# Patient Record
Sex: FEMALE | Race: WHITE | NOT HISPANIC OR LATINO | Employment: FULL TIME | URBAN - METROPOLITAN AREA
[De-identification: names, ages, dates, MRNs, and addresses within clinical notes are randomized per-mention and may not be internally consistent; named-entity substitution may affect disease eponyms.]

---

## 2021-08-12 ENCOUNTER — OFFICE VISIT (OUTPATIENT)
Dept: OBSTETRICS AND GYNECOLOGY | Age: 31
End: 2021-08-12

## 2021-08-12 VITALS
HEIGHT: 62 IN | BODY MASS INDEX: 19.69 KG/M2 | SYSTOLIC BLOOD PRESSURE: 100 MMHG | WEIGHT: 107 LBS | DIASTOLIC BLOOD PRESSURE: 58 MMHG

## 2021-08-12 DIAGNOSIS — Z30.011 VISIT FOR ORAL CONTRACEPTIVE PRESCRIPTION: ICD-10-CM

## 2021-08-12 DIAGNOSIS — F17.210 CIGARETTE SMOKER: ICD-10-CM

## 2021-08-12 DIAGNOSIS — Z30.46 NEXPLANON REMOVAL: Primary | ICD-10-CM

## 2021-08-12 LAB
B-HCG UR QL: NEGATIVE
INTERNAL NEGATIVE CONTROL: NORMAL
INTERNAL POSITIVE CONTROL: NORMAL
Lab: NORMAL

## 2021-08-12 PROCEDURE — 81025 URINE PREGNANCY TEST: CPT | Performed by: NURSE PRACTITIONER

## 2021-08-12 PROCEDURE — 99202 OFFICE O/P NEW SF 15 MIN: CPT | Performed by: NURSE PRACTITIONER

## 2021-08-12 PROCEDURE — 11982 REMOVE DRUG IMPLANT DEVICE: CPT | Performed by: NURSE PRACTITIONER

## 2021-08-12 RX ORDER — NORETHINDRONE ACETATE AND ETHINYL ESTRADIOL 1; .02 MG/1; MG/1
1 TABLET ORAL DAILY
Qty: 21 TABLET | Refills: 12 | Status: SHIPPED | OUTPATIENT
Start: 2021-08-12 | End: 2021-11-15

## 2021-08-12 NOTE — PROGRESS NOTES
"Subjective     Chief Complaint   Patient presents with   • Procedure     nexplanon removal       Patsy Motley is a 31 y.o. No obstetric history on file. whose LMP is No LMP recorded.     New GYN  Here for nexplanon removal and discuss starting OCP's  She is passed due on having this removed  She had placed in 2014  During the 3 years she had this she had irregular bleeding at first and senior care through started having regular periods  She is a smoker   Periods - regular, no problems  Pap - 7 years ago, normal and no hx of abnormal  She has 3 children, oldest is a boy and two daughters  She does not want to do her annual today - she will return for this    No Additional Complaints Reported    The following portions of the patient's history were reviewed and updated as appropriate:vital signs, allergies, current medications, past medical history, past social history, past surgical history and problem list      Review of Systems   A comprehensive review of systems was negative.     Objective      /58   Ht 157.5 cm (62\")   Wt 48.5 kg (107 lb)   BMI 19.57 kg/m²     Physical Exam    General:   alert and no distress   Heart: Not performed today   Lungs: Not performed today.   Breast: Not performed today   Neck: na   Abdomen: {Not performed today   CVA: Not performed today   Pelvis: Not performed today   Extremities: Not performed today   Neurologic: negative   Psychiatric: Normal affect, judgement, and mood       Lab Review   Labs: UPT - negative      Imaging   No data reviewed    Assessment/Plan     ASSESSMENT  1. Nexplanon removal    2. Visit for oral contraceptive prescription    3. Cigarette smoker        PLAN  1. No orders of the defined types were placed in this encounter.      2. Medications prescribed this encounter:        New Medications Ordered This Visit   Medications   • norethindrone-ethinyl estradiol (Loestrin 1/20, 21,) 1-20 MG-MCG per tablet     Sig: Take 1 tablet by mouth Daily.     Dispense:  " 21 tablet     Refill:  12       3. Nexplanon removed. Discussed R/B/A, use and side effects of OCP's. She is a smoker and discussed increased risks of blood clots, stroke, MI and that at 33 y/o would need to d/c combo OCP's. She v/u and would like to proceed with starting combo OCP's.     Follow up: 3 month(s) for annual exam and f/u on OCP's.    Marj Dinesh, APRN  2021            Nexplanon Removal Procedure Note    Pre-operative Diagnosis: Nexplanon complication - non    Post-operative Diagnosis: same    Indications:      Procedure Details   The risks (including infection, bruising, irregular bleeding, pain at removal site, and injury to muscles, nerves and blood vessels) and benefits of the procedure were explained to the patient and/or guardian and written informed consent was obtained.      Nexplanon device was easily located by palpation of inner arm.  The device insertion site was painted with betadine and allowed to dry.  Device site anesthetized with 3 ml 2% lidocaine with epi.  End of device was located and 11 blade was used to make small incision.  Device was located in subcutaneou tissue, grasped with hemostat and removed intact. Incision site was closed with steri-strips and band aid.   Pressure dressing applied.  There were no complications.      Pt tolerated procedure well      Condition:  Stable    Complications:  None    Plan:    The patient was advised to call for any rash, arm pain, fever, warmth or for prolonged bruising or bleeding. She was advised to use OTC acetaminophen as needed for mild to moderate pain.   Can remove pressure dressing in 24 hours. Band aid and steri strips in 2-3 days

## 2021-11-15 ENCOUNTER — OFFICE VISIT (OUTPATIENT)
Dept: OBSTETRICS AND GYNECOLOGY | Age: 31
End: 2021-11-15

## 2021-11-15 VITALS
DIASTOLIC BLOOD PRESSURE: 72 MMHG | HEIGHT: 62 IN | BODY MASS INDEX: 19.51 KG/M2 | SYSTOLIC BLOOD PRESSURE: 108 MMHG | WEIGHT: 106 LBS

## 2021-11-15 DIAGNOSIS — Z3A.01 LESS THAN 8 WEEKS GESTATION OF PREGNANCY: Primary | ICD-10-CM

## 2021-11-15 PROBLEM — F17.210 CIGARETTE SMOKER: Status: RESOLVED | Noted: 2021-08-12 | Resolved: 2021-11-15

## 2021-11-15 PROCEDURE — 99213 OFFICE O/P EST LOW 20 MIN: CPT | Performed by: NURSE PRACTITIONER

## 2021-11-15 RX ORDER — PNV NO.95/FERROUS FUM/FOLIC AC 28MG-0.8MG
1 TABLET ORAL DAILY
COMMUNITY

## 2021-11-16 NOTE — PROGRESS NOTES
I have reviewed the notes, assessments, and/or procedures performed by TYRONE THOMAS, I concur with her/his documentation of Patsy Motley.

## 2021-11-24 PROBLEM — F12.90 MARIJUANA USE: Status: ACTIVE | Noted: 2021-11-24

## 2021-11-24 LAB
ABO GROUP BLD: ABNORMAL
AMPHETAMINES UR QL SCN: NEGATIVE NG/ML
BACTERIA UR CULT: NO GROWTH
BACTERIA UR CULT: NORMAL
BARBITURATES UR QL SCN: NEGATIVE NG/ML
BASOPHILS # BLD AUTO: 0 X10E3/UL (ref 0–0.2)
BASOPHILS NFR BLD AUTO: 1 %
BENZODIAZ UR QL: NEGATIVE NG/ML
BLD GP AB SCN SERPL QL: NEGATIVE
BZE UR QL: NEGATIVE NG/ML
C TRACH RRNA SPEC QL NAA+PROBE: NEGATIVE
CANNABINOIDS UR CFM-MCNC: POSITIVE NG/ML
EOSINOPHIL # BLD AUTO: 0.1 X10E3/UL (ref 0–0.4)
EOSINOPHIL NFR BLD AUTO: 1 %
ERYTHROCYTE [DISTWIDTH] IN BLOOD BY AUTOMATED COUNT: 12.1 % (ref 11.7–15.4)
HBV SURFACE AG SERPL QL IA: NEGATIVE
HCT VFR BLD AUTO: 32.9 % (ref 34–46.6)
HCV AB S/CO SERPL IA: <0.1 S/CO RATIO (ref 0–0.9)
HGB A MFR BLD ELPH: 97 % (ref 96.4–98.8)
HGB A2 MFR BLD ELPH: 3 % (ref 1.8–3.2)
HGB BLD-MCNC: 11.1 G/DL (ref 11.1–15.9)
HGB F MFR BLD ELPH: 0 % (ref 0–2)
HGB FRACT BLD-IMP: NORMAL
HGB S MFR BLD ELPH: 0 %
HIV 1+2 AB+HIV1 P24 AG SERPL QL IA: NON REACTIVE
IMM GRANULOCYTES # BLD AUTO: 0 X10E3/UL (ref 0–0.1)
IMM GRANULOCYTES NFR BLD AUTO: 0 %
LYMPHOCYTES # BLD AUTO: 0.9 X10E3/UL (ref 0.7–3.1)
LYMPHOCYTES NFR BLD AUTO: 19 %
MCH RBC QN AUTO: 30.1 PG (ref 26.6–33)
MCHC RBC AUTO-ENTMCNC: 33.7 G/DL (ref 31.5–35.7)
MCV RBC AUTO: 89 FL (ref 79–97)
METHADONE UR QL SCN: NEGATIVE NG/ML
MONOCYTES # BLD AUTO: 0.4 X10E3/UL (ref 0.1–0.9)
MONOCYTES NFR BLD AUTO: 8 %
N GONORRHOEA RRNA SPEC QL NAA+PROBE: NEGATIVE
NEUTROPHILS # BLD AUTO: 3.5 X10E3/UL (ref 1.4–7)
NEUTROPHILS NFR BLD AUTO: 71 %
OPIATES UR QL: NEGATIVE NG/ML
PCP UR QL: NEGATIVE NG/ML
PLATELET # BLD AUTO: 262 X10E3/UL (ref 150–450)
PROPOXYPH UR QL SCN: NEGATIVE NG/ML
RBC # BLD AUTO: 3.69 X10E6/UL (ref 3.77–5.28)
RH BLD: POSITIVE
RPR SER QL: NON REACTIVE
RUBV IGG SERPL IA-ACNC: 8.65 INDEX
T VAGINALIS DNA SPEC QL NAA+PROBE: NEGATIVE
VZV IGG SER IA-ACNC: 971 INDEX
WBC # BLD AUTO: 4.9 X10E3/UL (ref 3.4–10.8)

## 2021-12-17 ENCOUNTER — INITIAL PRENATAL (OUTPATIENT)
Dept: OBSTETRICS AND GYNECOLOGY | Age: 31
End: 2021-12-17

## 2021-12-17 VITALS — SYSTOLIC BLOOD PRESSURE: 108 MMHG | DIASTOLIC BLOOD PRESSURE: 68 MMHG | WEIGHT: 115 LBS | BODY MASS INDEX: 21.03 KG/M2

## 2021-12-17 DIAGNOSIS — Z13.89 SCREENING FOR BLOOD OR PROTEIN IN URINE: ICD-10-CM

## 2021-12-17 DIAGNOSIS — O99.019 MATERNAL ANEMIA IN PREGNANCY, ANTEPARTUM: ICD-10-CM

## 2021-12-17 DIAGNOSIS — Z3A.12 12 WEEKS GESTATION OF PREGNANCY: Primary | ICD-10-CM

## 2021-12-17 LAB
EXTERNAL CYSTIC FIBROSIS: NEGATIVE
EXTERNAL NIPT: NORMAL
GLUCOSE UR STRIP-MCNC: NEGATIVE MG/DL
PROT UR STRIP-MCNC: NEGATIVE MG/DL

## 2021-12-17 PROCEDURE — 99213 OFFICE O/P EST LOW 20 MIN: CPT | Performed by: OBSTETRICS & GYNECOLOGY

## 2021-12-17 PROCEDURE — 90471 IMMUNIZATION ADMIN: CPT | Performed by: OBSTETRICS & GYNECOLOGY

## 2021-12-17 PROCEDURE — 90686 IIV4 VACC NO PRSV 0.5 ML IM: CPT | Performed by: OBSTETRICS & GYNECOLOGY

## 2021-12-17 NOTE — PROGRESS NOTES
CC: OB visit  HPI: pt presents for OB intake visit. She denies any issues today.   ROS:  Const: neg for fever  GI: neg for n/v  : neg for bleeding    O: see physical    A/p: 12 weeks: pt desires aneuploidy and carrier screening. Advised she call if she does not receive results within 2 weeks. Reviewed pnguidelines and call coverage. F/u 4 weeks. Recommend flu shot and pt desires today. Pt declines covid vaccine    Positive marijuana on tox screen: pt notes she stopped smoking since her last visit. Recommend she continue to avoid during pregnancy    Anemia: ordered anemia labs.    Pap done today, advised pt to call if she does not receive results    rtc 4 weeks, u/s in 8 weeks

## 2021-12-18 LAB
BASOPHILS # BLD AUTO: 0 X10E3/UL (ref 0–0.2)
BASOPHILS NFR BLD AUTO: 1 %
EOSINOPHIL # BLD AUTO: 0.1 X10E3/UL (ref 0–0.4)
EOSINOPHIL NFR BLD AUTO: 1 %
ERYTHROCYTE [DISTWIDTH] IN BLOOD BY AUTOMATED COUNT: 12.3 % (ref 11.7–15.4)
FERRITIN SERPL-MCNC: 42 NG/ML (ref 15–150)
FOLATE SERPL-MCNC: 15 NG/ML
HCT VFR BLD AUTO: 32.5 % (ref 34–46.6)
HGB BLD-MCNC: 11 G/DL (ref 11.1–15.9)
IMM GRANULOCYTES # BLD AUTO: 0 X10E3/UL (ref 0–0.1)
IMM GRANULOCYTES NFR BLD AUTO: 0 %
LYMPHOCYTES # BLD AUTO: 1 X10E3/UL (ref 0.7–3.1)
LYMPHOCYTES NFR BLD AUTO: 16 %
MCH RBC QN AUTO: 29.9 PG (ref 26.6–33)
MCHC RBC AUTO-ENTMCNC: 33.8 G/DL (ref 31.5–35.7)
MCV RBC AUTO: 88 FL (ref 79–97)
MONOCYTES # BLD AUTO: 0.4 X10E3/UL (ref 0.1–0.9)
MONOCYTES NFR BLD AUTO: 7 %
NEUTROPHILS # BLD AUTO: 4.5 X10E3/UL (ref 1.4–7)
NEUTROPHILS NFR BLD AUTO: 75 %
PLATELET # BLD AUTO: 247 X10E3/UL (ref 150–450)
RBC # BLD AUTO: 3.68 X10E6/UL (ref 3.77–5.28)
VIT B12 SERPL-MCNC: 449 PG/ML (ref 232–1245)
WBC # BLD AUTO: 6 X10E3/UL (ref 3.4–10.8)

## 2021-12-20 RX ORDER — FERROUS SULFATE 325(65) MG
325 TABLET ORAL
Qty: 30 TABLET | Refills: 11 | Status: SHIPPED | OUTPATIENT
Start: 2021-12-20 | End: 2022-04-05 | Stop reason: SDUPTHER

## 2021-12-22 LAB
CYTOLOGIST CVX/VAG CYTO: NORMAL
CYTOLOGY CVX/VAG DOC CYTO: NORMAL
CYTOLOGY CVX/VAG DOC THIN PREP: NORMAL
DX ICD CODE: NORMAL
HIV 1 & 2 AB SER-IMP: NORMAL
HPV I/H RISK 4 DNA CVX QL PROBE+SIG AMP: NEGATIVE
OTHER STN SPEC: NORMAL
STAT OF ADQ CVX/VAG CYTO-IMP: NORMAL

## 2022-01-25 ENCOUNTER — ROUTINE PRENATAL (OUTPATIENT)
Dept: OBSTETRICS AND GYNECOLOGY | Age: 32
End: 2022-01-25

## 2022-01-25 VITALS — WEIGHT: 121 LBS | BODY MASS INDEX: 22.13 KG/M2 | SYSTOLIC BLOOD PRESSURE: 118 MMHG | DIASTOLIC BLOOD PRESSURE: 58 MMHG

## 2022-01-25 DIAGNOSIS — Z3A.18 18 WEEKS GESTATION OF PREGNANCY: Primary | ICD-10-CM

## 2022-01-25 DIAGNOSIS — Z13.89 SCREENING FOR HEMATURIA OR PROTEINURIA: ICD-10-CM

## 2022-01-25 DIAGNOSIS — D50.9 IRON DEFICIENCY ANEMIA DURING PREGNANCY: ICD-10-CM

## 2022-01-25 DIAGNOSIS — Z36.1 ANTENATAL SCREENING FOR RAISED ALPHAFETOPROTEIN LEVEL: ICD-10-CM

## 2022-01-25 DIAGNOSIS — O99.019 IRON DEFICIENCY ANEMIA DURING PREGNANCY: ICD-10-CM

## 2022-01-25 DIAGNOSIS — Z86.16 HISTORY OF COVID-19: ICD-10-CM

## 2022-01-25 LAB
GLUCOSE UR STRIP-MCNC: NEGATIVE MG/DL
PROT UR STRIP-MCNC: NEGATIVE MG/DL

## 2022-01-25 PROCEDURE — 99213 OFFICE O/P EST LOW 20 MIN: CPT | Performed by: OBSTETRICS & GYNECOLOGY

## 2022-01-25 NOTE — PROGRESS NOTES
CC:OB visit  HPI: pt presents for routine visit. She notes she had covid infection and has recovered without problems. She denies pain or bleeding.   ROS:   : neg for bleeding/pain    O: fh= 18 cm, FHT's 139, ext: no edema or cords    A/p: 18 weeks: afp ordered today, advised to call if she does not receive results within 1 week; u/s at f/u visit 2 weeks    Recent covid infection: discussed option of baby asa as possible increased risk of preeclampsia, plan growth surveillance with u/s    Anemia: pt is taking iron supplement, check cbc/ferritin today

## 2022-01-27 LAB
AFP ADJ MOM SERPL: 1.66
AFP INTERP SERPL-IMP: NORMAL
AFP INTERP SERPL-IMP: NORMAL
AFP SERPL-MCNC: 87.8 NG/ML
AGE AT DELIVERY: 32.9 YR
BASOPHILS # BLD AUTO: 0 X10E3/UL (ref 0–0.2)
BASOPHILS NFR BLD AUTO: 0 %
EOSINOPHIL # BLD AUTO: 0.1 X10E3/UL (ref 0–0.4)
EOSINOPHIL NFR BLD AUTO: 1 %
ERYTHROCYTE [DISTWIDTH] IN BLOOD BY AUTOMATED COUNT: 11.9 % (ref 11.7–15.4)
FERRITIN SERPL-MCNC: 39 NG/ML (ref 15–150)
GA METHOD: NORMAL
GA: 18.3 WEEKS
HCT VFR BLD AUTO: 31.6 % (ref 34–46.6)
HGB BLD-MCNC: 11 G/DL (ref 11.1–15.9)
IDDM PATIENT QL: NO
IMM GRANULOCYTES # BLD AUTO: 0 X10E3/UL (ref 0–0.1)
IMM GRANULOCYTES NFR BLD AUTO: 1 %
LABORATORY COMMENT REPORT: NORMAL
LYMPHOCYTES # BLD AUTO: 0.9 X10E3/UL (ref 0.7–3.1)
LYMPHOCYTES NFR BLD AUTO: 14 %
MCH RBC QN AUTO: 30.6 PG (ref 26.6–33)
MCHC RBC AUTO-ENTMCNC: 34.8 G/DL (ref 31.5–35.7)
MCV RBC AUTO: 88 FL (ref 79–97)
MONOCYTES # BLD AUTO: 0.4 X10E3/UL (ref 0.1–0.9)
MONOCYTES NFR BLD AUTO: 7 %
MULTIPLE PREGNANCY: NO
NEURAL TUBE DEFECT RISK FETUS: 1802 %
NEUTROPHILS # BLD AUTO: 4.6 X10E3/UL (ref 1.4–7)
NEUTROPHILS NFR BLD AUTO: 77 %
PLATELET # BLD AUTO: 232 X10E3/UL (ref 150–450)
RBC # BLD AUTO: 3.59 X10E6/UL (ref 3.77–5.28)
RESULT: NORMAL
WBC # BLD AUTO: 6 X10E3/UL (ref 3.4–10.8)

## 2022-02-09 ENCOUNTER — ROUTINE PRENATAL (OUTPATIENT)
Dept: OBSTETRICS AND GYNECOLOGY | Age: 32
End: 2022-02-09

## 2022-02-09 VITALS — WEIGHT: 124.6 LBS | DIASTOLIC BLOOD PRESSURE: 64 MMHG | BODY MASS INDEX: 22.79 KG/M2 | SYSTOLIC BLOOD PRESSURE: 102 MMHG

## 2022-02-09 DIAGNOSIS — Z3A.20 20 WEEKS GESTATION OF PREGNANCY: Primary | ICD-10-CM

## 2022-02-09 LAB
GLUCOSE UR STRIP-MCNC: NEGATIVE MG/DL
PROT UR STRIP-MCNC: NEGATIVE MG/DL

## 2022-02-09 PROCEDURE — 99213 OFFICE O/P EST LOW 20 MIN: CPT | Performed by: OBSTETRICS & GYNECOLOGY

## 2022-02-09 NOTE — PROGRESS NOTES
CC: OB visit and u/s  HPI: pt presents for routine visit and denies any issues. Notes active fetal movement  ROS:  Const: neg   GI : pos for occ GERD  : neg for pain, bleeding, leaking fluid  O: fh= 21 cm, FHT's  Ext: no edema or cords  U/s: normal anatomic survey, cervical length over 4 cm    A/p: 20 weeks: normal anatomic survey, f/u 4 weeks with one hour glucose    GERD: discussed management     Hx covid during preg: pt has not started baby asa, recommended daily baby asa, plan growth surveillance in 8 weeks    Anemia: pt on iron supplement

## 2022-03-08 ENCOUNTER — ROUTINE PRENATAL (OUTPATIENT)
Dept: OBSTETRICS AND GYNECOLOGY | Age: 32
End: 2022-03-08

## 2022-03-08 VITALS — BODY MASS INDEX: 23.59 KG/M2 | SYSTOLIC BLOOD PRESSURE: 110 MMHG | DIASTOLIC BLOOD PRESSURE: 60 MMHG | WEIGHT: 129 LBS

## 2022-03-08 DIAGNOSIS — Z3A.24 24 WEEKS GESTATION OF PREGNANCY: ICD-10-CM

## 2022-03-08 DIAGNOSIS — O99.019 MATERNAL ANEMIA IN PREGNANCY, ANTEPARTUM: ICD-10-CM

## 2022-03-08 DIAGNOSIS — Z13.1 SCREENING FOR DIABETES MELLITUS: Primary | ICD-10-CM

## 2022-03-08 LAB
GLUCOSE UR STRIP-MCNC: NEGATIVE MG/DL
PROT UR STRIP-MCNC: NEGATIVE MG/DL

## 2022-03-08 PROCEDURE — 99213 OFFICE O/P EST LOW 20 MIN: CPT | Performed by: OBSTETRICS & GYNECOLOGY

## 2022-03-08 RX ORDER — ASPIRIN 81 MG/1
81 TABLET, CHEWABLE ORAL DAILY
COMMUNITY
End: 2022-06-26 | Stop reason: HOSPADM

## 2022-03-08 NOTE — PROGRESS NOTES
CC: OB visit  HPI: pt presents for routine visit. She notes active fetal movement and denies ctx, vag bleeding or leaking fluid. She started baby asa and is taking iron supplement    O: fh= 25 cm  Ext: no edema or cords  FHT;s 140 bpm    A/p: 25 weeks: one hour today, reviewed PTL warnings. F/u 4 weeks    Hx covid during preg: pt started baby asa, plan growth check next visit    Anemia: check ferritin, B12 and folic acid with cbc today

## 2022-03-09 ENCOUNTER — TELEPHONE (OUTPATIENT)
Dept: OBSTETRICS AND GYNECOLOGY | Age: 32
End: 2022-03-09

## 2022-03-09 DIAGNOSIS — R73.09 ELEVATED GLUCOSE TOLERANCE TEST: Primary | ICD-10-CM

## 2022-03-09 LAB
ERYTHROCYTE [DISTWIDTH] IN BLOOD BY AUTOMATED COUNT: 11.9 % (ref 11.7–15.4)
FERRITIN SERPL-MCNC: 31 NG/ML (ref 15–150)
FOLATE SERPL-MCNC: >20 NG/ML
GLUCOSE 1H P 50 G GLC PO SERPL-MCNC: 141 MG/DL (ref 65–139)
HCT VFR BLD AUTO: 29.8 % (ref 34–46.6)
HGB BLD-MCNC: 10.5 G/DL (ref 11.1–15.9)
MCH RBC QN AUTO: 31.1 PG (ref 26.6–33)
MCHC RBC AUTO-ENTMCNC: 35.2 G/DL (ref 31.5–35.7)
MCV RBC AUTO: 88 FL (ref 79–97)
PLATELET # BLD AUTO: 190 X10E3/UL (ref 150–450)
RBC # BLD AUTO: 3.38 X10E6/UL (ref 3.77–5.28)
VIT B12 SERPL-MCNC: 385 PG/ML (ref 232–1245)
WBC # BLD AUTO: 5.2 X10E3/UL (ref 3.4–10.8)

## 2022-03-09 NOTE — TELEPHONE ENCOUNTER
Called Patsy and advised she has an elevated one hour glucose value and needs a 3 hour. This has been ordered and she will call tomorrow to book. Discussed ongoing anemia. She notes she has not been taking iron twice daily. Discussed option of infusion and she would like to try to take twice daily for now. Also recommended she start B 12 supplement as low range of normal.

## 2022-03-15 ENCOUNTER — LAB (OUTPATIENT)
Dept: OBSTETRICS AND GYNECOLOGY | Age: 32
End: 2022-03-15

## 2022-03-16 LAB
GLUCOSE 1H P 100 G GLC PO SERPL-MCNC: 177 MG/DL (ref 65–179)
GLUCOSE 2H P 100 G GLC PO SERPL-MCNC: 164 MG/DL (ref 65–154)
GLUCOSE 3H P 100 G GLC PO SERPL-MCNC: 118 MG/DL (ref 65–139)
GLUCOSE P FAST SERPL-MCNC: 74 MG/DL (ref 65–94)
Lab: ABNORMAL

## 2022-04-05 ENCOUNTER — ROUTINE PRENATAL (OUTPATIENT)
Dept: OBSTETRICS AND GYNECOLOGY | Age: 32
End: 2022-04-05

## 2022-04-05 VITALS — DIASTOLIC BLOOD PRESSURE: 62 MMHG | SYSTOLIC BLOOD PRESSURE: 112 MMHG | WEIGHT: 133 LBS | BODY MASS INDEX: 24.33 KG/M2

## 2022-04-05 DIAGNOSIS — F12.90 MARIJUANA USE: ICD-10-CM

## 2022-04-05 DIAGNOSIS — O99.019 MATERNAL ANEMIA IN PREGNANCY, ANTEPARTUM: ICD-10-CM

## 2022-04-05 DIAGNOSIS — Z3A.28 28 WEEKS GESTATION OF PREGNANCY: Primary | ICD-10-CM

## 2022-04-05 LAB
GLUCOSE UR STRIP-MCNC: NEGATIVE MG/DL
PROT UR STRIP-MCNC: NEGATIVE MG/DL

## 2022-04-05 PROCEDURE — 90715 TDAP VACCINE 7 YRS/> IM: CPT | Performed by: OBSTETRICS & GYNECOLOGY

## 2022-04-05 PROCEDURE — 76816 OB US FOLLOW-UP PER FETUS: CPT | Performed by: OBSTETRICS & GYNECOLOGY

## 2022-04-05 PROCEDURE — 90471 IMMUNIZATION ADMIN: CPT | Performed by: OBSTETRICS & GYNECOLOGY

## 2022-04-05 PROCEDURE — 99213 OFFICE O/P EST LOW 20 MIN: CPT | Performed by: OBSTETRICS & GYNECOLOGY

## 2022-04-05 RX ORDER — FERROUS SULFATE 325(65) MG
325 TABLET ORAL 2 TIMES DAILY
Qty: 60 TABLET | Refills: 6 | Status: SHIPPED | OUTPATIENT
Start: 2022-04-05

## 2022-04-05 NOTE — PROGRESS NOTES
CC: ob visit  HPI: pt presents for routine visit. She denies any issues today. She notes active fetal movement and denies reg ctx, vag bleeding/leaking fluid    ROS:  Const: neg   : neg for regular ctx, vag bleeding/leaking fluid  O: fh= 29 cm, FHT;s 151 bpm, ext: no cords or edema    U/s: growth at 57 %, AC 66 %, ana laura 20, vtx    A/p: hx covid during preg: pt taking baby asa, growth appropriate, repeat 4 weeks    Elevated one hour: pt passed 3 hour, recommend she limit concentrated carbohydrates/sugars    Anemia: pt taking iron twice daily, repeat cbc/ferritin today    28 weeks: recommend daily fmc's and tdap today. Pt agrees. F/u 2 weeks.

## 2022-04-06 LAB
BASOPHILS # BLD AUTO: 0 X10E3/UL (ref 0–0.2)
BASOPHILS NFR BLD AUTO: 0 %
EOSINOPHIL # BLD AUTO: 0.1 X10E3/UL (ref 0–0.4)
EOSINOPHIL NFR BLD AUTO: 1 %
ERYTHROCYTE [DISTWIDTH] IN BLOOD BY AUTOMATED COUNT: 12.2 % (ref 11.7–15.4)
FERRITIN SERPL-MCNC: 31 NG/ML (ref 15–150)
HCT VFR BLD AUTO: 33.2 % (ref 34–46.6)
HGB BLD-MCNC: 11.4 G/DL (ref 11.1–15.9)
IMM GRANULOCYTES # BLD AUTO: 0 X10E3/UL (ref 0–0.1)
IMM GRANULOCYTES NFR BLD AUTO: 1 %
LYMPHOCYTES # BLD AUTO: 0.9 X10E3/UL (ref 0.7–3.1)
LYMPHOCYTES NFR BLD AUTO: 16 %
MCH RBC QN AUTO: 30.6 PG (ref 26.6–33)
MCHC RBC AUTO-ENTMCNC: 34.3 G/DL (ref 31.5–35.7)
MCV RBC AUTO: 89 FL (ref 79–97)
MONOCYTES # BLD AUTO: 0.5 X10E3/UL (ref 0.1–0.9)
MONOCYTES NFR BLD AUTO: 9 %
NEUTROPHILS # BLD AUTO: 4.2 X10E3/UL (ref 1.4–7)
NEUTROPHILS NFR BLD AUTO: 73 %
PLATELET # BLD AUTO: 198 X10E3/UL (ref 150–450)
RBC # BLD AUTO: 3.73 X10E6/UL (ref 3.77–5.28)
WBC # BLD AUTO: 5.7 X10E3/UL (ref 3.4–10.8)

## 2022-04-07 NOTE — PROGRESS NOTES
Pt notified of results, Pt noted she would like to know if taking a Grassfed Beef Liver supplement would help improve her Iron? She heard from a friend that this may help.

## 2022-04-19 ENCOUNTER — ROUTINE PRENATAL (OUTPATIENT)
Dept: OBSTETRICS AND GYNECOLOGY | Age: 32
End: 2022-04-19

## 2022-04-19 VITALS — BODY MASS INDEX: 24.91 KG/M2 | DIASTOLIC BLOOD PRESSURE: 52 MMHG | SYSTOLIC BLOOD PRESSURE: 112 MMHG | WEIGHT: 136.2 LBS

## 2022-04-19 DIAGNOSIS — Z3A.30 30 WEEKS GESTATION OF PREGNANCY: Primary | ICD-10-CM

## 2022-04-19 LAB
GLUCOSE UR STRIP-MCNC: NEGATIVE MG/DL
PROT UR STRIP-MCNC: NEGATIVE MG/DL

## 2022-04-19 PROCEDURE — 99213 OFFICE O/P EST LOW 20 MIN: CPT | Performed by: OBSTETRICS & GYNECOLOGY

## 2022-04-19 NOTE — PROGRESS NOTES
CC: OB visit  HPI: pt presents for routine visit. She denies any issues today. She notes active fetal movement and denies ctx, bleeding, leaking fuid  Ros:  Const: neg for fever  : neg for ctx, vag bleeding/leaking fluid  O: fh= 30 cm, FHT's 132 bpm, ext: no edema or cords    A/p: 30 weeks: continue daily fmc's and reviewed PTL warnings    Anemia: improved last check. She will continue iron supplement    covid during preg: she is taking baby asa, plan growth u/s 2 weeks at next appt

## 2022-05-04 ENCOUNTER — ROUTINE PRENATAL (OUTPATIENT)
Dept: OBSTETRICS AND GYNECOLOGY | Age: 32
End: 2022-05-04

## 2022-05-04 VITALS — SYSTOLIC BLOOD PRESSURE: 112 MMHG | BODY MASS INDEX: 25.13 KG/M2 | DIASTOLIC BLOOD PRESSURE: 62 MMHG | WEIGHT: 137.4 LBS

## 2022-05-04 DIAGNOSIS — O99.019 MATERNAL ANEMIA IN PREGNANCY, ANTEPARTUM: ICD-10-CM

## 2022-05-04 DIAGNOSIS — R73.09 ELEVATED GLUCOSE TOLERANCE TEST: ICD-10-CM

## 2022-05-04 DIAGNOSIS — Z3A.32 32 WEEKS GESTATION OF PREGNANCY: Primary | ICD-10-CM

## 2022-05-04 LAB
GLUCOSE UR STRIP-MCNC: NEGATIVE MG/DL
PROT UR STRIP-MCNC: NEGATIVE MG/DL

## 2022-05-04 PROCEDURE — 99213 OFFICE O/P EST LOW 20 MIN: CPT | Performed by: OBSTETRICS & GYNECOLOGY

## 2022-05-04 RX ORDER — BLOOD-GLUCOSE METER
1 KIT MISCELLANEOUS AS NEEDED
Qty: 1 EACH | Refills: 0 | Status: SHIPPED | OUTPATIENT
Start: 2022-05-04

## 2022-05-04 NOTE — PROGRESS NOTES
CC: OB visti  HPI: pt presents for routine visit. No issues today. She notes active fetal movement. She denies reg ctx, vag bleeding, leaking fluid  Ros:  : neg for reg ctx, bleeding/leaking fluid  O: fh= 33 cm, FHT's 149 bpm, ext : no edema or cords    U/s: growth 90 %, ana laura 12.6    A/p: LGA: pt had elevated one hour but passed 3 hour with 1 abnormal value. Recommend she start glucose monitoring and supplies sent with instructions. Also referred for diabetic and diet counseling and pt agrees. Continue daily fmc's.     covid during preg: on baby asa, continue growth surveillance    Anemia: on iron supplement, f/u labs today

## 2022-05-05 LAB
BASOPHILS # BLD AUTO: 0 X10E3/UL (ref 0–0.2)
BASOPHILS NFR BLD AUTO: 0 %
EOSINOPHIL # BLD AUTO: 0.1 X10E3/UL (ref 0–0.4)
EOSINOPHIL NFR BLD AUTO: 1 %
ERYTHROCYTE [DISTWIDTH] IN BLOOD BY AUTOMATED COUNT: 12.5 % (ref 11.7–15.4)
FERRITIN SERPL-MCNC: 40 NG/ML (ref 15–150)
HCT VFR BLD AUTO: 33.2 % (ref 34–46.6)
HGB BLD-MCNC: 11.2 G/DL (ref 11.1–15.9)
IMM GRANULOCYTES # BLD AUTO: 0.1 X10E3/UL (ref 0–0.1)
IMM GRANULOCYTES NFR BLD AUTO: 1 %
LYMPHOCYTES # BLD AUTO: 0.9 X10E3/UL (ref 0.7–3.1)
LYMPHOCYTES NFR BLD AUTO: 15 %
MCH RBC QN AUTO: 30.1 PG (ref 26.6–33)
MCHC RBC AUTO-ENTMCNC: 33.7 G/DL (ref 31.5–35.7)
MCV RBC AUTO: 89 FL (ref 79–97)
MONOCYTES # BLD AUTO: 0.5 X10E3/UL (ref 0.1–0.9)
MONOCYTES NFR BLD AUTO: 9 %
NEUTROPHILS # BLD AUTO: 4.3 X10E3/UL (ref 1.4–7)
NEUTROPHILS NFR BLD AUTO: 74 %
PLATELET # BLD AUTO: 188 X10E3/UL (ref 150–450)
RBC # BLD AUTO: 3.72 X10E6/UL (ref 3.77–5.28)
WBC # BLD AUTO: 5.8 X10E3/UL (ref 3.4–10.8)

## 2022-05-06 ENCOUNTER — HOSPITAL ENCOUNTER (OUTPATIENT)
Dept: DIABETES SERVICES | Facility: HOSPITAL | Age: 32
Discharge: HOME | End: 2022-05-06
Admitting: OBSTETRICS & GYNECOLOGY

## 2022-05-06 PROCEDURE — G0108 DIAB MANAGE TRN  PER INDIV: HCPCS

## 2022-05-09 ENCOUNTER — ROUTINE PRENATAL (OUTPATIENT)
Dept: OBSTETRICS AND GYNECOLOGY | Age: 32
End: 2022-05-09

## 2022-05-09 VITALS — DIASTOLIC BLOOD PRESSURE: 54 MMHG | BODY MASS INDEX: 25.2 KG/M2 | WEIGHT: 137.8 LBS | SYSTOLIC BLOOD PRESSURE: 110 MMHG

## 2022-05-09 DIAGNOSIS — R73.09 ELEVATED GLUCOSE TOLERANCE TEST: ICD-10-CM

## 2022-05-09 DIAGNOSIS — Z3A.33 33 WEEKS GESTATION OF PREGNANCY: Primary | ICD-10-CM

## 2022-05-09 LAB
GLUCOSE UR STRIP-MCNC: NEGATIVE MG/DL
PROT UR STRIP-MCNC: NEGATIVE MG/DL

## 2022-05-09 PROCEDURE — 99213 OFFICE O/P EST LOW 20 MIN: CPT | Performed by: OBSTETRICS & GYNECOLOGY

## 2022-05-09 RX ORDER — BLOOD-GLUCOSE METER
KIT MISCELLANEOUS 4 TIMES DAILY
COMMUNITY
Start: 2022-05-04

## 2022-05-09 NOTE — PROGRESS NOTES
CC: ob visit  HPI: pt presents for routine visit. She started glucose monitoring and had some elevated values after dinner that is improving with diet modification.   O: glucose log: all fasting normal and under 85. ( 1 elevated reported but was not fasting ); pp values normal except after dinner. Initially elevated but improved with last 3 values    A/p: glucose resistance/LGA: continue glucose monitoring and attention to diet. Pt verbally consulted with dietician and notes this helped. She will continue testing. Plan repeat growth in 3 weeks then weekly BPP    Anemia: stable with iron supplementation    Hx covid: pt on baby asa

## 2022-05-17 ENCOUNTER — ROUTINE PRENATAL (OUTPATIENT)
Dept: OBSTETRICS AND GYNECOLOGY | Age: 32
End: 2022-05-17

## 2022-05-17 ENCOUNTER — HOSPITAL ENCOUNTER (OUTPATIENT)
Dept: CARDIOLOGY | Facility: HOSPITAL | Age: 32
Discharge: HOME | End: 2022-05-17
Admitting: OBSTETRICS & GYNECOLOGY

## 2022-05-17 VITALS — BODY MASS INDEX: 24.98 KG/M2 | DIASTOLIC BLOOD PRESSURE: 58 MMHG | SYSTOLIC BLOOD PRESSURE: 110 MMHG | WEIGHT: 136.6 LBS

## 2022-05-17 DIAGNOSIS — M79.662 PAIN OF LEFT CALF: ICD-10-CM

## 2022-05-17 DIAGNOSIS — Z3A.34 34 WEEKS GESTATION OF PREGNANCY: Primary | ICD-10-CM

## 2022-05-17 LAB
BH CV LOWER VASCULAR LEFT COMMON FEMORAL AUGMENT: NORMAL
BH CV LOWER VASCULAR LEFT COMMON FEMORAL COMPETENT: NORMAL
BH CV LOWER VASCULAR LEFT COMMON FEMORAL COMPRESS: NORMAL
BH CV LOWER VASCULAR LEFT COMMON FEMORAL PHASIC: NORMAL
BH CV LOWER VASCULAR LEFT COMMON FEMORAL SPONT: NORMAL
BH CV LOWER VASCULAR LEFT DISTAL FEMORAL COMPRESS: NORMAL
BH CV LOWER VASCULAR LEFT GASTRONEMIUS COMPRESS: NORMAL
BH CV LOWER VASCULAR LEFT GREATER SAPH AK COMPRESS: NORMAL
BH CV LOWER VASCULAR LEFT GREATER SAPH BK COMPRESS: NORMAL
BH CV LOWER VASCULAR LEFT LESSER SAPH COMPRESS: NORMAL
BH CV LOWER VASCULAR LEFT MID FEMORAL AUGMENT: NORMAL
BH CV LOWER VASCULAR LEFT MID FEMORAL COMPETENT: NORMAL
BH CV LOWER VASCULAR LEFT MID FEMORAL COMPRESS: NORMAL
BH CV LOWER VASCULAR LEFT MID FEMORAL PHASIC: NORMAL
BH CV LOWER VASCULAR LEFT MID FEMORAL SPONT: NORMAL
BH CV LOWER VASCULAR LEFT PERONEAL COMPRESS: NORMAL
BH CV LOWER VASCULAR LEFT POPLITEAL AUGMENT: NORMAL
BH CV LOWER VASCULAR LEFT POPLITEAL COMPETENT: NORMAL
BH CV LOWER VASCULAR LEFT POPLITEAL COMPRESS: NORMAL
BH CV LOWER VASCULAR LEFT POPLITEAL PHASIC: NORMAL
BH CV LOWER VASCULAR LEFT POPLITEAL SPONT: NORMAL
BH CV LOWER VASCULAR LEFT POSTERIOR TIBIAL COMPRESS: NORMAL
BH CV LOWER VASCULAR LEFT PROFUNDA FEMORAL COMPRESS: NORMAL
BH CV LOWER VASCULAR LEFT PROXIMAL FEMORAL COMPRESS: NORMAL
BH CV LOWER VASCULAR LEFT SAPHENOFEMORAL JUNCTION COMPRESS: NORMAL
BH CV LOWER VASCULAR RIGHT COMMON FEMORAL AUGMENT: NORMAL
BH CV LOWER VASCULAR RIGHT COMMON FEMORAL COMPETENT: NORMAL
BH CV LOWER VASCULAR RIGHT COMMON FEMORAL COMPRESS: NORMAL
BH CV LOWER VASCULAR RIGHT COMMON FEMORAL PHASIC: NORMAL
BH CV LOWER VASCULAR RIGHT COMMON FEMORAL SPONT: NORMAL
BH CV LOWER VASCULAR RIGHT DISTAL FEMORAL COMPRESS: NORMAL
BH CV LOWER VASCULAR RIGHT GASTRONEMIUS COMPRESS: NORMAL
BH CV LOWER VASCULAR RIGHT GREATER SAPH AK COMPRESS: NORMAL
BH CV LOWER VASCULAR RIGHT GREATER SAPH BK COMPRESS: NORMAL
BH CV LOWER VASCULAR RIGHT LESSER SAPH COMPRESS: NORMAL
BH CV LOWER VASCULAR RIGHT MID FEMORAL AUGMENT: NORMAL
BH CV LOWER VASCULAR RIGHT MID FEMORAL COMPETENT: NORMAL
BH CV LOWER VASCULAR RIGHT MID FEMORAL COMPRESS: NORMAL
BH CV LOWER VASCULAR RIGHT MID FEMORAL PHASIC: NORMAL
BH CV LOWER VASCULAR RIGHT MID FEMORAL SPONT: NORMAL
BH CV LOWER VASCULAR RIGHT PERONEAL COMPRESS: NORMAL
BH CV LOWER VASCULAR RIGHT POPLITEAL AUGMENT: NORMAL
BH CV LOWER VASCULAR RIGHT POPLITEAL COMPETENT: NORMAL
BH CV LOWER VASCULAR RIGHT POPLITEAL COMPRESS: NORMAL
BH CV LOWER VASCULAR RIGHT POPLITEAL PHASIC: NORMAL
BH CV LOWER VASCULAR RIGHT POPLITEAL SPONT: NORMAL
BH CV LOWER VASCULAR RIGHT POSTERIOR TIBIAL COMPRESS: NORMAL
BH CV LOWER VASCULAR RIGHT PROFUNDA FEMORAL COMPRESS: NORMAL
BH CV LOWER VASCULAR RIGHT PROXIMAL FEMORAL COMPRESS: NORMAL
BH CV LOWER VASCULAR RIGHT SAPHENOFEMORAL JUNCTION COMPRESS: NORMAL
GLUCOSE UR STRIP-MCNC: NEGATIVE MG/DL
MAXIMAL PREDICTED HEART RATE: 188 BPM
PROT UR STRIP-MCNC: NEGATIVE MG/DL
STRESS TARGET HR: 160 BPM

## 2022-05-17 PROCEDURE — 99213 OFFICE O/P EST LOW 20 MIN: CPT | Performed by: OBSTETRICS & GYNECOLOGY

## 2022-05-17 PROCEDURE — 93970 EXTREMITY STUDY: CPT

## 2022-05-17 NOTE — PROGRESS NOTES
CC: ob visit  HPI: pt presents for routine visit. She denies any issues today. She does complain of left calf pain last week. She had a yari horse but then residual pain in her left calf for several days. Pain is now gone.     ROS:   Gu: neg for bleeding/leaking fluid    O: Glucose log: fasting all less than 91; pp values overall normal, 4 values in last week mildly increased in 130's    Fh=35 cm, FHTs 152 bpm, ext: bilateral lower ext without cords, edema or tenderness    A/p: LGA: glucose log reviewed and overall normal. Continue daily fmc's and repeat u/s scheduled in 2 weeks. Pt will continue glucose monitoring    Calf pain : now resolved but present for a few days. Recommend doppler and pt agrees. Ordered for today. If neg, plan observation, pt advised to call if symptoms recur

## 2022-05-17 NOTE — PROGRESS NOTES
Bilateral leg venous Doppler study preliminary report: negative for dvt in both legs.  Called report to Dr. Logan and no further instructions were given.

## 2022-05-31 ENCOUNTER — ROUTINE PRENATAL (OUTPATIENT)
Dept: OBSTETRICS AND GYNECOLOGY | Age: 32
End: 2022-05-31

## 2022-05-31 VITALS — BODY MASS INDEX: 24.84 KG/M2 | SYSTOLIC BLOOD PRESSURE: 110 MMHG | DIASTOLIC BLOOD PRESSURE: 62 MMHG | WEIGHT: 135.8 LBS

## 2022-05-31 DIAGNOSIS — Z36.85 ANTENATAL SCREENING FOR STREPTOCOCCUS B: ICD-10-CM

## 2022-05-31 DIAGNOSIS — Z3A.36 36 WEEKS GESTATION OF PREGNANCY: Primary | ICD-10-CM

## 2022-05-31 LAB
GLUCOSE UR STRIP-MCNC: NEGATIVE MG/DL
PROT UR STRIP-MCNC: NEGATIVE MG/DL

## 2022-05-31 PROCEDURE — 99213 OFFICE O/P EST LOW 20 MIN: CPT | Performed by: OBSTETRICS & GYNECOLOGY

## 2022-05-31 NOTE — PROGRESS NOTES
CC: OB visit and u/s  HPI: pt presents for routine visit and u/s. She notes active fetal movement and denies any issues.   Glucose log reviewed: fasting values all < or = 95, pp values with sporadic elevations but most values are normal    Ros:  : neg for bleeding/leaking fluid or reg ctx  U/s: growth at 74 %, ac at 55%, efw = 6-14, ana laura normal at 16    A/p: glucose intolerance/hx LGA: u/s with appropriate growth today. Glucose log reviewed. Occasional elevations noted without trend. Most values are normal. Continue glucose monitoring. Continue daily fmc's.     Anemia: pt is on iron supplement.     36 weeks: GBS done, reviewed labor warnings.

## 2022-06-02 LAB — GP B STREP DNA SPEC QL NAA+PROBE: NEGATIVE

## 2022-06-07 ENCOUNTER — ROUTINE PRENATAL (OUTPATIENT)
Dept: OBSTETRICS AND GYNECOLOGY | Age: 32
End: 2022-06-07

## 2022-06-07 VITALS — DIASTOLIC BLOOD PRESSURE: 58 MMHG | BODY MASS INDEX: 25.42 KG/M2 | WEIGHT: 139 LBS | SYSTOLIC BLOOD PRESSURE: 110 MMHG

## 2022-06-07 DIAGNOSIS — Z86.16 HISTORY OF COVID-19: ICD-10-CM

## 2022-06-07 DIAGNOSIS — Z3A.37 37 WEEKS GESTATION OF PREGNANCY: Primary | ICD-10-CM

## 2022-06-07 LAB
GLUCOSE UR STRIP-MCNC: NEGATIVE MG/DL
PROT UR STRIP-MCNC: NEGATIVE MG/DL

## 2022-06-07 PROCEDURE — 76819 FETAL BIOPHYS PROFIL W/O NST: CPT | Performed by: OBSTETRICS & GYNECOLOGY

## 2022-06-07 PROCEDURE — 99213 OFFICE O/P EST LOW 20 MIN: CPT | Performed by: OBSTETRICS & GYNECOLOGY

## 2022-06-07 NOTE — PROGRESS NOTES
CC: ob visit  HPI: pt presents for routine visit. No issues today. Notes active fetal movement.    Ros:  Cv: pos for mild swelling  Pulm: neg for soa  Neuro: neg for headaches, vision changes  : neg for reg ctx, vag bleeding/leaking fluid  O: glucose log: fasting all less than 95, pp values 106 to 132  Cervix: cl/50/-3, ext: trace edema, no cords    U/s: BPP 8/8, ana laura 16    A/p: 37 weeks: reviewed labor warnings, continue daily fmc's    Hx covid: mild swelling noted, continue weekly BPP, reviewed preeclamptic warnings, recommend she stop baby asa    Glucose intolerance: last efw 74 %, glucose values stable with monitoring. Reviewed risks of shoulder dystocia increased with glucose intolerance but not contraindication to vag delivery with recent efw. Discussed that shoulder dystocia can result in permanent injury. Discussed c/s is option to prevent. Pt considered and prefers vaginal delivery. She had uncomplicated deliveries in past.

## 2022-06-14 ENCOUNTER — ROUTINE PRENATAL (OUTPATIENT)
Dept: OBSTETRICS AND GYNECOLOGY | Age: 32
End: 2022-06-14

## 2022-06-14 VITALS — BODY MASS INDEX: 25.28 KG/M2 | DIASTOLIC BLOOD PRESSURE: 54 MMHG | SYSTOLIC BLOOD PRESSURE: 110 MMHG | WEIGHT: 138.2 LBS

## 2022-06-14 DIAGNOSIS — Z34.90 TERM PREGNANCY: ICD-10-CM

## 2022-06-14 DIAGNOSIS — Z3A.38 38 WEEKS GESTATION OF PREGNANCY: Primary | ICD-10-CM

## 2022-06-14 LAB
GLUCOSE UR STRIP-MCNC: NEGATIVE MG/DL
PROT UR STRIP-MCNC: NEGATIVE MG/DL

## 2022-06-14 PROCEDURE — 99213 OFFICE O/P EST LOW 20 MIN: CPT | Performed by: OBSTETRICS & GYNECOLOGY

## 2022-06-14 NOTE — PROGRESS NOTES
CC: OB visit  HPI: pt presents for routine visit. She denies leaking fluid or vag bleeeding. She notes active fetal movement.   Glucose log reviewed: all fasting values are under 94; pp values have occ elevations but most normal    ROS:  : neg for leaking fluid or vag bleeding    U/S: BPP 8/8, ana laura 7.98 to 8.48    A/p: hx covid: growth normal at 36 weeks, reassuring BPP, ana laura is lower range of normal. Plan repeat BPP in 2 days and pt agrees.     Glucose intolerance: growth was appropriate last check at 36 weeks. Glucose monitoring with stable values overall.     38 weeks: reviewed options of IOL at 39 weeks. Risks and benefits reviewed and pt desires. Will schedule next Monday and pt agrees.  Reviewed labor warnings. Continue daily fmc's and labor warnings.

## 2022-06-16 ENCOUNTER — ROUTINE PRENATAL (OUTPATIENT)
Dept: OBSTETRICS AND GYNECOLOGY | Age: 32
End: 2022-06-16

## 2022-06-16 VITALS — WEIGHT: 137.4 LBS | DIASTOLIC BLOOD PRESSURE: 62 MMHG | BODY MASS INDEX: 25.13 KG/M2 | SYSTOLIC BLOOD PRESSURE: 120 MMHG

## 2022-06-16 DIAGNOSIS — Z3A.38 38 WEEKS GESTATION OF PREGNANCY: Primary | ICD-10-CM

## 2022-06-16 LAB
GLUCOSE UR STRIP-MCNC: NEGATIVE MG/DL
PROT UR STRIP-MCNC: NEGATIVE MG/DL

## 2022-06-16 PROCEDURE — 99213 OFFICE O/P EST LOW 20 MIN: CPT | Performed by: OBSTETRICS & GYNECOLOGY

## 2022-06-16 NOTE — PROGRESS NOTES
CC: f/u u/s, borderline low tanner  HPI:pt presents for f/u u/s and denies any issues. She had some mucous yesterday but denies leaking fluid. She notes active fetal movement.     ROS:  : pos for mucous plug, neg for bleeding/leaking fluid.    O: u/s: BPP= 8/8, tanner = 11    A/p: appropriate TANNER noted today, plan observe for labor or IOL next week as booked. Pt understands risks and benefits and agrees with plan. Instructions reviewed. Continue daily fmc's.       A/p: hx covid: normal TANNER today with reassuring BPP, plan delivery next week at 39 weeks. L&D not booking additional inductions on 6/20 so pt booked 6/21.

## 2022-06-20 ENCOUNTER — PREP FOR SURGERY (OUTPATIENT)
Dept: OTHER | Facility: HOSPITAL | Age: 32
End: 2022-06-20

## 2022-06-20 DIAGNOSIS — Z3A.39 39 WEEKS GESTATION OF PREGNANCY: Primary | ICD-10-CM

## 2022-06-20 RX ORDER — OXYTOCIN-SODIUM CHLORIDE 0.9% IV SOLN 30 UNIT/500ML 30-0.9/5 UT/ML-%
250 SOLUTION INTRAVENOUS CONTINUOUS PRN
Status: CANCELLED | OUTPATIENT
Start: 2022-06-20 | End: 2022-06-20

## 2022-06-20 RX ORDER — MISOPROSTOL 200 UG/1
800 TABLET ORAL ONCE AS NEEDED
Status: CANCELLED | OUTPATIENT
Start: 2022-06-20

## 2022-06-20 RX ORDER — MAGNESIUM CARB/ALUMINUM HYDROX 105-160MG
30 TABLET,CHEWABLE ORAL ONCE
Status: CANCELLED | OUTPATIENT
Start: 2022-06-20 | End: 2022-06-20

## 2022-06-20 RX ORDER — CARBOPROST TROMETHAMINE 250 UG/ML
250 INJECTION, SOLUTION INTRAMUSCULAR
Status: CANCELLED | OUTPATIENT
Start: 2022-06-20

## 2022-06-20 RX ORDER — OXYTOCIN-SODIUM CHLORIDE 0.9% IV SOLN 30 UNIT/500ML 30-0.9/5 UT/ML-%
2-20 SOLUTION INTRAVENOUS
Status: CANCELLED | OUTPATIENT
Start: 2022-06-20

## 2022-06-20 RX ORDER — METHYLERGONOVINE MALEATE 0.2 MG/ML
200 INJECTION INTRAVENOUS ONCE AS NEEDED
Status: CANCELLED | OUTPATIENT
Start: 2022-06-20

## 2022-06-20 RX ORDER — SODIUM CHLORIDE 0.9 % (FLUSH) 0.9 %
10 SYRINGE (ML) INJECTION AS NEEDED
Status: CANCELLED | OUTPATIENT
Start: 2022-06-20

## 2022-06-20 RX ORDER — SODIUM CHLORIDE 0.9 % (FLUSH) 0.9 %
10 SYRINGE (ML) INJECTION EVERY 12 HOURS SCHEDULED
Status: CANCELLED | OUTPATIENT
Start: 2022-06-20

## 2022-06-20 RX ORDER — OXYTOCIN-SODIUM CHLORIDE 0.9% IV SOLN 30 UNIT/500ML 30-0.9/5 UT/ML-%
999 SOLUTION INTRAVENOUS ONCE
Status: CANCELLED | OUTPATIENT
Start: 2022-06-20

## 2022-06-21 ENCOUNTER — TELEPHONE (OUTPATIENT)
Dept: OBSTETRICS AND GYNECOLOGY | Age: 32
End: 2022-06-21

## 2022-06-21 NOTE — TELEPHONE ENCOUNTER
Called pt to discuss. L&D was not able to bring her in today for IOL. Her partner has to work this PM so they prefer to hold on IOL at this time. He will be off again this Friday so they have requested that day. She will come in tomorrow for visit and BPP. She notes active fetal movement and denies any issues. She agrees to present to L& D with labor symptoms.

## 2022-06-21 NOTE — TELEPHONE ENCOUNTER
Pt called to let us know that he induction was pushed back until 5p today. But she states she cannot make that and needs to reschedule.

## 2022-06-22 ENCOUNTER — ROUTINE PRENATAL (OUTPATIENT)
Dept: OBSTETRICS AND GYNECOLOGY | Age: 32
End: 2022-06-22

## 2022-06-22 VITALS — DIASTOLIC BLOOD PRESSURE: 70 MMHG | SYSTOLIC BLOOD PRESSURE: 120 MMHG | WEIGHT: 136.2 LBS | BODY MASS INDEX: 24.91 KG/M2

## 2022-06-22 DIAGNOSIS — O99.019 MATERNAL ANEMIA IN PREGNANCY, ANTEPARTUM: ICD-10-CM

## 2022-06-22 DIAGNOSIS — Z3A.39 39 WEEKS GESTATION OF PREGNANCY: Primary | ICD-10-CM

## 2022-06-22 DIAGNOSIS — R73.09 ELEVATED GLUCOSE TOLERANCE TEST: ICD-10-CM

## 2022-06-22 DIAGNOSIS — Z86.16 HISTORY OF COVID-19: ICD-10-CM

## 2022-06-22 LAB
GLUCOSE UR STRIP-MCNC: NEGATIVE MG/DL
PROT UR STRIP-MCNC: NEGATIVE MG/DL

## 2022-06-22 PROCEDURE — 99213 OFFICE O/P EST LOW 20 MIN: CPT | Performed by: OBSTETRICS & GYNECOLOGY

## 2022-06-22 NOTE — PROGRESS NOTES
CC: OB visit  HPI: pt presents for routine visit. She was scheduled for IOL for L&D was not able to get her in yesterday AM. Due to spouse's work schedule, pt prefers to hold on IOL until Friday. She notes active fetal movement and denies bleeding/leaking fluid.   She notes her glucose values remain in desired range    ROS:  : neg for reg ctx, vag bleeding or leaking fluid  O: BPP 8/8, ana laura 14.9    A/p: 39 weeks: proceed with IOL later this week per pt request. Reviewed labor warnings and advised daily fmc's    Anemia: pt will continue iron supplement    Elevated one hour: growth appropriate last check at 74 %, pt monitored glucose values and predominantly normal    Hx covid: BPP is reassuring today, continue daily fmc's until delivery

## 2022-06-24 ENCOUNTER — HOSPITAL ENCOUNTER (INPATIENT)
Facility: HOSPITAL | Age: 32
LOS: 2 days | Discharge: HOME | End: 2022-06-26
Attending: OBSTETRICS & GYNECOLOGY | Admitting: OBSTETRICS & GYNECOLOGY

## 2022-06-24 ENCOUNTER — ANESTHESIA (OUTPATIENT)
Dept: LABOR AND DELIVERY | Facility: HOSPITAL | Age: 32
End: 2022-06-24

## 2022-06-24 ENCOUNTER — HOSPITAL ENCOUNTER (INPATIENT)
Dept: LABOR AND DELIVERY | Facility: HOSPITAL | Age: 32
Discharge: HOME | End: 2022-06-24

## 2022-06-24 ENCOUNTER — ANESTHESIA EVENT (OUTPATIENT)
Dept: LABOR AND DELIVERY | Facility: HOSPITAL | Age: 32
End: 2022-06-24

## 2022-06-24 DIAGNOSIS — Z3A.39 39 WEEKS GESTATION OF PREGNANCY: ICD-10-CM

## 2022-06-24 LAB
ABO GROUP BLD: NORMAL
BLD GP AB SCN SERPL QL: NEGATIVE
DEPRECATED RDW RBC AUTO: 39.3 FL (ref 37–54)
ERYTHROCYTE [DISTWIDTH] IN BLOOD BY AUTOMATED COUNT: 12.5 % (ref 12.3–15.4)
HCT VFR BLD AUTO: 32.5 % (ref 34–46.6)
HGB BLD-MCNC: 11.6 G/DL (ref 12–15.9)
MCH RBC QN AUTO: 31 PG (ref 26.6–33)
MCHC RBC AUTO-ENTMCNC: 35.7 G/DL (ref 31.5–35.7)
MCV RBC AUTO: 86.9 FL (ref 79–97)
PLATELET # BLD AUTO: 178 10*3/MM3 (ref 140–450)
PMV BLD AUTO: 9.6 FL (ref 6–12)
RBC # BLD AUTO: 3.74 10*6/MM3 (ref 3.77–5.28)
RH BLD: POSITIVE
SARS-COV-2 RNA PNL SPEC NAA+PROBE: NOT DETECTED
T&S EXPIRATION DATE: NORMAL
WBC NRBC COR # BLD: 5.76 10*3/MM3 (ref 3.4–10.8)

## 2022-06-24 PROCEDURE — 10907ZC DRAINAGE OF AMNIOTIC FLUID, THERAPEUTIC FROM PRODUCTS OF CONCEPTION, VIA NATURAL OR ARTIFICIAL OPENING: ICD-10-PCS | Performed by: OBSTETRICS & GYNECOLOGY

## 2022-06-24 PROCEDURE — C1755 CATHETER, INTRASPINAL: HCPCS | Performed by: ANESTHESIOLOGY

## 2022-06-24 PROCEDURE — 86900 BLOOD TYPING SEROLOGIC ABO: CPT | Performed by: OBSTETRICS & GYNECOLOGY

## 2022-06-24 PROCEDURE — 86901 BLOOD TYPING SEROLOGIC RH(D): CPT | Performed by: OBSTETRICS & GYNECOLOGY

## 2022-06-24 PROCEDURE — 59410 OBSTETRICAL CARE: CPT | Performed by: OBSTETRICS & GYNECOLOGY

## 2022-06-24 PROCEDURE — 87635 SARS-COV-2 COVID-19 AMP PRB: CPT | Performed by: OBSTETRICS & GYNECOLOGY

## 2022-06-24 PROCEDURE — 3E033VJ INTRODUCTION OF OTHER HORMONE INTO PERIPHERAL VEIN, PERCUTANEOUS APPROACH: ICD-10-PCS | Performed by: OBSTETRICS & GYNECOLOGY

## 2022-06-24 PROCEDURE — 85027 COMPLETE CBC AUTOMATED: CPT | Performed by: OBSTETRICS & GYNECOLOGY

## 2022-06-24 PROCEDURE — 86850 RBC ANTIBODY SCREEN: CPT | Performed by: OBSTETRICS & GYNECOLOGY

## 2022-06-24 RX ORDER — PHYTONADIONE 1 MG/.5ML
INJECTION, EMULSION INTRAMUSCULAR; INTRAVENOUS; SUBCUTANEOUS
Status: ACTIVE
Start: 2022-06-24 | End: 2022-06-25

## 2022-06-24 RX ORDER — BISACODYL 10 MG
10 SUPPOSITORY, RECTAL RECTAL DAILY PRN
Status: DISCONTINUED | OUTPATIENT
Start: 2022-06-25 | End: 2022-06-26 | Stop reason: HOSPADM

## 2022-06-24 RX ORDER — PRENATAL VIT/IRON FUM/FOLIC AC 27MG-0.8MG
1 TABLET ORAL DAILY
Status: DISCONTINUED | OUTPATIENT
Start: 2022-06-24 | End: 2022-06-26 | Stop reason: HOSPADM

## 2022-06-24 RX ORDER — FENTANYL CIT 0.2 MG/100ML-ROPIV 0.2%-NACL 0.9% EPIDURAL INJ 2/0.2 MCG/ML-%
10 SOLUTION INJECTION CONTINUOUS
Status: DISCONTINUED | OUTPATIENT
Start: 2022-06-24 | End: 2022-06-24

## 2022-06-24 RX ORDER — SODIUM CHLORIDE 0.9 % (FLUSH) 0.9 %
1-10 SYRINGE (ML) INJECTION AS NEEDED
Status: DISCONTINUED | OUTPATIENT
Start: 2022-06-24 | End: 2022-06-26 | Stop reason: HOSPADM

## 2022-06-24 RX ORDER — HYDROCORTISONE 25 MG/G
1 CREAM TOPICAL AS NEEDED
Status: DISCONTINUED | OUTPATIENT
Start: 2022-06-24 | End: 2022-06-26 | Stop reason: HOSPADM

## 2022-06-24 RX ORDER — ERYTHROMYCIN 5 MG/G
OINTMENT OPHTHALMIC
Status: ACTIVE
Start: 2022-06-24 | End: 2022-06-25

## 2022-06-24 RX ORDER — CARBOPROST TROMETHAMINE 250 UG/ML
250 INJECTION, SOLUTION INTRAMUSCULAR
Status: DISCONTINUED | OUTPATIENT
Start: 2022-06-24 | End: 2022-06-24

## 2022-06-24 RX ORDER — FAMOTIDINE 10 MG/ML
20 INJECTION, SOLUTION INTRAVENOUS ONCE AS NEEDED
Status: DISCONTINUED | OUTPATIENT
Start: 2022-06-24 | End: 2022-06-24

## 2022-06-24 RX ORDER — DOCUSATE SODIUM 100 MG/1
100 CAPSULE, LIQUID FILLED ORAL 2 TIMES DAILY
Status: DISCONTINUED | OUTPATIENT
Start: 2022-06-24 | End: 2022-06-26 | Stop reason: HOSPADM

## 2022-06-24 RX ORDER — SODIUM CHLORIDE, SODIUM LACTATE, POTASSIUM CHLORIDE, CALCIUM CHLORIDE 600; 310; 30; 20 MG/100ML; MG/100ML; MG/100ML; MG/100ML
125 INJECTION, SOLUTION INTRAVENOUS CONTINUOUS
Status: DISCONTINUED | OUTPATIENT
Start: 2022-06-24 | End: 2022-06-24

## 2022-06-24 RX ORDER — OXYTOCIN-SODIUM CHLORIDE 0.9% IV SOLN 30 UNIT/500ML 30-0.9/5 UT/ML-%
125 SOLUTION INTRAVENOUS CONTINUOUS PRN
Status: COMPLETED | OUTPATIENT
Start: 2022-06-24 | End: 2022-06-24

## 2022-06-24 RX ORDER — OXYCODONE HYDROCHLORIDE AND ACETAMINOPHEN 5; 325 MG/1; MG/1
2 TABLET ORAL EVERY 6 HOURS PRN
Status: DISCONTINUED | OUTPATIENT
Start: 2022-06-24 | End: 2022-06-26 | Stop reason: HOSPADM

## 2022-06-24 RX ORDER — ONDANSETRON 2 MG/ML
4 INJECTION INTRAMUSCULAR; INTRAVENOUS ONCE AS NEEDED
Status: DISCONTINUED | OUTPATIENT
Start: 2022-06-24 | End: 2022-06-24

## 2022-06-24 RX ORDER — SODIUM CHLORIDE 0.9 % (FLUSH) 0.9 %
10 SYRINGE (ML) INJECTION AS NEEDED
Status: DISCONTINUED | OUTPATIENT
Start: 2022-06-24 | End: 2022-06-24

## 2022-06-24 RX ORDER — OXYTOCIN-SODIUM CHLORIDE 0.9% IV SOLN 30 UNIT/500ML 30-0.9/5 UT/ML-%
250 SOLUTION INTRAVENOUS CONTINUOUS PRN
Status: ACTIVE | OUTPATIENT
Start: 2022-06-24 | End: 2022-06-24

## 2022-06-24 RX ORDER — OXYTOCIN-SODIUM CHLORIDE 0.9% IV SOLN 30 UNIT/500ML 30-0.9/5 UT/ML-%
2-20 SOLUTION INTRAVENOUS
Status: DISCONTINUED | OUTPATIENT
Start: 2022-06-24 | End: 2022-06-24

## 2022-06-24 RX ORDER — METHYLERGONOVINE MALEATE 0.2 MG/ML
200 INJECTION INTRAVENOUS ONCE AS NEEDED
Status: DISCONTINUED | OUTPATIENT
Start: 2022-06-24 | End: 2022-06-26 | Stop reason: HOSPADM

## 2022-06-24 RX ORDER — MISOPROSTOL 200 UG/1
800 TABLET ORAL ONCE AS NEEDED
Status: DISCONTINUED | OUTPATIENT
Start: 2022-06-24 | End: 2022-06-24

## 2022-06-24 RX ORDER — MAGNESIUM CARB/ALUMINUM HYDROX 105-160MG
30 TABLET,CHEWABLE ORAL ONCE
Status: DISCONTINUED | OUTPATIENT
Start: 2022-06-24 | End: 2022-06-24

## 2022-06-24 RX ORDER — DIPHENHYDRAMINE HYDROCHLORIDE 50 MG/ML
12.5 INJECTION INTRAMUSCULAR; INTRAVENOUS EVERY 8 HOURS PRN
Status: DISCONTINUED | OUTPATIENT
Start: 2022-06-24 | End: 2022-06-24

## 2022-06-24 RX ORDER — SODIUM CHLORIDE 0.9 % (FLUSH) 0.9 %
10 SYRINGE (ML) INJECTION EVERY 12 HOURS SCHEDULED
Status: DISCONTINUED | OUTPATIENT
Start: 2022-06-24 | End: 2022-06-24

## 2022-06-24 RX ORDER — OXYTOCIN-SODIUM CHLORIDE 0.9% IV SOLN 30 UNIT/500ML 30-0.9/5 UT/ML-%
999 SOLUTION INTRAVENOUS ONCE
Status: DISCONTINUED | OUTPATIENT
Start: 2022-06-24 | End: 2022-06-24 | Stop reason: HOSPADM

## 2022-06-24 RX ORDER — METHYLERGONOVINE MALEATE 0.2 MG/ML
200 INJECTION INTRAVENOUS ONCE AS NEEDED
Status: DISCONTINUED | OUTPATIENT
Start: 2022-06-24 | End: 2022-06-24

## 2022-06-24 RX ORDER — EPHEDRINE SULFATE 50 MG/ML
5 INJECTION, SOLUTION INTRAVENOUS AS NEEDED
Status: DISCONTINUED | OUTPATIENT
Start: 2022-06-24 | End: 2022-06-24

## 2022-06-24 RX ORDER — IBUPROFEN 600 MG/1
600 TABLET ORAL EVERY 8 HOURS PRN
Status: DISCONTINUED | OUTPATIENT
Start: 2022-06-24 | End: 2022-06-26 | Stop reason: HOSPADM

## 2022-06-24 RX ORDER — MISOPROSTOL 200 UG/1
600 TABLET ORAL ONCE AS NEEDED
Status: DISCONTINUED | OUTPATIENT
Start: 2022-06-24 | End: 2022-06-26 | Stop reason: HOSPADM

## 2022-06-24 RX ORDER — OXYCODONE HYDROCHLORIDE AND ACETAMINOPHEN 5; 325 MG/1; MG/1
1 TABLET ORAL EVERY 4 HOURS PRN
Status: DISCONTINUED | OUTPATIENT
Start: 2022-06-24 | End: 2022-06-26 | Stop reason: HOSPADM

## 2022-06-24 RX ADMIN — EPHEDRINE SULFATE 10 MG: 50 INJECTION INTRAVENOUS at 12:54

## 2022-06-24 RX ADMIN — DOCUSATE SODIUM 100 MG: 100 CAPSULE, LIQUID FILLED ORAL at 22:32

## 2022-06-24 RX ADMIN — Medication 8 ML/HR: at 12:56

## 2022-06-24 RX ADMIN — OXYTOCIN 2 MILLI-UNITS/MIN: 10 INJECTION, SOLUTION INTRAMUSCULAR; INTRAVENOUS at 10:06

## 2022-06-24 RX ADMIN — SODIUM CHLORIDE, POTASSIUM CHLORIDE, SODIUM LACTATE AND CALCIUM CHLORIDE 999 ML/HR: 600; 310; 30; 20 INJECTION, SOLUTION INTRAVENOUS at 13:05

## 2022-06-24 RX ADMIN — OXYTOCIN-SODIUM CHLORIDE 0.9% IV SOLN 30 UNIT/500ML 125 ML/HR: 30-0.9/5 SOLUTION at 15:30

## 2022-06-24 RX ADMIN — SODIUM CHLORIDE, POTASSIUM CHLORIDE, SODIUM LACTATE AND CALCIUM CHLORIDE 125 ML/HR: 600; 310; 30; 20 INJECTION, SOLUTION INTRAVENOUS at 09:35

## 2022-06-24 NOTE — ANESTHESIA POSTPROCEDURE EVALUATION
Patient: Patsy Motley    Procedure Summary     Date: 06/24/22 Room / Location:     Anesthesia Start: 1238 Anesthesia Stop: 1411    Procedure: LABOR ANALGESIA Diagnosis:     Scheduled Providers:  Provider: Dmitriy Calhoun MD    Anesthesia Type: epidural ASA Status: 2          Anesthesia Type: epidural    Vitals  Vitals Value Taken Time   /64 06/24/22 1501   Temp 36.4 °C (97.6 °F) 06/24/22 1315   Pulse 77 06/24/22 1501   Resp 16 06/24/22 1430   SpO2 100 % 06/24/22 1405   Vitals shown include unvalidated device data.        Anesthesia Post Evaluation

## 2022-06-24 NOTE — ANESTHESIA PROCEDURE NOTES
Labor Epidural    Pre-sedation assessment completed: 6/24/2022 12:47 PM    Patient reassessed immediately prior to procedure    Patient location during procedure: OB  Start Time: 6/24/2022 12:47 PM  Stop Time: 6/24/2022 12:51 PM  Performed By  Anesthesiologist: Dmitriy Calhoun MD  Preanesthetic Checklist  Completed: patient identified, IV checked, site marked, risks and benefits discussed, surgical consent, monitors and equipment checked, pre-op evaluation and timeout performed  Additional Notes  Labor epidural using Arrow kit, no heme/CSF aspirated, no paraesthesias.  Test dose with 3cc 1.5% lido with epi is negative.    Prep:  Pt Position:sitting  Sterile Tech:cap, gloves, mask and sterile barrier  Prep:chlorhexidine gluconate and isopropyl alcohol  Monitoring:blood pressure monitoring, continuous pulse oximetry and EKG  Epidural Block Procedure:  Approach:midline  Guidance:landmark technique and palpation technique  Location:L3-L4  Needle Type:Tuohy  Needle Gauge:17  Loss of Resistance Medium: air  Loss of Resistance: 4cm  Cath Depth at skin:9 cm  Paresthesia: none  Aspiration:negative  Test Dose:negative  Med administered at 6/24/2022 12:51 PM  Number of Attempts: 1  Post Assessment:  Dressing:occlusive dressing applied and secured with tape  Pt Tolerance:patient tolerated the procedure well with no apparent complications  Complications:no

## 2022-06-24 NOTE — ANESTHESIA PREPROCEDURE EVALUATION
Anesthesia Evaluation     Patient summary reviewed and Nursing notes reviewed   no history of anesthetic complications:  NPO Solid Status: > 8 hours  NPO Liquid Status: > 2 hours           Airway   Mallampati: II  TM distance: >3 FB  Neck ROM: full  no difficulty expected  Dental - normal exam     Pulmonary - normal exam   (+) a smoker Former,   (-) COPD, asthma, lung cancer  Cardiovascular - negative cardio ROS and normal exam  Exercise tolerance: excellent (>7 METS)    Rhythm: regular  Rate: normal    (-) hypertension, valvular problems/murmurs, past MI, CAD, dysrhythmias, angina, CHF, cardiac stents      Neuro/Psych- negative ROS  (-) seizures, TIA, CVA  GI/Hepatic/Renal/Endo - negative ROS   (-) hiatal hernia, GERD, PUD, hepatitis, liver disease, no renal disease, diabetes, GI bleed, no thyroid disorder    Musculoskeletal (-) negative ROS    Abdominal  - normal exam   Substance History - negative use     OB/GYN    (+) Pregnant,     Comment: 39wk IUP      Other   blood dyscrasia anemia,                   Anesthesia Plan    ASA 2     epidural       Anesthetic plan, risks, benefits, and alternatives have been provided, discussed and informed consent has been obtained with: patient and spouse/significant other.        CODE STATUS:

## 2022-06-25 LAB
BASOPHILS # BLD AUTO: 0.02 10*3/MM3 (ref 0–0.2)
BASOPHILS NFR BLD AUTO: 0.3 % (ref 0–1.5)
DEPRECATED RDW RBC AUTO: 39.2 FL (ref 37–54)
EOSINOPHIL # BLD AUTO: 0.07 10*3/MM3 (ref 0–0.4)
EOSINOPHIL NFR BLD AUTO: 1 % (ref 0.3–6.2)
ERYTHROCYTE [DISTWIDTH] IN BLOOD BY AUTOMATED COUNT: 12.4 % (ref 12.3–15.4)
HCT VFR BLD AUTO: 30.9 % (ref 34–46.6)
HGB BLD-MCNC: 10.7 G/DL (ref 12–15.9)
IMM GRANULOCYTES # BLD AUTO: 0.03 10*3/MM3 (ref 0–0.05)
IMM GRANULOCYTES NFR BLD AUTO: 0.4 % (ref 0–0.5)
LYMPHOCYTES # BLD AUTO: 1.03 10*3/MM3 (ref 0.7–3.1)
LYMPHOCYTES NFR BLD AUTO: 14.8 % (ref 19.6–45.3)
MCH RBC QN AUTO: 30.4 PG (ref 26.6–33)
MCHC RBC AUTO-ENTMCNC: 34.6 G/DL (ref 31.5–35.7)
MCV RBC AUTO: 87.8 FL (ref 79–97)
MONOCYTES # BLD AUTO: 0.55 10*3/MM3 (ref 0.1–0.9)
MONOCYTES NFR BLD AUTO: 7.9 % (ref 5–12)
NEUTROPHILS NFR BLD AUTO: 5.27 10*3/MM3 (ref 1.7–7)
NEUTROPHILS NFR BLD AUTO: 75.6 % (ref 42.7–76)
NRBC BLD AUTO-RTO: 0 /100 WBC (ref 0–0.2)
PLATELET # BLD AUTO: 147 10*3/MM3 (ref 140–450)
PMV BLD AUTO: 9.3 FL (ref 6–12)
RBC # BLD AUTO: 3.52 10*6/MM3 (ref 3.77–5.28)
WBC NRBC COR # BLD: 6.97 10*3/MM3 (ref 3.4–10.8)

## 2022-06-25 PROCEDURE — 85025 COMPLETE CBC W/AUTO DIFF WBC: CPT | Performed by: OBSTETRICS & GYNECOLOGY

## 2022-06-25 RX ADMIN — Medication 1 APPLICATION: at 10:26

## 2022-06-25 RX ADMIN — IBUPROFEN 600 MG: 600 TABLET, FILM COATED ORAL at 05:45

## 2022-06-25 RX ADMIN — Medication 1 TABLET: at 10:26

## 2022-06-25 RX ADMIN — DOCUSATE SODIUM 100 MG: 100 CAPSULE, LIQUID FILLED ORAL at 10:26

## 2022-06-25 RX ADMIN — IBUPROFEN 600 MG: 600 TABLET, FILM COATED ORAL at 20:43

## 2022-06-25 RX ADMIN — DOCUSATE SODIUM 100 MG: 100 CAPSULE, LIQUID FILLED ORAL at 20:44

## 2022-06-26 VITALS
RESPIRATION RATE: 16 BRPM | BODY MASS INDEX: 25.73 KG/M2 | TEMPERATURE: 97.8 F | SYSTOLIC BLOOD PRESSURE: 109 MMHG | DIASTOLIC BLOOD PRESSURE: 71 MMHG | HEART RATE: 67 BPM | OXYGEN SATURATION: 100 % | HEIGHT: 61 IN

## 2022-06-26 RX ORDER — IBUPROFEN 600 MG/1
600 TABLET ORAL EVERY 8 HOURS PRN
Qty: 30 TABLET | Refills: 0 | Status: SHIPPED | OUTPATIENT
Start: 2022-06-26

## 2022-06-26 RX ORDER — OXYCODONE HYDROCHLORIDE AND ACETAMINOPHEN 5; 325 MG/1; MG/1
1 TABLET ORAL EVERY 4 HOURS PRN
Qty: 10 TABLET | Refills: 0 | Status: SHIPPED | OUTPATIENT
Start: 2022-06-26 | End: 2022-07-01

## 2022-06-26 RX ADMIN — Medication 1 TABLET: at 11:26

## 2022-06-26 RX ADMIN — IBUPROFEN 600 MG: 600 TABLET, FILM COATED ORAL at 04:58

## 2022-06-26 RX ADMIN — DOCUSATE SODIUM 100 MG: 100 CAPSULE, LIQUID FILLED ORAL at 11:26

## 2022-07-01 NOTE — PROGRESS NOTES
Continued Stay Note  [facility]     Patient Name: Patsy Motley  MRN: [id number]  Today's Date: 7/1/2022    Admit Date: 6/24/2022     Discharge Plan     Row Name 07/01/22 0926       Plan    Plan Comments Mother: Patsy Motley, MRN [id number]. Infant: Breanne “Rosamaria” Tiesha, MRN [id number]. CSW has reviewed infant’s cord toxicology results and they were negative mandatory CPS reporting is not required at this time. TODD Moody               Discharge Codes    No documentation.               Expected Discharge Date and Time     Expected Discharge Date Expected Discharge Time    Jun 26, 2022             PARAS Bolden

## 2022-08-08 PROBLEM — R73.09 ELEVATED GLUCOSE TOLERANCE TEST: Status: RESOLVED | Noted: 2022-03-09 | Resolved: 2022-08-08

## 2022-08-08 PROBLEM — O99.019 MATERNAL ANEMIA IN PREGNANCY, ANTEPARTUM: Status: RESOLVED | Noted: 2022-03-08 | Resolved: 2022-08-08

## 2022-08-08 PROBLEM — Z3A.39 39 WEEKS GESTATION OF PREGNANCY: Status: RESOLVED | Noted: 2022-06-24 | Resolved: 2022-08-08

## 2022-08-08 NOTE — PROGRESS NOTES
Subjective   Patsy Motley is a 32 y.o. female who presents for a postpartum visit. She is 6 weeks postpartum following a spontaneous vaginal delivery. I have fully reviewed the prenatal and intrapartum course. The delivery was at 39 gestational weeks. Outcome: spontaneous vaginal delivery. Anesthesia: epidural. Postpartum course has been uncomplicated. Baby's course has been normal. Baby is feeding by breast. Bleeding no bleeding. Bowel function is normal. Bladder function is normal. Patient is not sexually active. Contraception method is oral progesterone-only contraceptive. Postpartum depression screening: negative.    The following portions of the patient's history were reviewed and updated as appropriate: allergies, current medications, past family history, past medical history, past social history, past surgical history and problem list.    Review of Systems  Pertinent items are noted in HPI.    Objective   LMP 09/22/2021    General:  alert, appears stated age, cooperative and no distress    Breasts:  def   Lungs: clear to auscultation bilaterally   Heart:  regular rate and rhythm   Abdomen: soft, nontender    Vulva:  normal   Vagina: normal vagina, no discharge, exudate, lesion, or erythema   Cervix:  no lesions   Corpus: normal size, contour, position, consistency, mobility, non-tender   Adnexa:  no mass, fullness, tenderness   Rectal Exam: Not performed.     Assessment & Plan   Normal postpartum exam. Pap smear not done at today's visit as up to date ( neg pap and hpv 12/21)    1. Contraception: oral progesterone-only contraceptive--instructions reviewed  2. Pt denies any issues with breastfeeding, plan f/u prn  3. Follow up in: 6 months for annual or as needed.

## 2022-08-09 ENCOUNTER — POSTPARTUM VISIT (OUTPATIENT)
Dept: OBSTETRICS AND GYNECOLOGY | Age: 32
End: 2022-08-09

## 2022-08-09 VITALS
BODY MASS INDEX: 22.88 KG/M2 | HEIGHT: 61 IN | WEIGHT: 121.2 LBS | SYSTOLIC BLOOD PRESSURE: 120 MMHG | DIASTOLIC BLOOD PRESSURE: 62 MMHG

## 2022-08-09 PROBLEM — F12.90 MARIJUANA USE: Status: RESOLVED | Noted: 2021-11-24 | Resolved: 2022-08-09

## 2022-08-09 PROBLEM — Z86.16 HISTORY OF COVID-19: Status: RESOLVED | Noted: 2022-01-25 | Resolved: 2022-08-09

## 2022-08-09 PROCEDURE — 0503F POSTPARTUM CARE VISIT: CPT | Performed by: OBSTETRICS & GYNECOLOGY

## 2022-08-09 RX ORDER — ACETAMINOPHEN AND CODEINE PHOSPHATE 120; 12 MG/5ML; MG/5ML
1 SOLUTION ORAL DAILY
Qty: 84 TABLET | Refills: 4 | Status: SHIPPED | OUTPATIENT
Start: 2022-08-09

## 2023-03-21 ENCOUNTER — TELEPHONE (OUTPATIENT)
Dept: OBSTETRICS AND GYNECOLOGY | Age: 33
End: 2023-03-21

## 2023-03-21 NOTE — TELEPHONE ENCOUNTER
Caller: Patsy Motley    Relationship to patient: Self    Best call back number: [phone]    Patient is needing: PATIENT CANCELED APPT TODAY. TAKING DAUGHTER TO ER. PATIENT DID NOT R/S AT THIS TIME

## 2023-07-31 ENCOUNTER — TELEPHONE (OUTPATIENT)
Dept: OBSTETRICS AND GYNECOLOGY | Facility: CLINIC | Age: 33
End: 2023-07-31
Payer: MEDICAID

## 2023-08-03 ENCOUNTER — INITIAL PRENATAL (OUTPATIENT)
Dept: OBSTETRICS AND GYNECOLOGY | Facility: CLINIC | Age: 33
End: 2023-08-03
Payer: MEDICAID

## 2023-08-03 VITALS — SYSTOLIC BLOOD PRESSURE: 111 MMHG | BODY MASS INDEX: 24.19 KG/M2 | WEIGHT: 128 LBS | DIASTOLIC BLOOD PRESSURE: 71 MMHG

## 2023-08-03 DIAGNOSIS — Z34.80 SUPERVISION OF OTHER NORMAL PREGNANCY, ANTEPARTUM: Primary | ICD-10-CM

## 2023-08-03 DIAGNOSIS — O09.299 HISTORY OF GESTATIONAL DIABETES IN PRIOR PREGNANCY, CURRENTLY PREGNANT: ICD-10-CM

## 2023-08-03 DIAGNOSIS — Z86.32 HISTORY OF GESTATIONAL DIABETES IN PRIOR PREGNANCY, CURRENTLY PREGNANT: ICD-10-CM

## 2023-08-03 LAB
ABO GROUP BLD: NORMAL
B-HCG UR QL: POSITIVE
BASOPHILS # BLD AUTO: 0.01 10*3/MM3 (ref 0–0.2)
BASOPHILS NFR BLD AUTO: 0.2 % (ref 0–1.5)
BLD GP AB SCN SERPL QL: NEGATIVE
DEPRECATED RDW RBC AUTO: 37.6 FL (ref 37–54)
EOSINOPHIL # BLD AUTO: 0.04 10*3/MM3 (ref 0–0.4)
EOSINOPHIL NFR BLD AUTO: 0.7 % (ref 0.3–6.2)
ERYTHROCYTE [DISTWIDTH] IN BLOOD BY AUTOMATED COUNT: 12 % (ref 12.3–15.4)
EXPIRATION DATE: ABNORMAL
GLUCOSE 1H P GLC SERPL-MCNC: 199 MG/DL (ref 65–139)
GLUCOSE UR STRIP-MCNC: NEGATIVE MG/DL
HBV SURFACE AG SERPL QL IA: NORMAL
HCT VFR BLD AUTO: 31.1 % (ref 34–46.6)
HCV AB SER DONR QL: NORMAL
HGB BLD-MCNC: 10.5 G/DL (ref 12–15.9)
HIV1+2 AB SER QL: NORMAL
IMM GRANULOCYTES # BLD AUTO: 0.03 10*3/MM3 (ref 0–0.05)
IMM GRANULOCYTES NFR BLD AUTO: 0.5 % (ref 0–0.5)
INTERNAL NEGATIVE CONTROL: NEGATIVE
INTERNAL POSITIVE CONTROL: ABNORMAL
LYMPHOCYTES # BLD AUTO: 0.92 10*3/MM3 (ref 0.7–3.1)
LYMPHOCYTES NFR BLD AUTO: 15.5 % (ref 19.6–45.3)
Lab: ABNORMAL
MCH RBC QN AUTO: 29.1 PG (ref 26.6–33)
MCHC RBC AUTO-ENTMCNC: 33.8 G/DL (ref 31.5–35.7)
MCV RBC AUTO: 86.1 FL (ref 79–97)
MONOCYTES # BLD AUTO: 0.34 10*3/MM3 (ref 0.1–0.9)
MONOCYTES NFR BLD AUTO: 5.7 % (ref 5–12)
NEUTROPHILS NFR BLD AUTO: 4.59 10*3/MM3 (ref 1.7–7)
NEUTROPHILS NFR BLD AUTO: 77.4 % (ref 42.7–76)
NRBC BLD AUTO-RTO: 0 /100 WBC (ref 0–0.2)
PLATELET # BLD AUTO: 237 10*3/MM3 (ref 140–450)
PMV BLD AUTO: 9.4 FL (ref 6–12)
PROT UR STRIP-MCNC: NEGATIVE MG/DL
RBC # BLD AUTO: 3.61 10*6/MM3 (ref 3.77–5.28)
RH BLD: POSITIVE
T PALLIDUM IGG SER QL: NORMAL
WBC NRBC COR # BLD: 5.93 10*3/MM3 (ref 3.4–10.8)

## 2023-08-03 PROCEDURE — 86803 HEPATITIS C AB TEST: CPT | Performed by: NURSE PRACTITIONER

## 2023-08-03 PROCEDURE — 82950 GLUCOSE TEST: CPT | Performed by: NURSE PRACTITIONER

## 2023-08-03 PROCEDURE — 85025 COMPLETE CBC W/AUTO DIFF WBC: CPT | Performed by: NURSE PRACTITIONER

## 2023-08-03 PROCEDURE — 86850 RBC ANTIBODY SCREEN: CPT | Performed by: NURSE PRACTITIONER

## 2023-08-03 PROCEDURE — 87340 HEPATITIS B SURFACE AG IA: CPT | Performed by: NURSE PRACTITIONER

## 2023-08-03 PROCEDURE — 87086 URINE CULTURE/COLONY COUNT: CPT | Performed by: NURSE PRACTITIONER

## 2023-08-03 PROCEDURE — 86900 BLOOD TYPING SEROLOGIC ABO: CPT | Performed by: NURSE PRACTITIONER

## 2023-08-03 PROCEDURE — 86780 TREPONEMA PALLIDUM: CPT | Performed by: NURSE PRACTITIONER

## 2023-08-03 PROCEDURE — 86901 BLOOD TYPING SEROLOGIC RH(D): CPT | Performed by: NURSE PRACTITIONER

## 2023-08-03 PROCEDURE — G0432 EIA HIV-1/HIV-2 SCREEN: HCPCS | Performed by: NURSE PRACTITIONER

## 2023-08-04 ENCOUNTER — TELEPHONE (OUTPATIENT)
Dept: OBSTETRICS AND GYNECOLOGY | Facility: CLINIC | Age: 33
End: 2023-08-04
Payer: MEDICAID

## 2023-08-04 DIAGNOSIS — O24.319 PREGESTATIONAL DIABETES MELLITUS, MODIFIED WHITE CLASS B: Primary | ICD-10-CM

## 2023-08-04 DIAGNOSIS — O99.019 MATERNAL ANEMIA IN PREGNANCY, ANTEPARTUM: ICD-10-CM

## 2023-08-04 RX ORDER — FERROUS SULFATE 325(65) MG
325 TABLET ORAL EVERY OTHER DAY
Qty: 15 TABLET | Refills: 4 | Status: SHIPPED | OUTPATIENT
Start: 2023-08-04 | End: 2024-01-01

## 2023-08-04 RX ORDER — BLOOD-GLUCOSE METER
KIT MISCELLANEOUS
Qty: 200 EACH | Refills: 12 | Status: SHIPPED | OUTPATIENT
Start: 2023-08-04

## 2023-08-04 RX ORDER — LANCETS 28 GAUGE
EACH MISCELLANEOUS
Qty: 200 EACH | Refills: 12 | Status: SHIPPED | OUTPATIENT
Start: 2023-08-04

## 2023-08-05 LAB
BACTERIA SPEC AEROBE CULT: NORMAL
RUBV IGG SERPL IA-ACNC: 4.85 INDEX

## 2023-08-06 LAB
C TRACH RRNA SPEC QL NAA+PROBE: NEGATIVE
N GONORRHOEA RRNA SPEC QL NAA+PROBE: NEGATIVE
T VAGINALIS RRNA SPEC QL NAA+PROBE: NEGATIVE

## 2023-08-08 ENCOUNTER — TELEPHONE (OUTPATIENT)
Dept: OBSTETRICS AND GYNECOLOGY | Facility: CLINIC | Age: 33
End: 2023-08-08
Payer: MEDICAID

## 2023-08-08 NOTE — TELEPHONE ENCOUNTER
----- Message from MARTHA Song sent at 8/8/2023 12:53 PM EDT -----  Please let patient know her NIPS screen is negative, fetus is female if she wants to know gender.

## 2023-08-11 ENCOUNTER — HOSPITAL ENCOUNTER (OUTPATIENT)
Dept: ULTRASOUND IMAGING | Facility: HOSPITAL | Age: 33
Discharge: HOME | End: 2023-08-11
Admitting: NURSE PRACTITIONER
Payer: MEDICAID

## 2023-08-11 ENCOUNTER — TRANSCRIBE ORDERS (OUTPATIENT)
Dept: ADMINISTRATIVE | Facility: HOSPITAL | Age: 33
End: 2023-08-11
Payer: MEDICAID

## 2023-08-11 DIAGNOSIS — Z34.80 SUPERVISION OF OTHER NORMAL PREGNANCY, ANTEPARTUM: ICD-10-CM

## 2023-08-11 PROCEDURE — 76801 OB US < 14 WKS SINGLE FETUS: CPT

## 2023-08-14 ENCOUNTER — TELEPHONE (OUTPATIENT)
Dept: OBSTETRICS AND GYNECOLOGY | Facility: CLINIC | Age: 33
End: 2023-08-14
Payer: MEDICAID

## 2023-08-14 NOTE — TELEPHONE ENCOUNTER
----- Message from MARTHA Song sent at 8/14/2023  9:50 AM EDT -----  Please let patient know her ultrasound shows a viable IUP which measured 11w5d, confirming her EVERETT of 2/27/24.  Heart rate was 157.  Recommend she keep her next scheduled appointment.

## 2023-08-21 ENCOUNTER — REFERRAL TRIAGE (OUTPATIENT)
Dept: LABOR AND DELIVERY | Facility: HOSPITAL | Age: 33
End: 2023-08-21
Payer: MEDICAID

## 2023-08-31 ENCOUNTER — ROUTINE PRENATAL (OUTPATIENT)
Dept: OBSTETRICS AND GYNECOLOGY | Facility: CLINIC | Age: 33
End: 2023-08-31
Payer: MEDICAID

## 2023-08-31 VITALS — BODY MASS INDEX: 23.24 KG/M2 | SYSTOLIC BLOOD PRESSURE: 104 MMHG | WEIGHT: 123 LBS | DIASTOLIC BLOOD PRESSURE: 68 MMHG

## 2023-08-31 DIAGNOSIS — O09.299 HISTORY OF GESTATIONAL DIABETES IN PRIOR PREGNANCY, CURRENTLY PREGNANT: ICD-10-CM

## 2023-08-31 DIAGNOSIS — Z34.80 SUPERVISION OF OTHER NORMAL PREGNANCY, ANTEPARTUM: Primary | ICD-10-CM

## 2023-08-31 DIAGNOSIS — Z86.32 HISTORY OF GESTATIONAL DIABETES IN PRIOR PREGNANCY, CURRENTLY PREGNANT: ICD-10-CM

## 2023-08-31 DIAGNOSIS — O99.019 MATERNAL ANEMIA IN PREGNANCY, ANTEPARTUM: ICD-10-CM

## 2023-08-31 PROBLEM — O24.319 PREGESTATIONAL DIABETES MELLITUS, MODIFIED WHITE CLASS B: Status: RESOLVED | Noted: 2023-08-04 | Resolved: 2023-08-31

## 2023-08-31 LAB
GLUCOSE UR STRIP-MCNC: NEGATIVE MG/DL
HBA1C MFR BLD: 4.9 % (ref 4.8–5.6)
PROT UR STRIP-MCNC: NEGATIVE MG/DL

## 2023-08-31 PROCEDURE — 83036 HEMOGLOBIN GLYCOSYLATED A1C: CPT | Performed by: STUDENT IN AN ORGANIZED HEALTH CARE EDUCATION/TRAINING PROGRAM

## 2023-08-31 RX ORDER — ASPIRIN 81 MG/1
81 TABLET ORAL DAILY
Qty: 30 TABLET | Refills: 11 | Status: SHIPPED | OUTPATIENT
Start: 2023-08-31

## 2023-08-31 NOTE — PROGRESS NOTES
OB FOLLOW UP  Complaint   Chief Complaint   Patient presents with    Routine Prenatal Visit            Patsy Motley is a 33 y.o.  14w2d patient being seen today for her obstetrical follow up visit. Patient denies decreased fetal movement, contractions, loss of fluid or vaginal bleeding.  Has been taking prenatal vitamin.  Has been sporadically checking blood sugars at home.  Taking iron supplementation.  No other acute complaints.    Her prenatal care is complicated by (and status) :    Patient Active Problem List   Diagnosis    Supervision of other normal pregnancy, antepartum    History of gestational diabetes in prior pregnancy, currently pregnant    Maternal anemia in pregnancy, antepartum       All other systems reviewed and are negative.     The additional following portions of the patient's history were reviewed and updated as appropriate: allergies, current medications, past family history, past medical history, past social history, past surgical history, and problem list.      EXAM:     Vital signs: /68   Wt 55.8 kg (123 lb)   LMP 2023   BMI 23.24 kg/mý   Appearance/psychiatric: To be in no distress  Constitutional: The patient is well nourished.  Cardiovascular: She does not have edema.  Respiratory: Respiratory effort is normal.  Gastrointestinal: Abdomen is soft, gravid, nontender, no rashes, heart tones are present, fundal height is size equals dates    Pelvic Exam: No    Urine glucose/protein: See prenatal flowsheet       Assessment and Plan    Problem List Items Addressed This Visit          Gravid and     History of gestational diabetes in prior pregnancy, currently pregnant    Overview     2023 Early 1 hour 199. Does not meet diagnostic criteria for diabetes; HgbA1C ordered. Continue with FSBS 4 times a day. Send values in 2 weeks to determine if medication required or not.          Supervision of other normal pregnancy, antepartum - Primary    Overview      EVERETT finalized: 2/27/24 per LMP, 11-week US and ACOG    Genetic testing (NIPS-Quad)/CF/AFP:  CF negative, NIPS negative    COVID: Recommended 8/3/23  Flu: Recommended 8/3/23  Tdap:    Rhogam:  ? Desires Sterilization:    Anatomy US:  FU US:    PROBLEM LIST/PLAN:   Hx of GDM - early 1hGTT             Relevant Medications    aspirin 81 MG EC tablet    Other Relevant Orders    POC Urinalysis Dipstick (Completed)    Hemoglobin A1c       Hematology and Neoplasia    Maternal anemia in pregnancy, antepartum    Overview     8/31/2023 Iron QOD.          Relevant Medications    ferrous sulfate 325 (65 FE) MG tablet       Impression  Pregnancy at 14w2d  Fetal status reassuring.   Activity and Exercise discussed.    Plan  Elevated 1 hour glucose at 199.  This does not meet diagnostic criteria for diabetes.  Hemoglobin A1c collected today.  No glucose in urine.  Diet and exercise recommended.  Send labs in 2 weeks for clinician review  Begin baby aspirin daily  Return office in 4 weeks      Patient was counseled to the following pregnancy precautions:  Decreased fetal movement, if concern for decreased fetal movement please perform fetal kick counts you are looking for 10 movements in 2 hours.  If concern for fetal movement and not meeting that criteria, please present to triage for evaluation.  Contractions occurring every 5 minutes for over an hour, lasting 30 to 60 seconds and progressively causing more discomfort, please seek medical attention to rule out labor  If you believe that your water is broken, place a sanitary pad.  If pad fills in short period of time i.e. less than 5 minutes, take off pad placed another pad.  If this is saturated please present for rule out rupture of membranes  Vaginal bleeding can be normal in pregnancy, this usually takes a form of spotting.  If having heavier bleeding like a menstrual period please present for evaluation; especially in light of severe abdominal pain this could represent a  placental abruption.  Keep all scheduled appointments as recommended.        Yogesh Sutherland MD  08/31/2023

## 2023-09-26 ENCOUNTER — PATIENT OUTREACH (OUTPATIENT)
Dept: LABOR AND DELIVERY | Facility: HOSPITAL | Age: 33
End: 2023-09-26
Payer: MEDICAID

## 2023-09-26 ENCOUNTER — ROUTINE PRENATAL (OUTPATIENT)
Dept: OBSTETRICS AND GYNECOLOGY | Facility: CLINIC | Age: 33
End: 2023-09-26
Payer: MEDICAID

## 2023-09-26 VITALS — WEIGHT: 127 LBS | SYSTOLIC BLOOD PRESSURE: 99 MMHG | BODY MASS INDEX: 24 KG/M2 | DIASTOLIC BLOOD PRESSURE: 68 MMHG

## 2023-09-26 DIAGNOSIS — O99.019 MATERNAL ANEMIA IN PREGNANCY, ANTEPARTUM: ICD-10-CM

## 2023-09-26 DIAGNOSIS — Z34.80 SUPERVISION OF OTHER NORMAL PREGNANCY, ANTEPARTUM: Primary | ICD-10-CM

## 2023-09-26 LAB
GLUCOSE UR STRIP-MCNC: NEGATIVE MG/DL
PROT UR STRIP-MCNC: NEGATIVE MG/DL

## 2023-09-26 RX ORDER — FERROUS SULFATE 325(65) MG
325 TABLET ORAL EVERY OTHER DAY
Qty: 15 TABLET | Refills: 4 | Status: SHIPPED | OUTPATIENT
Start: 2023-09-26 | End: 2024-02-23

## 2023-09-26 NOTE — OUTREACH NOTE
[facility]  Enrollment    Current Estimated Gestational Age: 18w0d    Questions/Answers      Flowsheet Row Responses   Would like to participate? Yes            Information given about program, second trimester, and insurance free benefits. Encouraged to reach out for any concerns.     Darlene Hodge RN  Maternity Nurse Navigator    9/26/2023, 13:19 EDT

## 2023-09-26 NOTE — PROGRESS NOTES
OB FOLLOW UP  Complaint   Chief Complaint   Patient presents with    Routine Prenatal Visit            Patsy Motley is a 33 y.o.  18w0d patient being seen today for her obstetrical follow up visit. Patient denies decreased fetal movement, contractions, loss of fluid or vaginal bleeding.  No acute complaints.  Checking fingerstick blood sugars 5 times a day.  Is in need of refill for iron supplementation.  Compliant with baby aspirin and prenatal vitamin.    Her prenatal care is complicated by (and status) :    Patient Active Problem List   Diagnosis    Supervision of other normal pregnancy, antepartum    History of gestational diabetes in prior pregnancy, currently pregnant    Maternal anemia in pregnancy, antepartum       All other systems reviewed and are negative.     The additional following portions of the patient's history were reviewed and updated as appropriate: allergies, current medications, past family history, past medical history, past social history, past surgical history, and problem list.      EXAM:     Vital signs: BP 99/68   Wt 57.6 kg (127 lb)   LMP 2023   BMI 24.00 kg/m²   Appearance/psychiatric: To be in no distress  Constitutional: The patient is well nourished.  Cardiovascular: She does not have edema.  Respiratory: Respiratory effort is normal.  Gastrointestinal: Abdomen is soft, gravid, nontender, no rashes, heart tones are present, fundal height is size equals dates    Pelvic Exam: No    Urine glucose/protein: See prenatal flowsheet       Assessment and Plan    Problem List Items Addressed This Visit          Gravid and     Supervision of other normal pregnancy, antepartum - Primary    Overview     EVERETT finalized: 24 per LMP, 11-week US and ACOG    Genetic testing (NIPS-Quad)/CF/AFP:  CF negative, NIPS negative    COVID: Recommended 8/3/23  Flu: Recommended 8/3/23  Tdap:    Rhogam:  ? Desires Sterilization:    Anatomy US:  FU US:    PROBLEM LIST/PLAN:   Hx  of GDM - early 1hGTT             Relevant Medications    aspirin 81 MG EC tablet    Other Relevant Orders    POC Urinalysis Dipstick (Completed)    US Ob Detail Fetal Anatomy Single or First Gestation       Hematology and Neoplasia    Maternal anemia in pregnancy, antepartum    Overview     8/31/2023 Iron QOD.          Relevant Medications    ferrous sulfate 325 (65 FE) MG tablet       Impression  Pregnancy at 18w0d  Fetal status reassuring.   Activity and Exercise discussed.    Plan  Anatomy ultrasound ordered to be performed between 20 and 22 weeks gestational age  Review of fingerstick blood sugars overall well controlled.  Congratulations with changes to dietary habits.  Patient to continue fingerstick blood sugars 4 times per day.  Continue with prenatal vitamin and baby aspirin  Refill on iron to pharmacy  Return to office in 4 weeks      Patient was counseled to the following pregnancy precautions:  Decreased fetal movement, if concern for decreased fetal movement please perform fetal kick counts you are looking for 10 movements in 2 hours.  If concern for fetal movement and not meeting that criteria, please present to triage for evaluation.  Contractions occurring every 5 minutes for over an hour, lasting 30 to 60 seconds and progressively causing more discomfort, please seek medical attention to rule out labor  If you believe that your water is broken, place a sanitary pad.  If pad fills in short period of time i.e. less than 5 minutes, take off pad placed another pad.  If this is saturated please present for rule out rupture of membranes  Vaginal bleeding can be normal in pregnancy, this usually takes a form of spotting.  If having heavier bleeding like a menstrual period please present for evaluation; especially in light of severe abdominal pain this could represent a placental abruption.  Keep all scheduled appointments as recommended.        Yogesh Sutherland MD  09/26/2023

## 2023-10-20 NOTE — ASSESSMENT & PLAN NOTE
EVERETT finalized: 2/27/24 per LMP, 11-week US and ACOG     Genetic testing (NIPS-Quad)/CF/AFP:  CF negative, NIPS negative     COVID: Recommended 8/3/23  Flu: Recommended 8/3/23  Tdap:     Rhogam: Opos  ? Desires Sterilization:     Anatomy US: EFW 46.8%, AC 50%, MVP 5.57, transverse presentation, anterior grade 1 placenta, normal anatomy and cardiac survey, female,  bpm  FU US:     PROBLEM LIST/PLAN:   Hx of GDM - early 1hGTT

## 2023-10-23 NOTE — PROGRESS NOTES
Routine Prenatal Visit     Subjective  Patsy Motley is a 33 y.o.  at 22w0d here for her routine OB visit.   She is taking her prenatal vitamins.Reports no loss of fluid or vaginal bleeding. Patient doing well without any complaints.  Patient states she lost her glucose meter and has not been able to check her blood glucoses at home.  She states she is okay with doing a 3-hour GTT.  Pregnancy is complicated by:     Patient Active Problem List   Diagnosis    Supervision of other normal pregnancy, antepartum    History of gestational diabetes in prior pregnancy, currently pregnant    Maternal anemia in pregnancy, antepartum         OB History    Para Term  AB Living   6 4 4   1 4   SAB IAB Ectopic Molar Multiple Live Births   1       0 4      # Outcome Date GA Lbr Tyrone/2nd Weight Sex Delivery Anes PTL Lv   6 Current            5 Term 22 39w5d 01:27 / 00:16 3354 g (7 lb 6.3 oz) F Vag-Spont EPI N AMARJIT      Birth Comments: scale 4   4 Term 11 39w0d  2807 g (6 lb 3 oz) F Vag-Spont EPI N AMARJIT   3 Term 05/22/10 39w0d  2892 g (6 lb 6 oz) F Vag-Spont  N AMARJIT   2 Term 09 40w0d  3402 g (7 lb 8 oz) M Vag-Spont EPI N AMARJIT   1 2007 14w0d    SAB              ROS:   General ROS: negative for - chills or fatigue  Respiratory ROS: negative for - cough or hemoptysis  Cardiovascular ROS: negative for - chest pain or dyspnea on exertion  Genito-Urinary ROS: negative for  change in urinary stream, vaginal discharge   Musculoskeletal ROS: negative for - gait disturbance or joint pain  Dermatological ROS: negative for acne,  dry skin or itching    Objective  Physical Exam:   Vitals:    10/24/23 1452   BP: 113/76       Uterine Size: not examined, US today  FHT: 110-160 BPM    General appearance - alert, well appearing, and in no distress  Mental status - alert, oriented to person, place, and time  Abdomen- Soft, Gravid uterus, non-tender to palpation  Musculoskeletal: negative for - gait  disturbance or joint pain  Extremeties: negative swelling or cyanosis   Dermatological: negative rashes or skin lesions       Assessment/Plan:   Diagnoses and all orders for this visit:    1. Supervision of other normal pregnancy, antepartum (Primary)  Assessment & Plan:  EVERETT finalized: 2/27/24 per LMP, 11-week US and ACOG     Genetic testing (NIPS-Quad)/CF/AFP:  CF negative, NIPS negative     COVID: Recommended 8/3/23  Flu: Recommended 8/3/23  Tdap:     Rhogam: Opos  ? Desires Sterilization:     Anatomy US: EFW 46.8%, AC 50%, MVP 5.57, transverse presentation, anterior grade 1 placenta, normal anatomy and cardiac survey, female,  bpm  FU US:     PROBLEM LIST/PLAN:   Hx of GDM - early 1hGTT       Orders:  -     POC Urinalysis Dipstick    2. History of gestational diabetes in prior pregnancy, currently pregnant  Assessment & Plan:  8/31/2023 Early 1 hour 199. Does not meet diagnostic criteria for diabetes; HgbA1C ordered. Continue with FSBS 4 times a day. Send values in 2 weeks to determine if medication required or not.   9/1/2023 HgbA1C 4.90   9/26/2023 fastings within normal limits.  Normal 2 hours after breakfast.  2 elevations in postprandial after lunch, 1 elevation after dinner.  Continue with dietary changes.  Discussed with patient holding fingerstick blood sugars until reassessment at 24 weeks versus continue fingerstick blood sugars.  Patient okay with continued fingerstick blood sugars.  10/24/2023 she has not been checking blood sugars due to lost BG meter.  POC glucose today 82.  Recommend diagnostic 3-hour GTT.    Orders:  -     Gestational, Glucose Tolerance, 3 Hour; Future    3. Maternal anemia in pregnancy, antepartum        Counseling:   Second trimester precautions  Round Ligament Pain:  The uterus has several ligaments which provide support and keep the uterus in place. As the  uterus grows these ligaments are pulled and stretched which often causes sharp stabbing like pain in the inguinal  area.   You may find a pregnancy support band helpful. Changing positions may also help. Yoga is a great way to cope with round ligament and low back pain in pregnancy.    Massage may also help with low back pain   Things to Consider at this Point in your Pregnancy:  Some women experience swelling in their feet during pregnancy. Compression stockings may help  Drink plenty of water and stay active   Make sure you are eating frequent small meals, nuts are a wonderful snack to keep with you            Return in about 4 weeks (around 11/21/2023) for Routine OB visit.      We have gone over prenatal care to include the timing and content of visits. I informed her how to contact the office and/or on call person in the event of any problems and encouraged her to do so when she feels it is necessary.  We then spent time answering her questions which she indicated were answered to her satisfaction.    Mindy Flores DO  10/24/2023 15:35 EDT

## 2023-10-24 ENCOUNTER — TELEPHONE (OUTPATIENT)
Dept: OBSTETRICS AND GYNECOLOGY | Facility: CLINIC | Age: 33
End: 2023-10-24

## 2023-10-24 ENCOUNTER — ROUTINE PRENATAL (OUTPATIENT)
Dept: OBSTETRICS AND GYNECOLOGY | Facility: CLINIC | Age: 33
End: 2023-10-24
Payer: MEDICAID

## 2023-10-24 VITALS — WEIGHT: 127.8 LBS | DIASTOLIC BLOOD PRESSURE: 76 MMHG | BODY MASS INDEX: 24.15 KG/M2 | SYSTOLIC BLOOD PRESSURE: 113 MMHG

## 2023-10-24 DIAGNOSIS — O99.019 MATERNAL ANEMIA IN PREGNANCY, ANTEPARTUM: ICD-10-CM

## 2023-10-24 DIAGNOSIS — Z86.32 HISTORY OF GESTATIONAL DIABETES IN PRIOR PREGNANCY, CURRENTLY PREGNANT: ICD-10-CM

## 2023-10-24 DIAGNOSIS — Z34.80 SUPERVISION OF OTHER NORMAL PREGNANCY, ANTEPARTUM: Primary | ICD-10-CM

## 2023-10-24 DIAGNOSIS — O09.299 HISTORY OF GESTATIONAL DIABETES IN PRIOR PREGNANCY, CURRENTLY PREGNANT: ICD-10-CM

## 2023-10-24 LAB
GLUCOSE UR STRIP-MCNC: NEGATIVE MG/DL
PROT UR STRIP-MCNC: NEGATIVE MG/DL

## 2023-10-24 NOTE — ASSESSMENT & PLAN NOTE
8/31/2023 Early 1 hour 199. Does not meet diagnostic criteria for diabetes; HgbA1C ordered. Continue with FSBS 4 times a day. Send values in 2 weeks to determine if medication required or not.   9/1/2023 HgbA1C 4.90   9/26/2023 fastings within normal limits.  Normal 2 hours after breakfast.  2 elevations in postprandial after lunch, 1 elevation after dinner.  Continue with dietary changes.  Discussed with patient holding fingerstick blood sugars until reassessment at 24 weeks versus continue fingerstick blood sugars.  Patient okay with continued fingerstick blood sugars.  10/24/2023 she has not been checking blood sugars due to lost BG meter.  POC glucose today 82.  Recommend diagnostic 3-hour GTT.

## 2023-11-08 ENCOUNTER — TELEPHONE (OUTPATIENT)
Dept: OBSTETRICS AND GYNECOLOGY | Facility: CLINIC | Age: 33
End: 2023-11-08
Payer: MEDICAID

## 2023-11-08 NOTE — TELEPHONE ENCOUNTER
Okay'd refill on PNV x 1 year.  Called to [facility].  Last seen 10/24/23.  Next appointment 11/20/23. Called patient informed Rx @ pharmacy

## 2023-11-17 NOTE — PROGRESS NOTES
Routine Prenatal Visit     Subjective  Patsy Motley is a 33 y.o.  at 25w6d here for her routine OB visit.   She is taking her prenatal vitamins.Reports no loss of fluid or vaginal bleeding. Patient doing well without any complaints. Pregnancy is complicated by:     Patient Active Problem List   Diagnosis    Supervision of other normal pregnancy, antepartum    History of gestational diabetes in prior pregnancy, currently pregnant    Maternal anemia in pregnancy, antepartum         OB History    Para Term  AB Living   6 4 4   1 4   SAB IAB Ectopic Molar Multiple Live Births   1       0 4      # Outcome Date GA Lbr Tyrone/2nd Weight Sex Delivery Anes PTL Lv   6 Current            5 Term 22 39w5d 01:27 / 00:16 3354 g (7 lb 6.3 oz) F Vag-Spont EPI N AMARJIT      Birth Comments: scale 4   4 Term 11 39w0d  2807 g (6 lb 3 oz) F Vag-Spont EPI N AMARJIT   3 Term 05/22/10 39w0d  2892 g (6 lb 6 oz) F Vag-Spont  N AMARJIT   2 Term 09 40w0d  3402 g (7 lb 8 oz) M Vag-Spont EPI N AMARJIT   1 SAB  14w0d    SAB          ROS:   General ROS: negative for - chills or fatigue  Respiratory ROS: negative for - cough or hemoptysis  Cardiovascular ROS: negative for - chest pain or dyspnea on exertion  Genito-Urinary ROS: negative for  change in urinary stream, vaginal discharge   Musculoskeletal ROS: negative for - gait disturbance or joint pain  Dermatological ROS: negative for acne,  dry skin or itching    Objective  Physical Exam:   Vitals:    23 1141   BP: 120/78       Uterine Size: size equals dates  FHT: 110-160 BPM    General appearance - alert, well appearing, and in no distress  Mental status - alert, oriented to person, place, and time  Abdomen- Soft, Gravid uterus, non-tender to palpation  Musculoskeletal: negative for - gait disturbance or joint pain  Extremeties: negative swelling or cyanosis   Dermatological: negative rashes or skin lesions       Assessment/Plan:   Diagnoses and all orders  for this visit:    1. Supervision of other normal pregnancy, antepartum (Primary)  Assessment & Plan:  EVERETT finalized: 2/27/24 per LMP, 11-week US and ACOG     Genetic testing (NIPS-Quad)/CF/AFP:  CF negative, NIPS negative     COVID: Recommended 8/3/23  Flu: Recommended 8/3/23  Tdap:     Rhogam: Opos  ? Desires Sterilization:     Anatomy US: EFW 46.8%, AC 50%, MVP 5.57, transverse presentation, anterior grade 1 placenta, normal anatomy and cardiac survey, female,  bpm  FU US:     PROBLEM LIST/PLAN:   Hx of GDM - early 1hGTT       Orders:  -     POC Urinalysis Dipstick    2. History of gestational diabetes in prior pregnancy, currently pregnant  Assessment & Plan:  8/31/2023 Early 1 hour 199. Does not meet diagnostic criteria for diabetes; HgbA1C ordered. Continue with FSBS 4 times a day. Send values in 2 weeks to determine if medication required or not.   9/1/2023 HgbA1C 4.90   9/26/2023 fastings within normal limits.  Normal 2 hours after breakfast.  2 elevations in postprandial after lunch, 1 elevation after dinner.  Continue with dietary changes.  Discussed with patient holding fingerstick blood sugars until reassessment at 24 weeks versus continue fingerstick blood sugars.  Patient okay with continued fingerstick blood sugars.  10/24/2023 she has not been checking blood sugars due to lost BG meter.  POC glucose 82.  Recommend diagnostic 3-hour GTT.  >>missed appt, will get re-scheduled       Orders:  -     glucose monitor monitoring kit; Check BS FOUR times per day (fasting and 2hrs after meals) and record.  Bring blood sugar record to next office visit.  Dispense: 1 each; Refill: 0  -     glucose blood test strip; Check BS 4x/day as instructed.  Dispense: 120 each; Refill: 1  -     Lancets misc; Use 120 each 4 (Four) Times a Day. Check blood sugars four times daily as directed  Dispense: 120 each; Refill: 3  -     Alcohol Swabs pads; Use to clean fingers before checking BS 4 times per day and PRN   Dispense: 120 each; Refill: 3    3. Maternal anemia in pregnancy, antepartum        Counseling:   OB precautions, leaking, VB, maynor landon vs PTL/Labor  [facility]  GESTATIONAL DIABETES discussed.  Importance of strict BS control (goals <95F & <120 2hr PP).  Record and bring all BS to qOV (provided log).  Risks of inadequate control NLT macrosomia, shoulder dystocia +/- perm nn damage/fractures/O2 deprivation/brain damage, maternal lacs/dyspareunia, incontinence, prolapse, CS, hemorrhage/transfusion, childhood obesity/DM.  Appropriate diet choices, consistency with eating/sleep schedules, & exercising recommended.  Lifetime risk DM and need for PP and yearly glucose checks.  Call [facility] and ask for office clinical nurse/manager if >25% of BS out of normal range before next OV to discuss treatment.  Round Ligament Pain:  The uterus has several ligaments which provide support and keep the uterus in place. As the  uterus grows these ligaments are pulled and stretched which often causes sharp stabbing like pain in the inguinal area.   You may find a pregnancy support band helpful. Changing positions may also help. Yoga is a great way to cope with round ligament and low back pain in pregnancy.    Massage may also help with low back pain   Things to Consider at this Point in your Pregnancy:  Some women experience swelling in their feet during pregnancy. Compression stockings may help  Drink plenty of water and stay active   Make sure you are eating frequent small meals, nuts are a wonderful snack to keep with you            Return in about 4 weeks (around 12/18/2023) for Routine OB visit.      We have gone over prenatal care to include the timing and content of visits. I informed her how to contact the office and/or on call person in the event of any problems and encouraged her to do so when she feels it is necessary.  We then spent time answering her questions which she indicated were answered to her satisfaction.    Mindy  DO Sandra  11/20/2023 14:12 EST

## 2023-11-20 ENCOUNTER — ROUTINE PRENATAL (OUTPATIENT)
Dept: OBSTETRICS AND GYNECOLOGY | Facility: CLINIC | Age: 33
End: 2023-11-20
Payer: MEDICAID

## 2023-11-20 VITALS — BODY MASS INDEX: 25.13 KG/M2 | SYSTOLIC BLOOD PRESSURE: 120 MMHG | WEIGHT: 133 LBS | DIASTOLIC BLOOD PRESSURE: 78 MMHG

## 2023-11-20 DIAGNOSIS — O99.019 MATERNAL ANEMIA IN PREGNANCY, ANTEPARTUM: ICD-10-CM

## 2023-11-20 DIAGNOSIS — O09.299 HISTORY OF GESTATIONAL DIABETES IN PRIOR PREGNANCY, CURRENTLY PREGNANT: ICD-10-CM

## 2023-11-20 DIAGNOSIS — Z86.32 HISTORY OF GESTATIONAL DIABETES IN PRIOR PREGNANCY, CURRENTLY PREGNANT: ICD-10-CM

## 2023-11-20 DIAGNOSIS — Z34.80 SUPERVISION OF OTHER NORMAL PREGNANCY, ANTEPARTUM: Primary | ICD-10-CM

## 2023-11-20 LAB
GLUCOSE UR STRIP-MCNC: NEGATIVE MG/DL
PROT UR STRIP-MCNC: NEGATIVE MG/DL

## 2023-11-20 RX ORDER — BLOOD-GLUCOSE METER
KIT MISCELLANEOUS
Qty: 1 EACH | Refills: 0 | Status: SHIPPED | OUTPATIENT
Start: 2023-11-20

## 2023-11-20 RX ORDER — LANCETS 30 GAUGE
120 EACH MISCELLANEOUS 4 TIMES DAILY
Qty: 120 EACH | Refills: 3 | Status: SHIPPED | OUTPATIENT
Start: 2023-11-20

## 2023-11-20 RX ORDER — BLOOD PRESSURE TEST KIT
KIT MISCELLANEOUS
Qty: 120 EACH | Refills: 3 | Status: SHIPPED | OUTPATIENT
Start: 2023-11-20

## 2023-11-20 NOTE — ASSESSMENT & PLAN NOTE
8/31/2023 Early 1 hour 199. Does not meet diagnostic criteria for diabetes; HgbA1C ordered. Continue with FSBS 4 times a day. Send values in 2 weeks to determine if medication required or not.   9/1/2023 HgbA1C 4.90   9/26/2023 fastings within normal limits.  Normal 2 hours after breakfast.  2 elevations in postprandial after lunch, 1 elevation after dinner.  Continue with dietary changes.  Discussed with patient holding fingerstick blood sugars until reassessment at 24 weeks versus continue fingerstick blood sugars.  Patient okay with continued fingerstick blood sugars.  10/24/2023 she has not been checking blood sugars due to lost BG meter.  POC glucose 82.  Recommend diagnostic 3-hour GTT.  >>missed appt, will get re-scheduled

## 2023-11-25 ENCOUNTER — TELEPHONE (OUTPATIENT)
Dept: OBSTETRICS AND GYNECOLOGY | Facility: CLINIC | Age: 33
End: 2023-11-25
Payer: MEDICAID

## 2023-11-25 DIAGNOSIS — Z34.80 SUPERVISION OF OTHER NORMAL PREGNANCY, ANTEPARTUM: ICD-10-CM

## 2023-11-25 DIAGNOSIS — Z34.80 SUPERVISION OF OTHER NORMAL PREGNANCY, ANTEPARTUM: Primary | ICD-10-CM

## 2023-11-25 RX ORDER — FAMOTIDINE 20 MG/1
20 TABLET, FILM COATED ORAL 2 TIMES DAILY PRN
Qty: 60 TABLET | Refills: 1 | Status: SHIPPED | OUTPATIENT
Start: 2023-11-25 | End: 2023-11-27

## 2023-11-25 RX ORDER — DOCUSATE SODIUM 100 MG/1
100 CAPSULE, LIQUID FILLED ORAL 2 TIMES DAILY
Qty: 60 CAPSULE | Refills: 1 | Status: SHIPPED | OUTPATIENT
Start: 2023-11-25

## 2023-11-25 NOTE — TELEPHONE ENCOUNTER
Patsy Motley is a 33 y.o.  calling after hours line for concerns for feeling dehydrated and abdominal cramping. Patient denies fevers, chills or vomiting. Feeling nauseated which she attributes to heartburn. Tolerating oral intake. Reports good fetal movement, no vaginal bleeding. Feels she is having irregular cramping which she attributes to [place]. Denies any burning with urination. Feels that cramping is more on left side. Reports intercourse 4 days ago. Taking iron supplementation and having some constipation. Recommendation for increased oral intake with water and gatorade. Pepcid to pharmacy for GERD. Colace to pharmacy for constipation. Pelvic rest recommended. Labor and infection precautions. If no improvement in symptoms, recommendation for evaluation in triage.     Yogesh Sutherland MD

## 2023-11-27 RX ORDER — FAMOTIDINE 20 MG/1
TABLET, FILM COATED ORAL
Qty: 180 TABLET | Refills: 1 | Status: SHIPPED | OUTPATIENT
Start: 2023-11-27

## 2023-11-27 NOTE — TELEPHONE ENCOUNTER
[facility] 90 day supply on Pepcid per protocol.  Last seen 11/20/23.  Last filled 11/25/23 Pepcid # 30 with 1 refill.  Next appointment 12/18/23

## 2023-11-27 NOTE — TELEPHONE ENCOUNTER
Patient missed her three hr gtt at [facility]. Due to availability at [facility] and the patient needing to get the test done ASAP. [facility] Order faxed.

## 2023-11-28 ENCOUNTER — TELEPHONE (OUTPATIENT)
Dept: OBSTETRICS AND GYNECOLOGY | Facility: CLINIC | Age: 33
End: 2023-11-28
Payer: MEDICAID

## 2023-11-28 NOTE — TELEPHONE ENCOUNTER
Patient went to [facility] lab this AM as she was under the impression that is where she was getting the three hr gtt done. [facility] then went to the [facility] but was unable to locate the lab. She is going to attempt to go back one day this week.

## 2023-11-29 ENCOUNTER — TELEPHONE (OUTPATIENT)
Dept: OBSTETRICS AND GYNECOLOGY | Facility: CLINIC | Age: 33
End: 2023-11-29

## 2023-11-29 NOTE — TELEPHONE ENCOUNTER
Caller: Patsy Motley    Relationship: Self    Best call back number: [phone]    What orders are you requesting (i.e. lab or imaging): 3 HOUR GLUCOSE    In what timeframe would the patient need to come in: ASAP (PT THERE NOW, HAS UNTIL 9AM FOR THE ORDER TO BE RECEIVED)    Where will you receive your lab/imaging services: [facility]  FAX #: [phone]

## 2023-12-04 ENCOUNTER — TELEPHONE (OUTPATIENT)
Dept: OBSTETRICS AND GYNECOLOGY | Facility: CLINIC | Age: 33
End: 2023-12-04
Payer: MEDICAID

## 2023-12-04 NOTE — TELEPHONE ENCOUNTER
----- Message from Mindy Flores DO sent at 12/1/2023  8:49 AM EST -----  Failed 3hr gtt, please let patient know to continue 4x daily BG monitoring and continue to monitor diet

## 2023-12-05 NOTE — TELEPHONE ENCOUNTER
Spoke with pt. Advised to check BS four time daily, fasting and two hours after each meal. Advised to log each reading and bring to her office visits.

## 2023-12-15 NOTE — PROGRESS NOTES
Routine Prenatal Visit     Subjective  Patsy Motley is a 33 y.o.  at 29w6d here for her routine OB visit.   She is taking her prenatal vitamins.Reports no loss of fluid or vaginal bleeding. Patient doing well without any complaints. Pregnancy is complicated by:     Patient Active Problem List   Diagnosis    Supervision of other normal pregnancy, antepartum    History of gestational diabetes in prior pregnancy, currently pregnant    Maternal anemia in pregnancy, antepartum    Diet controlled gestational diabetes mellitus (GDM), antepartum         OB History    Para Term  AB Living   6 4 4   1 4   SAB IAB Ectopic Molar Multiple Live Births   1       0 4      # Outcome Date GA Lbr Tyrone/2nd Weight Sex Delivery Anes PTL Lv   6 Current            5 Term 22 39w5d 01:27  00:16 3354 g (7 lb 6.3 oz) F Vag-Spont EPI N AMARJIT      Birth Comments: scale 4   4 Term 11 39w0d  2807 g (6 lb 3 oz) F Vag-Spont EPI N AMARJIT   3 Term 05/22/10 39w0d  2892 g (6 lb 6 oz) F Vag-Spont  N AMARJIT   2 Term 09 40w0d  3402 g (7 lb 8 oz) M Vag-Spont EPI N AMARJIT   1 2007 14w0d    SAB              ROS:   General ROS: negative for - chills or fatigue  Respiratory ROS: negative for - cough or hemoptysis  Cardiovascular ROS: negative for - chest pain or dyspnea on exertion  Genito-Urinary ROS: negative for  change in urinary stream, vaginal discharge   Musculoskeletal ROS: negative for - gait disturbance or joint pain  Dermatological ROS: negative for acne,  dry skin or itching    Objective  Physical Exam:   Vitals:    23 1308   BP: 117/80       Uterine Size: size equals dates  FHT: 110-160 BPM    General appearance - alert, well appearing, and in no distress  Mental status - alert, oriented to person, place, and time  Abdomen- Soft, Gravid uterus, non-tender to palpation  Musculoskeletal: negative for - gait disturbance or joint pain  Extremeties: negative swelling or cyanosis   Dermatological: negative  rashes or skin lesions       Assessment/Plan:   Diagnoses and all orders for this visit:    1. Supervision of other normal pregnancy, antepartum (Primary)  Assessment & Plan:  EVERETT finalized: 2/27/24 per LMP, 11-week US and ACOG    Genetic testing (NIPS-Quad)/CF/AFP:  CF negative, NIPS negative    COVID: Recommended 8/3/23  Flu: Recommended 8/3/23  Tdap:    Rhogam: Opos  ? Desires Sterilization:    Anatomy US: EFW 46.8%, AC 50%, MVP 5.57, transverse presentation, anterior grade 1 placenta, normal anatomy and cardiac survey, female,  bpm  FU US:    PROBLEM LIST/PLAN:   GDMA1    Orders:  -     POC Urinalysis Dipstick  -     docusate sodium (Colace) 100 MG capsule; Take 1 capsule by mouth 2 (Two) Times a Day As Needed for Constipation.  Dispense: 60 capsule; Refill: 1  -     famotidine (PEPCID) 20 MG tablet; Take 1 tablet by mouth 2 (Two) Times a Day.  Dispense: 60 tablet; Refill: 1    2. Maternal anemia in pregnancy, antepartum  -     ferrous sulfate 325 (65 FE) MG tablet; Take 1 tablet by mouth Every Other Day for 150 days.  Dispense: 15 tablet; Refill: 4    3. Diet controlled gestational diabetes mellitus (GDM), antepartum  Assessment & Plan:  Reviewed BG logs today- all wnl            Counseling:   OB precautions, leaking, VB, maynor landon vs PTL/Labor  [facility]  GESTATIONAL DIABETES discussed.  Importance of strict BS control (goals <95F & <120 2hr PP).  Record and bring all BS to qOV (provided log).  Risks of inadequate control NLT macrosomia, shoulder dystocia +/- perm nn damage/fractures/O2 deprivation/brain damage, maternal lacs/dyspareunia, incontinence, prolapse, CS, hemorrhage/transfusion, childhood obesity/DM.  Appropriate diet choices, consistency with eating/sleep schedules, & exercising recommended.  Lifetime risk DM and need for PP and yearly glucose checks.  Call [facility] and ask for office clinical nurse/manager if >25% of BS out of normal range before next OV to discuss treatment.  Round Ligament  Pain:  The uterus has several ligaments which provide support and keep the uterus in place. As the  uterus grows these ligaments are pulled and stretched which often causes sharp stabbing like pain in the inguinal area.   You may find a pregnancy support band helpful. Changing positions may also help. Yoga is a great way to cope with round ligament and low back pain in pregnancy.    Massage may also help with low back pain   Things to Consider at this Point in your Pregnancy:  Some women experience swelling in their feet during pregnancy. Compression stockings may help  Drink plenty of water and stay active   Make sure you are eating frequent small meals, nuts are a wonderful snack to keep with you            Return in about 4 weeks (around 1/15/2024) for Routine OB visit.      We have gone over prenatal care to include the timing and content of visits. I informed her how to contact the office and/or on call person in the event of any problems and encouraged her to do so when she feels it is necessary.  We then spent time answering her questions which she indicated were answered to her satisfaction.    Mindy Flores,   12/18/2023 13:24 EST

## 2023-12-18 ENCOUNTER — ROUTINE PRENATAL (OUTPATIENT)
Dept: OBSTETRICS AND GYNECOLOGY | Facility: CLINIC | Age: 33
End: 2023-12-18
Payer: MEDICAID

## 2023-12-18 VITALS — DIASTOLIC BLOOD PRESSURE: 80 MMHG | WEIGHT: 137.2 LBS | SYSTOLIC BLOOD PRESSURE: 117 MMHG | BODY MASS INDEX: 25.92 KG/M2

## 2023-12-18 DIAGNOSIS — Z34.80 SUPERVISION OF OTHER NORMAL PREGNANCY, ANTEPARTUM: Primary | ICD-10-CM

## 2023-12-18 DIAGNOSIS — O99.019 MATERNAL ANEMIA IN PREGNANCY, ANTEPARTUM: ICD-10-CM

## 2023-12-18 DIAGNOSIS — Z34.80 SUPERVISION OF OTHER NORMAL PREGNANCY, ANTEPARTUM: ICD-10-CM

## 2023-12-18 DIAGNOSIS — O24.410 DIET CONTROLLED GESTATIONAL DIABETES MELLITUS (GDM), ANTEPARTUM: ICD-10-CM

## 2023-12-18 LAB
GLUCOSE UR STRIP-MCNC: NEGATIVE MG/DL
PROT UR STRIP-MCNC: NEGATIVE MG/DL

## 2023-12-18 RX ORDER — FAMOTIDINE 20 MG/1
20 TABLET, FILM COATED ORAL 2 TIMES DAILY
Qty: 60 TABLET | Refills: 1 | Status: SHIPPED | OUTPATIENT
Start: 2023-12-18

## 2023-12-18 RX ORDER — FERROUS SULFATE 325(65) MG
325 TABLET ORAL EVERY OTHER DAY
Qty: 15 TABLET | Refills: 4 | Status: SHIPPED | OUTPATIENT
Start: 2023-12-18 | End: 2024-05-16

## 2023-12-18 RX ORDER — FAMOTIDINE 20 MG/1
20 TABLET, FILM COATED ORAL 2 TIMES DAILY
Qty: 180 TABLET | OUTPATIENT
Start: 2023-12-18

## 2023-12-18 RX ORDER — DOCUSATE SODIUM 100 MG/1
100 CAPSULE, LIQUID FILLED ORAL 2 TIMES DAILY PRN
Qty: 60 CAPSULE | Refills: 1 | Status: SHIPPED | OUTPATIENT
Start: 2023-12-18

## 2023-12-18 NOTE — ASSESSMENT & PLAN NOTE
EVERETT finalized: 2/27/24 per LMP, 11-week US and ACOG    Genetic testing (NIPS-Quad)/CF/AFP:  CF negative, NIPS negative    COVID: Recommended 8/3/23  Flu: Recommended 8/3/23  Tdap:    Rhogam: Opos  ? Desires Sterilization:    Anatomy US: EFW 46.8%, AC 50%, MVP 5.57, transverse presentation, anterior grade 1 placenta, normal anatomy and cardiac survey, female,  bpm  FU US:    PROBLEM LIST/PLAN:   GDMA1

## 2024-01-10 NOTE — PROGRESS NOTES
Routine Prenatal Visit     Subjective  Patsy Motley is a 33 y.o.  at 33w6d here for her routine OB visit.   She is taking her prenatal vitamins.Reports no loss of fluid or vaginal bleeding. Patient doing well without any complaints. Pregnancy complicated by:     Patient Active Problem List   Diagnosis    Supervision of other normal pregnancy, antepartum    History of gestational diabetes in prior pregnancy, currently pregnant    Maternal anemia in pregnancy, antepartum    Diet controlled gestational diabetes mellitus (GDM), antepartum         OB History    Para Term  AB Living   6 4 4   1 4   SAB IAB Ectopic Molar Multiple Live Births   1       0 4      # Outcome Date GA Lbr Tyrone/2nd Weight Sex Delivery Anes PTL Lv   6 Current            5 Term 22 39w5d 01:27 / 00:16 3354 g (7 lb 6.3 oz) F Vag-Spont EPI N AMARJIT      Birth Comments: scale 4   4 Term 11 39w0d  2807 g (6 lb 3 oz) F Vag-Spont EPI N AMARJIT   3 Term 05/22/10 39w0d  2892 g (6 lb 6 oz) F Vag-Spont  N AMARJIT   2 Term 09 40w0d  3402 g (7 lb 8 oz) M Vag-Spont EPI N AMARJIT   1 2007 14w0d    SAB              ROS:   General ROS: negative for - chills or fatigue  Respiratory ROS: negative for - cough or hemoptysis  Cardiovascular ROS: negative for - chest pain or dyspnea on exertion  Genito-Urinary ROS: negative for  change in urinary stream, vaginal discharge   Musculoskeletal ROS: negative for - gait disturbance or joint pain  Dermatological ROS: negative for acne,  dry skin or itching    Objective  Physical Exam:   Vitals:    01/15/24 1310   BP: 104/62       Uterine Size: size equals dates  FHT: 110-160 BPM    General appearance - alert, well appearing, and in no distress  Mental status - alert, oriented to person, place, and time  Abdomen- Soft, Gravid uterus, non-tender to palpation  Musculoskeletal: negative for - gait disturbance or joint pain  Extremeties: negative swelling or cyanosis   Dermatological: negative rashes  or skin lesions       Assessment/Plan:   Diagnoses and all orders for this visit:    1. Supervision of other normal pregnancy, antepartum (Primary)  -     POC Urinalysis Dipstick  -     US Ob 14 + Weeks Single or First Gestation; Future    2. Diet controlled gestational diabetes mellitus (GDM), antepartum    3. Maternal anemia in pregnancy, antepartum      Reviewed BG logs, all within normal  Growth US ordered    Counseling:   OB precautions, leaking, VB, maynor landon vs PTL/Labor  [facility]  GESTATIONAL DIABETES discussed.  Importance of strict BS control (goals <95F & <120 2hr PP).  Record and bring all BS to qOV (provided log).  Risks of inadequate control NLT macrosomia, shoulder dystocia +/- perm nn damage/fractures/O2 deprivation/brain damage, maternal lacs/dyspareunia, incontinence, prolapse, CS, hemorrhage/transfusion, childhood obesity/DM.  Appropriate diet choices, consistency with eating/sleep schedules, & exercising recommended.  Lifetime risk DM and need for PP and yearly glucose checks.  Call [facility] and ask for office clinical nurse/manager if >25% of BS out of normal range before next OV to discuss treatment.  Round Ligament Pain:  The uterus has several ligaments which provide support and keep the uterus in place. As the  uterus grows these ligaments are pulled and stretched which often causes sharp stabbing like pain in the inguinal area.   You may find a pregnancy support band helpful. Changing positions may also help. Yoga is a great way to cope with round ligament and low back pain in pregnancy.    Massage may also help with low back pain   Things to Consider at this Point in your Pregnancy:  Some women experience swelling in their feet during pregnancy. Compression stockings may help  Drink plenty of water and stay active   Make sure you are eating frequent small meals, nuts are a wonderful snack to keep with you            Return in about 2 weeks (around 1/29/2024) for Routine OB visit.      We have  gone over prenatal care to include the timing and content of visits. I informed her how to contact the office and/or on call person in the event of any problems and encouraged her to do so when she feels it is necessary.  We then spent time answering her questions which she indicated were answered to her satisfaction.    Mindy Flores DO  1/15/2024 13:29 EST

## 2024-01-15 ENCOUNTER — ROUTINE PRENATAL (OUTPATIENT)
Dept: OBSTETRICS AND GYNECOLOGY | Facility: CLINIC | Age: 34
End: 2024-01-15
Payer: MEDICAID

## 2024-01-15 VITALS — WEIGHT: 140 LBS | BODY MASS INDEX: 26.45 KG/M2 | DIASTOLIC BLOOD PRESSURE: 62 MMHG | SYSTOLIC BLOOD PRESSURE: 104 MMHG

## 2024-01-15 DIAGNOSIS — O24.410 DIET CONTROLLED GESTATIONAL DIABETES MELLITUS (GDM), ANTEPARTUM: ICD-10-CM

## 2024-01-15 DIAGNOSIS — O99.019 MATERNAL ANEMIA IN PREGNANCY, ANTEPARTUM: ICD-10-CM

## 2024-01-15 DIAGNOSIS — Z34.80 SUPERVISION OF OTHER NORMAL PREGNANCY, ANTEPARTUM: Primary | ICD-10-CM

## 2024-01-15 LAB
GLUCOSE UR STRIP-MCNC: NEGATIVE MG/DL
PROT UR STRIP-MCNC: NEGATIVE MG/DL

## 2024-01-15 PROCEDURE — 99213 OFFICE O/P EST LOW 20 MIN: CPT | Performed by: STUDENT IN AN ORGANIZED HEALTH CARE EDUCATION/TRAINING PROGRAM

## 2024-01-29 ENCOUNTER — ROUTINE PRENATAL (OUTPATIENT)
Dept: OBSTETRICS AND GYNECOLOGY | Facility: CLINIC | Age: 34
End: 2024-01-29
Payer: MEDICAID

## 2024-01-29 VITALS — WEIGHT: 139 LBS | BODY MASS INDEX: 26.26 KG/M2 | SYSTOLIC BLOOD PRESSURE: 114 MMHG | DIASTOLIC BLOOD PRESSURE: 76 MMHG

## 2024-01-29 DIAGNOSIS — O24.410 DIET CONTROLLED GESTATIONAL DIABETES MELLITUS (GDM), ANTEPARTUM: ICD-10-CM

## 2024-01-29 DIAGNOSIS — Z34.80 SUPERVISION OF OTHER NORMAL PREGNANCY, ANTEPARTUM: Primary | ICD-10-CM

## 2024-01-29 DIAGNOSIS — O99.019 MATERNAL ANEMIA IN PREGNANCY, ANTEPARTUM: ICD-10-CM

## 2024-01-29 LAB
DEPRECATED RDW RBC AUTO: 39.6 FL (ref 37–54)
ERYTHROCYTE [DISTWIDTH] IN BLOOD BY AUTOMATED COUNT: 12.6 % (ref 12.3–15.4)
GLUCOSE UR STRIP-MCNC: NEGATIVE MG/DL
HCT VFR BLD AUTO: 28.1 % (ref 34–46.6)
HGB BLD-MCNC: 9.8 G/DL (ref 12–15.9)
MCH RBC QN AUTO: 30.3 PG (ref 26.6–33)
MCHC RBC AUTO-ENTMCNC: 34.9 G/DL (ref 31.5–35.7)
MCV RBC AUTO: 87 FL (ref 79–97)
PLATELET # BLD AUTO: 188 10*3/MM3 (ref 140–450)
PMV BLD AUTO: 9.5 FL (ref 6–12)
PROT UR STRIP-MCNC: NEGATIVE MG/DL
RBC # BLD AUTO: 3.23 10*6/MM3 (ref 3.77–5.28)
WBC NRBC COR # BLD AUTO: 5.82 10*3/MM3 (ref 3.4–10.8)

## 2024-01-29 PROCEDURE — 85027 COMPLETE CBC AUTOMATED: CPT | Performed by: OBSTETRICS & GYNECOLOGY

## 2024-01-29 PROCEDURE — 87653 STREP B DNA AMP PROBE: CPT | Performed by: OBSTETRICS & GYNECOLOGY

## 2024-01-29 PROCEDURE — 99213 OFFICE O/P EST LOW 20 MIN: CPT | Performed by: OBSTETRICS & GYNECOLOGY

## 2024-01-29 NOTE — PATIENT INSTRUCTIONS
Venipuncture Blood Specimen Collection  Venipuncture performed in left arm by Ludivina Akins with good hemostasis. Patient tolerated the procedure well without complications.   01/29/24   Ludivina Akins     Hyperlipidemia:  Patient is here to follow up regarding chronic hyperlipidemia. This is not generally controlled. Treatment includes no medication as she stopped Crestor 2 years ago due to memory impairment. Patient is  compliant with lifestyle modifications. Patient is not a smoker. Most recent labs reviewed with patient today and are remarkable. Comorbid conditions include overweight. The 10-year ASCVD risk score (Forest Conroy et al., 2013) is: 11.9%    Values used to calculate the score:      Age: 69 years      Sex: Female      Is Non- : No      Diabetic: No      Tobacco smoker: No      Systolic Blood Pressure: 831 mmHg      Is BP treated: No      HDL Cholesterol: 50 mg/dL      Total Cholesterol: 248 mg/dL      Lab Results   Component Value Date    LDLCALC 179 (H) 08/02/2018       Patient's past medical, surgical, social and/or family history reviewed, updated in chart, and are non-contributory (unless otherwise stated). Medications and allergies also reviewed and updated in chart.       Review of Systems:  Constitutional:  No fever, no fatigue, no chills, no headaches, no weight change  Dermatology:  No rash, no mole, no dry or sensitive skin  ENT:  No cough, no sore throat, no sinus pain, no runny nose, no ear pain  Cardiology:  No chest pain, no palpitations, no leg edema, no shortness of breath, no PND  Gastroenterology:  No dysphagia, no abdominal pain, no nausea, no vomiting, no constipation, no diarrhea, no heartburn  Musculoskeletal:  No joint pain, no leg cramps, no back pain, no muscle aches  Respiratory:  No shortness of breath, no orthopnea, no wheezing, no MCKENZIE, no hemoptysis  Urology:  No blood in the urine, no urinary frequency, no urinary incontinence, no urinary urgency, no nocturia, no dysuria    Vitals:    08/08/18 1507 08/08/18 1510 08/08/18 1535   BP: (!) 145/83 (!) 151/79 132/80   Pulse: 74     Resp: 18     Temp: 98.7 °F (37.1 °C)     TempSrc: Oral screening exams and vaccinations. Advised patient regarding importance of keeping up with recommended health maintenance and to schedule as soon as possible if overdue, as this is important in assessing for undiagnosed pathology, especially cancer, as well as protecting against potentially harmful/life threatening disease. Patient and/or guardian verbalizes understanding and agrees with above counseling, assessment and plan. All questions answered.

## 2024-01-29 NOTE — PROGRESS NOTES
Routine Prenatal Visit     Subjective  Patsy Motley is a 33 y.o.  at 35w6d here for her routine OB visit.   She is taking her prenatal vitamins.Reports no loss of fluid or vaginal bleeding. Patient doing well without any complaints. Pregnancy complicated by:     Patient Active Problem List   Diagnosis    Supervision of other normal pregnancy, antepartum    History of gestational diabetes in prior pregnancy, currently pregnant    Maternal anemia in pregnancy, antepartum    Diet controlled gestational diabetes mellitus (GDM), antepartum         OB History    Para Term  AB Living   6 4 4   1 4   SAB IAB Ectopic Molar Multiple Live Births   1       0 4      # Outcome Date GA Lbr Tyrone/2nd Weight Sex Delivery Anes PTL Lv   6 Current            5 Term 22 39w5d 01:27 / 00:16 3354 g (7 lb 6.3 oz) F Vag-Spont EPI N AMARJIT      Birth Comments: scale 4   4 Term 11 39w0d  2807 g (6 lb 3 oz) F Vag-Spont EPI N AMARJIT   3 Term 05/22/10 39w0d  2892 g (6 lb 6 oz) F Vag-Spont  N AMARJIT   2 Term 09 40w0d  3402 g (7 lb 8 oz) M Vag-Spont EPI N AMARJIT   1 2007 14w0d    SAB              ROS:   General ROS: negative for - chills or fatigue  Respiratory ROS: negative for - cough or hemoptysis  Cardiovascular ROS: negative for - chest pain or dyspnea on exertion  Genito-Urinary ROS: negative for  change in urinary stream, vaginal discharge   Musculoskeletal ROS: negative for - gait disturbance or joint pain  Dermatological ROS: negative for acne,  dry skin or itching    Objective  Physical Exam:   Vitals:    24 1511   BP: 114/76       Uterine Size: not examined, US today  FHT: 110-160 BPM    General appearance - alert, well appearing, and in no distress  Mental status - alert, oriented to person, place, and time  Abdomen- Soft, Gravid uterus, non-tender to palpation  Musculoskeletal: negative for - gait disturbance or joint pain  Extremeties: negative swelling or cyanosis   Dermatological: negative  rashes or skin lesions   GBS RV swab performed today    Assessment/Plan:   Diagnoses and all orders for this visit:    1. Supervision of other normal pregnancy, antepartum (Primary)  Assessment & Plan:  PROBLEM LIST/PLAN:   GDMA1  Anemia-iron supplementation     Orders:  -     POC Urinalysis Dipstick  -     CBC (No Diff)  -     Group B Strep (Molecular) - Swab, Vaginal/Rectum    2. Diet controlled gestational diabetes mellitus (GDM), antepartum  Assessment & Plan:  Failed 3hr gtt  Monitor BG 4x daily  Bring logs to every appt-Logs reviewed 1/29/24 and WNL    Orders:  -     Fetal Nonstress Test; Standing    3. Maternal anemia in pregnancy, antepartum            Counseling:   OB precautions, leaking, VB, maynor landon vs PTL/Labor  FKC  HTN precautions reviewed: HA, vision change, RUQ/epigastric pain, edema  Round Ligament Pain:  The uterus has several ligaments which provide support and keep the uterus in place. As the  uterus grows these ligaments are pulled and stretched which often causes sharp stabbing like pain in the inguinal area.   You may find a pregnancy support band helpful. Changing positions may also help. Yoga is a great way to cope with round ligament and low back pain in pregnancy.    Massage may also help with low back pain   Things to Consider at this Point in your Pregnancy:  Some women experience swelling in their feet during pregnancy. Compression stockings may help  Drink plenty of water and stay active   Make sure you are eating frequent small meals, nuts are a wonderful snack to keep with you            Return in about 1 week (around 2/5/2024) for Routine OB visit.      We have gone over prenatal care to include the timing and content of visits. I informed her how to contact the office and/or on call person in the event of any problems and encouraged her to do so when she feels it is necessary.  We then spent time answering her questions which she indicated were answered to her  satisfaction.    Shirley Gonzalez DO  1/29/2024 15:20 EST

## 2024-01-30 DIAGNOSIS — O99.019 MATERNAL ANEMIA IN PREGNANCY, ANTEPARTUM: Primary | ICD-10-CM

## 2024-01-30 LAB — GROUP B STREP, DNA: POSITIVE

## 2024-01-30 RX ORDER — SODIUM CHLORIDE 9 MG/ML
20 INJECTION, SOLUTION INTRAVENOUS ONCE
OUTPATIENT
Start: 2024-02-06

## 2024-01-30 RX ORDER — ACETAMINOPHEN 325 MG/1
650 TABLET ORAL ONCE
OUTPATIENT
Start: 2024-02-06

## 2024-01-31 ENCOUNTER — LAB (OUTPATIENT)
Dept: LAB | Facility: HOSPITAL | Age: 34
End: 2024-01-31
Payer: MEDICAID

## 2024-01-31 LAB
IRON 24H UR-MRATE: 87 MCG/DL (ref 37–145)
IRON SATN MFR SERPL: 13 % (ref 20–50)
TIBC SERPL-MCNC: 651 MCG/DL (ref 298–536)
TRANSFERRIN SERPL-MCNC: 437 MG/DL (ref 200–360)

## 2024-01-31 PROCEDURE — 83540 ASSAY OF IRON: CPT | Performed by: OBSTETRICS & GYNECOLOGY

## 2024-01-31 PROCEDURE — 84466 ASSAY OF TRANSFERRIN: CPT | Performed by: OBSTETRICS & GYNECOLOGY

## 2024-01-31 PROCEDURE — 36415 COLL VENOUS BLD VENIPUNCTURE: CPT | Performed by: OBSTETRICS & GYNECOLOGY

## 2024-02-06 ENCOUNTER — TELEPHONE (OUTPATIENT)
Dept: OBSTETRICS AND GYNECOLOGY | Facility: CLINIC | Age: 34
End: 2024-02-06

## 2024-02-07 ENCOUNTER — ROUTINE PRENATAL (OUTPATIENT)
Dept: OBSTETRICS AND GYNECOLOGY | Facility: CLINIC | Age: 34
End: 2024-02-07
Payer: MEDICAID

## 2024-02-07 ENCOUNTER — HOSPITAL ENCOUNTER (OUTPATIENT)
Dept: INFUSION THERAPY | Facility: HOSPITAL | Age: 34
Discharge: HOME | End: 2024-02-07
Admitting: OBSTETRICS & GYNECOLOGY
Payer: MEDICAID

## 2024-02-07 ENCOUNTER — PATIENT OUTREACH (OUTPATIENT)
Dept: LABOR AND DELIVERY | Facility: HOSPITAL | Age: 34
End: 2024-02-07
Payer: MEDICAID

## 2024-02-07 VITALS
WEIGHT: 138.45 LBS | HEART RATE: 92 BPM | SYSTOLIC BLOOD PRESSURE: 125 MMHG | TEMPERATURE: 97.7 F | RESPIRATION RATE: 18 BRPM | BODY MASS INDEX: 26.16 KG/M2 | DIASTOLIC BLOOD PRESSURE: 73 MMHG | OXYGEN SATURATION: 99 %

## 2024-02-07 VITALS — BODY MASS INDEX: 26.38 KG/M2 | WEIGHT: 139.6 LBS | DIASTOLIC BLOOD PRESSURE: 68 MMHG | SYSTOLIC BLOOD PRESSURE: 122 MMHG

## 2024-02-07 DIAGNOSIS — O99.019 MATERNAL ANEMIA IN PREGNANCY, ANTEPARTUM: ICD-10-CM

## 2024-02-07 DIAGNOSIS — Z34.80 SUPERVISION OF OTHER NORMAL PREGNANCY, ANTEPARTUM: Primary | ICD-10-CM

## 2024-02-07 DIAGNOSIS — O09.299 HISTORY OF GESTATIONAL DIABETES IN PRIOR PREGNANCY, CURRENTLY PREGNANT: ICD-10-CM

## 2024-02-07 DIAGNOSIS — O24.410 DIET CONTROLLED GESTATIONAL DIABETES MELLITUS (GDM), ANTEPARTUM: ICD-10-CM

## 2024-02-07 DIAGNOSIS — Z86.32 HISTORY OF GESTATIONAL DIABETES IN PRIOR PREGNANCY, CURRENTLY PREGNANT: ICD-10-CM

## 2024-02-07 DIAGNOSIS — O99.019 MATERNAL ANEMIA IN PREGNANCY, ANTEPARTUM: Primary | ICD-10-CM

## 2024-02-07 LAB
GLUCOSE UR STRIP-MCNC: NEGATIVE MG/DL
PROT UR STRIP-MCNC: NEGATIVE MG/DL

## 2024-02-07 PROCEDURE — 96374 THER/PROPH/DIAG INJ IV PUSH: CPT

## 2024-02-07 PROCEDURE — 25010000002 IRON SUCROSE PER 1 MG: Performed by: OBSTETRICS & GYNECOLOGY

## 2024-02-07 RX ORDER — ACETAMINOPHEN 325 MG/1
650 TABLET ORAL ONCE
Status: DISCONTINUED | OUTPATIENT
Start: 2024-02-07 | End: 2024-02-09 | Stop reason: HOSPADM

## 2024-02-07 RX ORDER — SODIUM CHLORIDE 9 MG/ML
20 INJECTION, SOLUTION INTRAVENOUS ONCE
Status: CANCELLED | OUTPATIENT
Start: 2024-02-08

## 2024-02-07 RX ORDER — SODIUM CHLORIDE 9 MG/ML
20 INJECTION, SOLUTION INTRAVENOUS ONCE
Status: DISCONTINUED | OUTPATIENT
Start: 2024-02-07 | End: 2024-02-09 | Stop reason: HOSPADM

## 2024-02-07 RX ORDER — ACETAMINOPHEN 325 MG/1
650 TABLET ORAL ONCE
Status: CANCELLED | OUTPATIENT
Start: 2024-02-08

## 2024-02-07 RX ADMIN — IRON SUCROSE 200 MG: 20 INJECTION, SOLUTION INTRAVENOUS at 12:14

## 2024-02-07 NOTE — OUTREACH NOTE
[facility]  Check-In    Current Estimated Gestational Age: 37w1d      Questions/Answers      Flowsheet Row Responses   Best Method for Contacting Cell   Able to keep appointments as scheduled Yes   Gender(s) and Name(s) Girl   Baby Active/Feeling Fetal Movemen Yes   How are you presently feeling? Good   Questions regarding prenatal visits or tests to be ordered? No   New Diagnosis GDM   Supplies ready for baby Car Seat, Clothing, Crib, Diapers   Resource/Environmental Concerns None   Do you have any questions related to your care experience, your pregnancy, plans for delivery, any concerns, etc? No            Intake Assessment      Flowsheet Row Responses   Best Method for Contacting Cell   Able to keep appointments as scheduled Yes   Gender(s) and Name(s) Girl   Maternal Warning Signs Provided   Other: Provided            Learning Assessment      Flowsheet Row Responses   Relationship Patient   Does the learner have any barriers to learning? No Barriers   What is the preferred language of the learner for medical teaching? English   Is an  required? No            [facility]: [facility]  [facility]: [facility]  Feeding Plan: Breastfeeding - desires pump from hospital    No current concerns with food, housing or transportation.  Encouraged to reach out for any questions, needs or concerns.      Tobacco, Alcohol, and Drug History     reports that she has quit smoking. Her smoking use included cigarettes. She smoked an average of .5 packs per day. She does not have any smokeless tobacco history on file.   reports that she does not currently use alcohol.   reports no history of drug use.    Darlene Hodge RN  Maternity Nurse Navigator    2/7/2024, 13:45 EST

## 2024-02-08 ENCOUNTER — HOSPITAL ENCOUNTER (OUTPATIENT)
Dept: INFUSION THERAPY | Facility: HOSPITAL | Age: 34
Discharge: HOME | End: 2024-02-08
Payer: MEDICAID

## 2024-02-08 VITALS
DIASTOLIC BLOOD PRESSURE: 66 MMHG | HEIGHT: 61 IN | BODY MASS INDEX: 26.51 KG/M2 | OXYGEN SATURATION: 100 % | TEMPERATURE: 98.1 F | HEART RATE: 98 BPM | SYSTOLIC BLOOD PRESSURE: 111 MMHG | RESPIRATION RATE: 18 BRPM | WEIGHT: 140.43 LBS

## 2024-02-08 DIAGNOSIS — O99.019 MATERNAL ANEMIA IN PREGNANCY, ANTEPARTUM: Primary | ICD-10-CM

## 2024-02-08 PROCEDURE — 96374 THER/PROPH/DIAG INJ IV PUSH: CPT

## 2024-02-08 PROCEDURE — 25010000002 IRON SUCROSE PER 1 MG: Performed by: OBSTETRICS & GYNECOLOGY

## 2024-02-08 RX ORDER — ACETAMINOPHEN 325 MG/1
650 TABLET ORAL ONCE
Status: DISCONTINUED | OUTPATIENT
Start: 2024-02-08 | End: 2024-02-10 | Stop reason: HOSPADM

## 2024-02-08 RX ORDER — ACETAMINOPHEN 325 MG/1
650 TABLET ORAL ONCE
Status: CANCELLED | OUTPATIENT
Start: 2024-02-09

## 2024-02-08 RX ORDER — SODIUM CHLORIDE 9 MG/ML
20 INJECTION, SOLUTION INTRAVENOUS ONCE
Status: CANCELLED | OUTPATIENT
Start: 2024-02-09

## 2024-02-08 RX ORDER — SODIUM CHLORIDE 9 MG/ML
20 INJECTION, SOLUTION INTRAVENOUS ONCE
Status: DISCONTINUED | OUTPATIENT
Start: 2024-02-08 | End: 2024-02-10 | Stop reason: HOSPADM

## 2024-02-08 RX ADMIN — IRON SUCROSE 200 MG: 20 INJECTION, SOLUTION INTRAVENOUS at 14:26

## 2024-02-09 ENCOUNTER — HOSPITAL ENCOUNTER (OUTPATIENT)
Dept: INFUSION THERAPY | Facility: HOSPITAL | Age: 34
Discharge: HOME | End: 2024-02-09
Payer: MEDICAID

## 2024-02-09 VITALS
TEMPERATURE: 98.1 F | DIASTOLIC BLOOD PRESSURE: 64 MMHG | RESPIRATION RATE: 18 BRPM | HEART RATE: 106 BPM | OXYGEN SATURATION: 99 % | SYSTOLIC BLOOD PRESSURE: 116 MMHG

## 2024-02-09 DIAGNOSIS — O99.019 MATERNAL ANEMIA IN PREGNANCY, ANTEPARTUM: Primary | ICD-10-CM

## 2024-02-09 PROCEDURE — 25010000002 IRON SUCROSE PER 1 MG: Performed by: OBSTETRICS & GYNECOLOGY

## 2024-02-09 PROCEDURE — 96374 THER/PROPH/DIAG INJ IV PUSH: CPT

## 2024-02-09 RX ORDER — SODIUM CHLORIDE 9 MG/ML
20 INJECTION, SOLUTION INTRAVENOUS ONCE
Status: DISCONTINUED | OUTPATIENT
Start: 2024-02-09 | End: 2024-02-11 | Stop reason: HOSPADM

## 2024-02-09 RX ORDER — ACETAMINOPHEN 325 MG/1
650 TABLET ORAL ONCE
OUTPATIENT
Start: 2024-02-10

## 2024-02-09 RX ORDER — ACETAMINOPHEN 325 MG/1
650 TABLET ORAL ONCE
Status: DISCONTINUED | OUTPATIENT
Start: 2024-02-09 | End: 2024-02-11 | Stop reason: HOSPADM

## 2024-02-09 RX ORDER — SODIUM CHLORIDE 9 MG/ML
20 INJECTION, SOLUTION INTRAVENOUS ONCE
OUTPATIENT
Start: 2024-02-10

## 2024-02-09 RX ADMIN — IRON SUCROSE 200 MG: 20 INJECTION, SOLUTION INTRAVENOUS at 14:02

## 2024-02-12 ENCOUNTER — TELEPHONE (OUTPATIENT)
Dept: OBSTETRICS AND GYNECOLOGY | Facility: CLINIC | Age: 34
End: 2024-02-12
Payer: MEDICAID

## 2024-02-14 ENCOUNTER — ROUTINE PRENATAL (OUTPATIENT)
Dept: OBSTETRICS AND GYNECOLOGY | Facility: CLINIC | Age: 34
End: 2024-02-14
Payer: MEDICAID

## 2024-02-14 VITALS — DIASTOLIC BLOOD PRESSURE: 66 MMHG | WEIGHT: 142 LBS | SYSTOLIC BLOOD PRESSURE: 114 MMHG | BODY MASS INDEX: 26.83 KG/M2

## 2024-02-14 DIAGNOSIS — O24.410 DIET CONTROLLED GESTATIONAL DIABETES MELLITUS (GDM), ANTEPARTUM: ICD-10-CM

## 2024-02-14 DIAGNOSIS — Z34.80 SUPERVISION OF OTHER NORMAL PREGNANCY, ANTEPARTUM: Primary | ICD-10-CM

## 2024-02-14 DIAGNOSIS — O99.019 MATERNAL ANEMIA IN PREGNANCY, ANTEPARTUM: ICD-10-CM

## 2024-02-14 LAB
GLUCOSE UR STRIP-MCNC: ABNORMAL MG/DL
PROT UR STRIP-MCNC: NEGATIVE MG/DL

## 2024-02-15 ENCOUNTER — HOSPITAL ENCOUNTER (OUTPATIENT)
Dept: INFUSION THERAPY | Facility: HOSPITAL | Age: 34
Discharge: HOME | End: 2024-02-15
Payer: MEDICAID

## 2024-02-15 VITALS
RESPIRATION RATE: 16 BRPM | TEMPERATURE: 97.7 F | OXYGEN SATURATION: 98 % | SYSTOLIC BLOOD PRESSURE: 105 MMHG | DIASTOLIC BLOOD PRESSURE: 67 MMHG | HEART RATE: 80 BPM

## 2024-02-15 DIAGNOSIS — O99.019 MATERNAL ANEMIA IN PREGNANCY, ANTEPARTUM: Primary | ICD-10-CM

## 2024-02-15 PROCEDURE — 25010000002 IRON SUCROSE PER 1 MG: Performed by: OBSTETRICS & GYNECOLOGY

## 2024-02-15 PROCEDURE — 96374 THER/PROPH/DIAG INJ IV PUSH: CPT

## 2024-02-15 RX ORDER — ACETAMINOPHEN 325 MG/1
650 TABLET ORAL ONCE
Status: CANCELLED | OUTPATIENT
Start: 2024-02-16

## 2024-02-15 RX ORDER — SODIUM CHLORIDE 9 MG/ML
20 INJECTION, SOLUTION INTRAVENOUS ONCE
Status: DISCONTINUED | OUTPATIENT
Start: 2024-02-15 | End: 2024-02-17 | Stop reason: HOSPADM

## 2024-02-15 RX ORDER — SODIUM CHLORIDE 9 MG/ML
20 INJECTION, SOLUTION INTRAVENOUS ONCE
Status: CANCELLED | OUTPATIENT
Start: 2024-02-16

## 2024-02-15 RX ORDER — ACETAMINOPHEN 325 MG/1
650 TABLET ORAL ONCE
Status: DISCONTINUED | OUTPATIENT
Start: 2024-02-15 | End: 2024-02-17 | Stop reason: HOSPADM

## 2024-02-15 RX ADMIN — IRON SUCROSE 200 MG: 20 INJECTION, SOLUTION INTRAVENOUS at 11:08

## 2024-02-19 ENCOUNTER — HOSPITAL ENCOUNTER (OUTPATIENT)
Dept: INFUSION THERAPY | Facility: HOSPITAL | Age: 34
Discharge: HOME | End: 2024-02-19
Admitting: OBSTETRICS & GYNECOLOGY
Payer: MEDICAID

## 2024-02-19 ENCOUNTER — PREP FOR SURGERY (OUTPATIENT)
Dept: OTHER | Facility: HOSPITAL | Age: 34
End: 2024-02-19
Payer: MEDICAID

## 2024-02-19 VITALS
RESPIRATION RATE: 16 BRPM | OXYGEN SATURATION: 99 % | SYSTOLIC BLOOD PRESSURE: 118 MMHG | TEMPERATURE: 98.4 F | HEART RATE: 96 BPM | DIASTOLIC BLOOD PRESSURE: 63 MMHG

## 2024-02-19 DIAGNOSIS — O24.410 DIET CONTROLLED GESTATIONAL DIABETES MELLITUS (GDM), ANTEPARTUM: Primary | ICD-10-CM

## 2024-02-19 DIAGNOSIS — O99.019 MATERNAL ANEMIA IN PREGNANCY, ANTEPARTUM: Primary | ICD-10-CM

## 2024-02-19 PROCEDURE — 25010000002 IRON SUCROSE PER 1 MG: Performed by: OBSTETRICS & GYNECOLOGY

## 2024-02-19 PROCEDURE — 96374 THER/PROPH/DIAG INJ IV PUSH: CPT

## 2024-02-19 RX ORDER — MISOPROSTOL 200 UG/1
800 TABLET ORAL AS NEEDED
Status: CANCELLED | OUTPATIENT
Start: 2024-02-19

## 2024-02-19 RX ORDER — METHYLERGONOVINE MALEATE 0.2 MG/ML
200 INJECTION INTRAVENOUS ONCE AS NEEDED
Status: CANCELLED | OUTPATIENT
Start: 2024-02-19

## 2024-02-19 RX ORDER — PROMETHAZINE HYDROCHLORIDE 25 MG/1
25 TABLET ORAL EVERY 6 HOURS PRN
Status: CANCELLED | OUTPATIENT
Start: 2024-02-19

## 2024-02-19 RX ORDER — METOCLOPRAMIDE HYDROCHLORIDE 5 MG/ML
10 INJECTION INTRAMUSCULAR; INTRAVENOUS ONCE AS NEEDED
Status: CANCELLED | OUTPATIENT
Start: 2024-02-19

## 2024-02-19 RX ORDER — ACETAMINOPHEN 325 MG/1
650 TABLET ORAL ONCE
Status: CANCELLED | OUTPATIENT
Start: 2024-02-19

## 2024-02-19 RX ORDER — ACETAMINOPHEN 325 MG/1
650 TABLET ORAL EVERY 4 HOURS PRN
Status: CANCELLED | OUTPATIENT
Start: 2024-02-19

## 2024-02-19 RX ORDER — SODIUM CHLORIDE 9 MG/ML
20 INJECTION, SOLUTION INTRAVENOUS ONCE
OUTPATIENT
Start: 2024-02-19

## 2024-02-19 RX ORDER — OXYTOCIN/0.9 % SODIUM CHLORIDE 30/500 ML
999 PLASTIC BAG, INJECTION (ML) INTRAVENOUS ONCE
Status: CANCELLED | OUTPATIENT
Start: 2024-02-19 | End: 2024-02-19

## 2024-02-19 RX ORDER — SODIUM CHLORIDE, SODIUM LACTATE, POTASSIUM CHLORIDE, CALCIUM CHLORIDE 600; 310; 30; 20 MG/100ML; MG/100ML; MG/100ML; MG/100ML
125 INJECTION, SOLUTION INTRAVENOUS CONTINUOUS
Status: CANCELLED | OUTPATIENT
Start: 2024-02-19

## 2024-02-19 RX ORDER — SODIUM CHLORIDE 0.9 % (FLUSH) 0.9 %
10 SYRINGE (ML) INJECTION EVERY 12 HOURS SCHEDULED
Status: CANCELLED | OUTPATIENT
Start: 2024-02-19

## 2024-02-19 RX ORDER — IBUPROFEN 800 MG/1
800 TABLET ORAL ONCE AS NEEDED
Status: CANCELLED | OUTPATIENT
Start: 2024-02-19

## 2024-02-19 RX ORDER — PROMETHAZINE HYDROCHLORIDE 12.5 MG/1
12.5 TABLET ORAL EVERY 6 HOURS PRN
Status: CANCELLED | OUTPATIENT
Start: 2024-02-19

## 2024-02-19 RX ORDER — FAMOTIDINE 20 MG/1
20 TABLET, FILM COATED ORAL ONCE AS NEEDED
Status: CANCELLED | OUTPATIENT
Start: 2024-02-19

## 2024-02-19 RX ORDER — ONDANSETRON 2 MG/ML
4 INJECTION INTRAMUSCULAR; INTRAVENOUS EVERY 6 HOURS PRN
Status: CANCELLED | OUTPATIENT
Start: 2024-02-19

## 2024-02-19 RX ORDER — HYDROCODONE BITARTRATE AND ACETAMINOPHEN 5; 325 MG/1; MG/1
1 TABLET ORAL EVERY 4 HOURS PRN
Status: CANCELLED | OUTPATIENT
Start: 2024-02-19 | End: 2024-02-26

## 2024-02-19 RX ORDER — OXYTOCIN/0.9 % SODIUM CHLORIDE 30/500 ML
250 PLASTIC BAG, INJECTION (ML) INTRAVENOUS CONTINUOUS
Status: CANCELLED | OUTPATIENT
Start: 2024-02-19 | End: 2024-02-19

## 2024-02-19 RX ORDER — TERBUTALINE SULFATE 1 MG/ML
0.25 INJECTION, SOLUTION SUBCUTANEOUS AS NEEDED
Status: CANCELLED | OUTPATIENT
Start: 2024-02-19

## 2024-02-19 RX ORDER — HYDROCODONE BITARTRATE AND ACETAMINOPHEN 10; 325 MG/1; MG/1
1 TABLET ORAL EVERY 4 HOURS PRN
Status: CANCELLED | OUTPATIENT
Start: 2024-02-19 | End: 2024-02-26

## 2024-02-19 RX ORDER — ACETAMINOPHEN 325 MG/1
650 TABLET ORAL ONCE AS NEEDED
Status: CANCELLED | OUTPATIENT
Start: 2024-02-19

## 2024-02-19 RX ORDER — MAGNESIUM CARB/ALUMINUM HYDROX 105-160MG
30 TABLET,CHEWABLE ORAL ONCE
Status: CANCELLED | OUTPATIENT
Start: 2024-02-19 | End: 2024-02-19

## 2024-02-19 RX ORDER — CITRIC ACID/SODIUM CITRATE 334-500MG
30 SOLUTION, ORAL ORAL ONCE AS NEEDED
Status: CANCELLED | OUTPATIENT
Start: 2024-02-19

## 2024-02-19 RX ORDER — ONDANSETRON 4 MG/1
4 TABLET, ORALLY DISINTEGRATING ORAL EVERY 6 HOURS PRN
Status: CANCELLED | OUTPATIENT
Start: 2024-02-19

## 2024-02-19 RX ORDER — FAMOTIDINE 20 MG/1
20 TABLET, FILM COATED ORAL 2 TIMES DAILY PRN
Status: CANCELLED | OUTPATIENT
Start: 2024-02-19

## 2024-02-19 RX ORDER — FAMOTIDINE 10 MG/ML
20 INJECTION, SOLUTION INTRAVENOUS ONCE AS NEEDED
Status: CANCELLED | OUTPATIENT
Start: 2024-02-19

## 2024-02-19 RX ORDER — SODIUM CHLORIDE 0.9 % (FLUSH) 0.9 %
10 SYRINGE (ML) INJECTION AS NEEDED
Status: CANCELLED | OUTPATIENT
Start: 2024-02-19

## 2024-02-19 RX ORDER — LIDOCAINE HYDROCHLORIDE 10 MG/ML
0.5 INJECTION, SOLUTION INFILTRATION; PERINEURAL ONCE AS NEEDED
Status: CANCELLED | OUTPATIENT
Start: 2024-02-19

## 2024-02-19 RX ORDER — FAMOTIDINE 10 MG/ML
20 INJECTION, SOLUTION INTRAVENOUS 2 TIMES DAILY PRN
Status: CANCELLED | OUTPATIENT
Start: 2024-02-19

## 2024-02-19 RX ORDER — SODIUM CHLORIDE 9 MG/ML
40 INJECTION, SOLUTION INTRAVENOUS AS NEEDED
Status: CANCELLED | OUTPATIENT
Start: 2024-02-19

## 2024-02-19 RX ORDER — ACETAMINOPHEN 325 MG/1
650 TABLET ORAL ONCE
Status: DISCONTINUED | OUTPATIENT
Start: 2024-02-19 | End: 2024-02-21 | Stop reason: HOSPADM

## 2024-02-19 RX ORDER — OXYTOCIN/0.9 % SODIUM CHLORIDE 30/500 ML
2 PLASTIC BAG, INJECTION (ML) INTRAVENOUS
Status: CANCELLED | OUTPATIENT
Start: 2024-02-19

## 2024-02-19 RX ADMIN — IRON SUCROSE 200 MG: 20 INJECTION, SOLUTION INTRAVENOUS at 11:23

## 2024-02-20 ENCOUNTER — HOSPITAL ENCOUNTER (OUTPATIENT)
Dept: LABOR AND DELIVERY | Facility: HOSPITAL | Age: 34
Discharge: HOME | End: 2024-02-20
Payer: MEDICAID

## 2024-02-20 ENCOUNTER — HOSPITAL ENCOUNTER (INPATIENT)
Facility: HOSPITAL | Age: 34
LOS: 2 days | Discharge: HOME | End: 2024-02-22
Attending: OBSTETRICS & GYNECOLOGY | Admitting: OBSTETRICS & GYNECOLOGY
Payer: MEDICAID

## 2024-02-20 DIAGNOSIS — O24.410 DIET CONTROLLED GESTATIONAL DIABETES MELLITUS (GDM), ANTEPARTUM: ICD-10-CM

## 2024-02-20 PROBLEM — Z34.90 ENCOUNTER FOR INDUCTION OF LABOR: Status: ACTIVE | Noted: 2024-02-20

## 2024-02-20 PROBLEM — O47.9 UTERINE CONTRACTIONS: Status: ACTIVE | Noted: 2024-02-20

## 2024-02-20 LAB
ABO GROUP BLD: NORMAL
AMPHET+METHAMPHET UR QL: NEGATIVE
BARBITURATES UR QL SCN: NEGATIVE
BASE EXCESS BLDCOA CALC-SCNC: -3.2 MMOL/L (ref -2–2)
BENZODIAZ UR QL SCN: NEGATIVE
BLD GP AB SCN SERPL QL: NEGATIVE
CANNABINOIDS SERPL QL: NEGATIVE
COCAINE UR QL: NEGATIVE
DEPRECATED RDW RBC AUTO: 44.5 FL (ref 37–54)
ERYTHROCYTE [DISTWIDTH] IN BLOOD BY AUTOMATED COUNT: 13.7 % (ref 12.3–15.4)
FENTANYL UR-MCNC: NEGATIVE NG/ML
HCO3 BLDCOA-SCNC: 23.9 MMOL/L
HCT VFR BLD AUTO: 32 % (ref 34–46.6)
HGB BLD-MCNC: 10.6 G/DL (ref 12–15.9)
MCH RBC QN AUTO: 29.9 PG (ref 26.6–33)
MCHC RBC AUTO-ENTMCNC: 33.1 G/DL (ref 31.5–35.7)
MCV RBC AUTO: 90.1 FL (ref 79–97)
METHADONE UR QL SCN: NEGATIVE
OPIATES UR QL: NEGATIVE
OXYCODONE UR QL SCN: NEGATIVE
PCO2 BLDCOA: 50.9 MMHG (ref 33–49)
PH BLDCOA: 7.29 PH UNITS (ref 7.21–7.31)
PLATELET # BLD AUTO: 169 10*3/MM3 (ref 140–450)
PMV BLD AUTO: 9.4 FL (ref 6–12)
PO2 BLDCOA: <40.5 MMHG
RBC # BLD AUTO: 3.55 10*6/MM3 (ref 3.77–5.28)
RH BLD: POSITIVE
T PALLIDUM IGG SER QL: NORMAL
T&S EXPIRATION DATE: NORMAL
WBC NRBC COR # BLD AUTO: 5.84 10*3/MM3 (ref 3.4–10.8)

## 2024-02-20 PROCEDURE — 86901 BLOOD TYPING SEROLOGIC RH(D): CPT | Performed by: OBSTETRICS & GYNECOLOGY

## 2024-02-20 PROCEDURE — 80307 DRUG TEST PRSMV CHEM ANLYZR: CPT | Performed by: OBSTETRICS & GYNECOLOGY

## 2024-02-20 PROCEDURE — 86780 TREPONEMA PALLIDUM: CPT | Performed by: OBSTETRICS & GYNECOLOGY

## 2024-02-20 PROCEDURE — 59410 OBSTETRICAL CARE: CPT | Performed by: OBSTETRICS & GYNECOLOGY

## 2024-02-20 PROCEDURE — 82803 BLOOD GASES ANY COMBINATION: CPT | Performed by: OBSTETRICS & GYNECOLOGY

## 2024-02-20 PROCEDURE — 86850 RBC ANTIBODY SCREEN: CPT | Performed by: OBSTETRICS & GYNECOLOGY

## 2024-02-20 PROCEDURE — 86900 BLOOD TYPING SEROLOGIC ABO: CPT | Performed by: OBSTETRICS & GYNECOLOGY

## 2024-02-20 PROCEDURE — 85027 COMPLETE CBC AUTOMATED: CPT | Performed by: OBSTETRICS & GYNECOLOGY

## 2024-02-20 RX ORDER — FAMOTIDINE 20 MG/1
20 TABLET, FILM COATED ORAL 2 TIMES DAILY PRN
Status: DISCONTINUED | OUTPATIENT
Start: 2024-02-20 | End: 2024-02-20 | Stop reason: HOSPADM

## 2024-02-20 RX ORDER — HYDROCODONE BITARTRATE AND ACETAMINOPHEN 5; 325 MG/1; MG/1
1 TABLET ORAL EVERY 4 HOURS PRN
Status: DISCONTINUED | OUTPATIENT
Start: 2024-02-20 | End: 2024-02-20 | Stop reason: HOSPADM

## 2024-02-20 RX ORDER — ONDANSETRON 4 MG/1
4 TABLET, ORALLY DISINTEGRATING ORAL EVERY 6 HOURS PRN
Status: DISCONTINUED | OUTPATIENT
Start: 2024-02-20 | End: 2024-02-20 | Stop reason: HOSPADM

## 2024-02-20 RX ORDER — PROMETHAZINE HYDROCHLORIDE 25 MG/1
25 TABLET ORAL EVERY 6 HOURS PRN
Status: DISCONTINUED | OUTPATIENT
Start: 2024-02-20 | End: 2024-02-20 | Stop reason: HOSPADM

## 2024-02-20 RX ORDER — METOCLOPRAMIDE HYDROCHLORIDE 5 MG/ML
10 INJECTION INTRAMUSCULAR; INTRAVENOUS ONCE AS NEEDED
Status: DISCONTINUED | OUTPATIENT
Start: 2024-02-20 | End: 2024-02-20 | Stop reason: HOSPADM

## 2024-02-20 RX ORDER — OXYTOCIN/0.9 % SODIUM CHLORIDE 30/500 ML
125 PLASTIC BAG, INJECTION (ML) INTRAVENOUS ONCE AS NEEDED
Status: DISCONTINUED | OUTPATIENT
Start: 2024-02-20 | End: 2024-02-22 | Stop reason: HOSPADM

## 2024-02-20 RX ORDER — DOCUSATE SODIUM 100 MG/1
100 CAPSULE, LIQUID FILLED ORAL 2 TIMES DAILY
Status: DISCONTINUED | OUTPATIENT
Start: 2024-02-20 | End: 2024-02-22 | Stop reason: HOSPADM

## 2024-02-20 RX ORDER — PROMETHAZINE HYDROCHLORIDE 12.5 MG/1
12.5 TABLET ORAL EVERY 6 HOURS PRN
Status: DISCONTINUED | OUTPATIENT
Start: 2024-02-20 | End: 2024-02-20 | Stop reason: HOSPADM

## 2024-02-20 RX ORDER — SODIUM CHLORIDE 0.9 % (FLUSH) 0.9 %
10 SYRINGE (ML) INJECTION AS NEEDED
Status: DISCONTINUED | OUTPATIENT
Start: 2024-02-20 | End: 2024-02-20 | Stop reason: HOSPADM

## 2024-02-20 RX ORDER — MISOPROSTOL 200 UG/1
200 TABLET ORAL EVERY 6 HOURS SCHEDULED
Status: DISPENSED | OUTPATIENT
Start: 2024-02-20 | End: 2024-02-21

## 2024-02-20 RX ORDER — OXYTOCIN/0.9 % SODIUM CHLORIDE 30/500 ML
2 PLASTIC BAG, INJECTION (ML) INTRAVENOUS
Status: DISCONTINUED | OUTPATIENT
Start: 2024-02-20 | End: 2024-02-20 | Stop reason: HOSPADM

## 2024-02-20 RX ORDER — HYDROCODONE BITARTRATE AND ACETAMINOPHEN 5; 325 MG/1; MG/1
1 TABLET ORAL EVERY 4 HOURS PRN
Status: DISCONTINUED | OUTPATIENT
Start: 2024-02-20 | End: 2024-02-22 | Stop reason: HOSPADM

## 2024-02-20 RX ORDER — SODIUM CHLORIDE 0.9 % (FLUSH) 0.9 %
10 SYRINGE (ML) INJECTION EVERY 12 HOURS SCHEDULED
Status: DISCONTINUED | OUTPATIENT
Start: 2024-02-20 | End: 2024-02-20 | Stop reason: HOSPADM

## 2024-02-20 RX ORDER — SODIUM CHLORIDE 9 MG/ML
40 INJECTION, SOLUTION INTRAVENOUS AS NEEDED
Status: DISCONTINUED | OUTPATIENT
Start: 2024-02-20 | End: 2024-02-20 | Stop reason: HOSPADM

## 2024-02-20 RX ORDER — METHYLERGONOVINE MALEATE 0.2 MG/ML
200 INJECTION INTRAVENOUS ONCE AS NEEDED
Status: DISCONTINUED | OUTPATIENT
Start: 2024-02-20 | End: 2024-02-20 | Stop reason: HOSPADM

## 2024-02-20 RX ORDER — FAMOTIDINE 20 MG/1
20 TABLET, FILM COATED ORAL ONCE AS NEEDED
Status: DISCONTINUED | OUTPATIENT
Start: 2024-02-20 | End: 2024-02-20 | Stop reason: HOSPADM

## 2024-02-20 RX ORDER — ACETAMINOPHEN 325 MG/1
650 TABLET ORAL ONCE AS NEEDED
Status: DISCONTINUED | OUTPATIENT
Start: 2024-02-20 | End: 2024-02-20 | Stop reason: HOSPADM

## 2024-02-20 RX ORDER — ONDANSETRON 2 MG/ML
4 INJECTION INTRAMUSCULAR; INTRAVENOUS EVERY 6 HOURS PRN
Status: DISCONTINUED | OUTPATIENT
Start: 2024-02-20 | End: 2024-02-22 | Stop reason: HOSPADM

## 2024-02-20 RX ORDER — ONDANSETRON 2 MG/ML
4 INJECTION INTRAMUSCULAR; INTRAVENOUS EVERY 6 HOURS PRN
Status: DISCONTINUED | OUTPATIENT
Start: 2024-02-20 | End: 2024-02-20 | Stop reason: HOSPADM

## 2024-02-20 RX ORDER — FAMOTIDINE 10 MG/ML
20 INJECTION, SOLUTION INTRAVENOUS ONCE AS NEEDED
Status: DISCONTINUED | OUTPATIENT
Start: 2024-02-20 | End: 2024-02-20 | Stop reason: HOSPADM

## 2024-02-20 RX ORDER — MORPHINE SULFATE 5 MG/ML
5 INJECTION, SOLUTION INTRAMUSCULAR; INTRAVENOUS
Status: DISCONTINUED | OUTPATIENT
Start: 2024-02-20 | End: 2024-02-20 | Stop reason: HOSPADM

## 2024-02-20 RX ORDER — LIDOCAINE HYDROCHLORIDE 10 MG/ML
0.5 INJECTION, SOLUTION INFILTRATION; PERINEURAL ONCE AS NEEDED
Status: DISCONTINUED | OUTPATIENT
Start: 2024-02-20 | End: 2024-02-20 | Stop reason: HOSPADM

## 2024-02-20 RX ORDER — ACETAMINOPHEN 325 MG/1
650 TABLET ORAL EVERY 6 HOURS PRN
Status: DISCONTINUED | OUTPATIENT
Start: 2024-02-20 | End: 2024-02-22 | Stop reason: HOSPADM

## 2024-02-20 RX ORDER — OXYTOCIN/0.9 % SODIUM CHLORIDE 30/500 ML
999 PLASTIC BAG, INJECTION (ML) INTRAVENOUS ONCE
Status: COMPLETED | OUTPATIENT
Start: 2024-02-20 | End: 2024-02-20

## 2024-02-20 RX ORDER — IBUPROFEN 600 MG/1
600 TABLET ORAL EVERY 6 HOURS PRN
Status: DISCONTINUED | OUTPATIENT
Start: 2024-02-20 | End: 2024-02-22 | Stop reason: HOSPADM

## 2024-02-20 RX ORDER — MAGNESIUM CARB/ALUMINUM HYDROX 105-160MG
30 TABLET,CHEWABLE ORAL ONCE
Status: DISCONTINUED | OUTPATIENT
Start: 2024-02-20 | End: 2024-02-20 | Stop reason: HOSPADM

## 2024-02-20 RX ORDER — OXYTOCIN/0.9 % SODIUM CHLORIDE 30/500 ML
250 PLASTIC BAG, INJECTION (ML) INTRAVENOUS CONTINUOUS
Status: ACTIVE | OUTPATIENT
Start: 2024-02-20 | End: 2024-02-20

## 2024-02-20 RX ORDER — CITRIC ACID/SODIUM CITRATE 334-500MG
30 SOLUTION, ORAL ORAL ONCE AS NEEDED
Status: DISCONTINUED | OUTPATIENT
Start: 2024-02-20 | End: 2024-02-20 | Stop reason: HOSPADM

## 2024-02-20 RX ORDER — ONDANSETRON 4 MG/1
4 TABLET, ORALLY DISINTEGRATING ORAL EVERY 6 HOURS PRN
Status: DISCONTINUED | OUTPATIENT
Start: 2024-02-20 | End: 2024-02-22 | Stop reason: HOSPADM

## 2024-02-20 RX ORDER — IBUPROFEN 800 MG/1
800 TABLET ORAL ONCE AS NEEDED
Status: COMPLETED | OUTPATIENT
Start: 2024-02-20 | End: 2024-02-20

## 2024-02-20 RX ORDER — ACETAMINOPHEN 325 MG/1
650 TABLET ORAL EVERY 4 HOURS PRN
Status: DISCONTINUED | OUTPATIENT
Start: 2024-02-20 | End: 2024-02-20 | Stop reason: HOSPADM

## 2024-02-20 RX ORDER — HYDROCODONE BITARTRATE AND ACETAMINOPHEN 10; 325 MG/1; MG/1
1 TABLET ORAL EVERY 4 HOURS PRN
Status: DISCONTINUED | OUTPATIENT
Start: 2024-02-20 | End: 2024-02-20 | Stop reason: HOSPADM

## 2024-02-20 RX ORDER — HYDROCODONE BITARTRATE AND ACETAMINOPHEN 10; 325 MG/1; MG/1
1 TABLET ORAL EVERY 4 HOURS PRN
Status: DISCONTINUED | OUTPATIENT
Start: 2024-02-20 | End: 2024-02-22 | Stop reason: HOSPADM

## 2024-02-20 RX ORDER — MISOPROSTOL 200 UG/1
800 TABLET ORAL AS NEEDED
Status: DISCONTINUED | OUTPATIENT
Start: 2024-02-20 | End: 2024-02-20 | Stop reason: HOSPADM

## 2024-02-20 RX ORDER — TERBUTALINE SULFATE 1 MG/ML
0.25 INJECTION, SOLUTION SUBCUTANEOUS AS NEEDED
Status: DISCONTINUED | OUTPATIENT
Start: 2024-02-20 | End: 2024-02-20 | Stop reason: HOSPADM

## 2024-02-20 RX ORDER — FAMOTIDINE 10 MG/ML
20 INJECTION, SOLUTION INTRAVENOUS 2 TIMES DAILY PRN
Status: DISCONTINUED | OUTPATIENT
Start: 2024-02-20 | End: 2024-02-20 | Stop reason: HOSPADM

## 2024-02-20 RX ORDER — SODIUM CHLORIDE, SODIUM LACTATE, POTASSIUM CHLORIDE, CALCIUM CHLORIDE 600; 310; 30; 20 MG/100ML; MG/100ML; MG/100ML; MG/100ML
125 INJECTION, SOLUTION INTRAVENOUS CONTINUOUS
Status: DISCONTINUED | OUTPATIENT
Start: 2024-02-20 | End: 2024-02-20

## 2024-02-20 RX ADMIN — IBUPROFEN 600 MG: 600 TABLET, FILM COATED ORAL at 17:43

## 2024-02-20 RX ADMIN — MISOPROSTOL 200 MCG: 200 TABLET ORAL at 17:43

## 2024-02-20 RX ADMIN — DOCUSATE SODIUM 100 MG: 100 CAPSULE, LIQUID FILLED ORAL at 21:19

## 2024-02-20 RX ADMIN — Medication 999 ML/HR: at 06:48

## 2024-02-20 RX ADMIN — DOCUSATE SODIUM 100 MG: 100 CAPSULE, LIQUID FILLED ORAL at 10:50

## 2024-02-20 RX ADMIN — IBUPROFEN 800 MG: 800 TABLET, FILM COATED ORAL at 07:54

## 2024-02-20 RX ADMIN — ACETAMINOPHEN 650 MG: 325 TABLET ORAL at 21:20

## 2024-02-20 NOTE — H&P
Obstetric History and Physical     Patsy Motley is a 33 y.o.  at 39w0d weeks EGA.  She was scheduled for IOL but presented in spontaneous labor and undwent normal , see Dr. Ramirez's note for detail. Patient denies any vaginal bleeding, loss of fluid or decreased fetal movements.  Her ACOG is reviewed and current.    Past Medical History:   Diagnosis Date    Abnormal Pap smear of cervix     Gestational diabetes     boarderline       History reviewed. No pertinent surgical history.    Family History   Problem Relation Age of Onset    Heart disease Father     Heart disease Mother     Ovarian cancer Neg Hx     Uterine cancer Neg Hx     Breast cancer - additional onset Neg Hx     Breast cancer Neg Hx     Colon cancer Neg Hx     Prostate cancer Neg Hx     Clotting disorder Neg Hx        Social History     Tobacco Use    Smoking status: Former     Packs/day: .5     Types: Cigarettes    Tobacco comments:     Vape   Vaping Use    Vaping Use: Never used   Substance Use Topics    Alcohol use: Not Currently     Comment: every weekend    Drug use: No       Prior to Admission Meds: (Last known outpatient meds at time of note signature)  Prior to Admission medications    Medication Sig Start Date End Date Taking? Authorizing Provider   aspirin 81 MG EC tablet Take 1 tablet by mouth Daily. 23  Yes Yogesh Sutherland MD   Alcohol Swabs pads Use to clean fingers before checking BS 4 times per day and PRN 23   Mindy Flores DO   Blood Glucose Monitoring Suppl (FreeStyle Lite) w/Device kit by Other route 4 (Four) Times a Day. use to test blood sugar 22   Provider, MD Prudence   docusate sodium (Colace) 100 MG capsule Take 1 capsule by mouth 2 (Two) Times a Day As Needed for Constipation. 23   Mindy Flores DO   famotidine (PEPCID) 20 MG tablet Take 1 tablet by mouth 2 (Two) Times a Day. 23   Mindy Flores DO   ferrous sulfate 325 (65 FE) MG tablet Take 1 tablet by mouth Every Other Day  for 150 days. 12/18/23 5/16/24  Mindy Flores DO   glucose blood (FREESTYLE LITE) test strip Use to check blood glucose levels before breakfast and two hours after dinner 8/4/23   Cm Marie APRN   glucose blood test strip Check BS 4x/day as instructed. 11/20/23   Mindy Flores DO   glucose monitor monitoring kit Check BS FOUR times per day (fasting and 2hrs after meals) and record.  Bring blood sugar record to next office visit. 11/20/23   Minyd Flores DO   Lancets (freestyle) lancets Use to check blood glucose levels before breakfast and two hours after dinner 8/4/23   Cm Marie APRN   Lancets misc Use 120 each 4 (Four) Times a Day. Check blood sugars four times daily as directed 11/20/23   Mindy Flores DO   Prenatal Vit-Fe Fumarate-FA (Prenatal Vitamin) 27-0.8 MG tablet Take 1 tablet by mouth Daily.    Provider, MD Prudence         ROS:    General ROS: negative for - chills or fatigue  Ophthalmic ROS: negative for - blurry vision or decreased vision  ENT ROS: negative for - epistaxis, headaches or hearing change  Allergy and Immunology ROS: negative for - hives or insect bite sensitivity  Hematological and Lymphatic ROS: negative for - bleeding problems or blood clots  Endocrine ROS: negative for - breast changes or galactorrhea  Breast ROS: negative for - galactorrhea or new or changing breast lumps  Respiratory ROS: negative for - cough or hemoptysis  Cardiovascular ROS: negative for - chest pain or dyspnea on exertion  Gastrointestinal ROS: negative for - abdominal pain or appetite loss  Genito-Urinary ROS: negative for - change in urinary stream, dysuria   Musculoskeletal ROS: negative for - gait disturbance or joint pain  Neurological ROS: negative for - behavioral changes or bowel and bladder control changes  Dermatological ROS: negative for acne and dry skin      Vitals:    02/20/24 1421   BP: 116/76   Pulse: 82   Resp: 16   Temp: 98.1 °F (36.7 °C)       Physical Exam:   James Delivered, see L&D note for details      Plans reviewed and discussed with Patsy who is in agreement.        ASSESSMENT:  39w0d spontaneous labor   Labor  GBS: Positive      PLAN:  Admitted to L&D  Delivery: See labor orders, plan for     Plan of care NLT hospital course, R/B/A/potential SE, suspected length 24-48+hrs have been reviewed with patient and any family or friends present, questions answered to her/his/their satisfaction.  Pt desires to proceed as above.      Shirley Gonzalez, DO

## 2024-02-20 NOTE — PLAN OF CARE
Problem: Bleeding (Labor)  Goal: Hemostasis  Outcome: Met     Problem: Change in Fetal Wellbeing (Labor)  Goal: Stable Fetal Wellbeing  Outcome: Met     Problem: Delayed Labor Progression (Labor)  Goal: Effective Progression to Delivery  Outcome: Met     Problem: Infection (Labor)  Goal: Absence of Infection Signs and Symptoms  Outcome: Met     Problem: Labor Pain (Labor)  Goal: Acceptable Pain Control  Outcome: Met     Problem: Uterine Tachysystole (Labor)  Goal: Normal Uterine Contraction Pattern  Outcome: Met   Goal Outcome Evaluation:         Continue care

## 2024-02-20 NOTE — PLAN OF CARE
Problem: Adult Inpatient Plan of Care  Goal: Plan of Care Review  Outcome: Ongoing, Progressing  Goal: Patient-Specific Goal (Individualized)  Outcome: Ongoing, Progressing  Goal: Absence of Hospital-Acquired Illness or Injury  Outcome: Ongoing, Progressing  Intervention: Identify and Manage Fall Risk  Intervention: Prevent and Manage VTE (Venous Thromboembolism) Risk  Goal: Optimal Comfort and Wellbeing  Outcome: Ongoing, Progressing  Goal: Readiness for Transition of Care  Outcome: Ongoing, Progressing     Problem: Adjustment to Role Transition (Postpartum Vaginal Delivery)  Goal: Successful Maternal Role Transition  Outcome: Ongoing, Progressing     Problem: Bleeding (Postpartum Vaginal Delivery)  Goal: Hemostasis  Outcome: Ongoing, Progressing     Problem: Infection (Postpartum Vaginal Delivery)  Goal: Absence of Infection Signs/Symptoms  Outcome: Ongoing, Progressing  Intervention: Prevent or Manage Infection     Problem: Pain (Postpartum Vaginal Delivery)  Goal: Acceptable Pain Control  Outcome: Ongoing, Progressing     Problem: Urinary Retention (Postpartum Vaginal Delivery)  Goal: Effective Urinary Elimination  Outcome: Ongoing, Progressing     Problem: Breastfeeding  Goal: Effective Breastfeeding  Outcome: Ongoing, Progressing   Goal Outcome Evaluation:      Pt is doing well. Voiding . Up ad nate doing her own and infant care. Assessment wnl. Appropriate bonding.

## 2024-02-20 NOTE — PLAN OF CARE
Problem: Bleeding (Labor)  Goal: Hemostasis  Outcome: Met     Problem: Change in Fetal Wellbeing (Labor)  Goal: Stable Fetal Wellbeing  Outcome: Met     Problem: Delayed Labor Progression (Labor)  Goal: Effective Progression to Delivery  Outcome: Met     Problem: Infection (Labor)  Goal: Absence of Infection Signs and Symptoms  Outcome: Met     Problem: Labor Pain (Labor)  Goal: Acceptable Pain Control  Outcome: Met     Problem: Uterine Tachysystole (Labor)  Goal: Normal Uterine Contraction Pattern  Outcome: Met   Goal Outcome Evaluation:            Delivered vaginally

## 2024-02-20 NOTE — L&D DELIVERY NOTE
" Contreras  Vaginal Delivery Note    Delivery     Delivery: Vaginal, Spontaneous     YOB: 2025    Time of Birth:  Gestational Age 6:43 AM   39w0d     Anesthesia: Other     Delivering clinician: Vivi Ramirez    Forceps?   No   Vacuum? No    Shoulder dystocia present: No        Delivery narrative:  I was called to the room as the patient was complete complete +3  station.  The patient underwent a spontaneous vaginal delivery of a viable female  at 0643 hrs. There was a loose nuchal cord that was reduced prior to delivery shoulders. The infant was bulb suctioned prior to delivery the shoulders.  The cord was clamped x2 and cut after 60 seconds.  A specimen for the arterial cord pH and cord blood was obtained.  The placenta was delivered spontaneously and intact.  The weight was 7  pounds and 13.9 ounces and the Apgars were 8 and 8.  Episiotomy was not performed.No lacerations were noted.  The rectal, vaginal and cervical exams were within normal limits.  The infant was in the warmer and mom was in recovery in stable and satisfactory condition.  The sponge counts and instrument counts were verified as correct.    Infant    Findings: female  infant     Infant observations: Weight: 3570 g (7 lb 13.9 oz)   Length: 20  in  Observations/Comments:        Apgars: 8  @ 1 minute /    8  @ 5 minutes         Placenta, Cord, and Fluid    Placenta delivered  Spontaneous  at   2024  6:48 AM     Cord: 3 vessels  present.   Nuchal Cord?  yes; Number of nuchal loops present:  1    Cord blood obtained: Yes    Cord gases obtained:  Yes    Cord gas results: Venous:  No results found for: \"PHCVEN\", \"BECVEN\"    Arterial:    pH, Cord Arterial   Date Value Ref Range Status   2024 7.29 7.21 - 7.31 pH Units Final     Base Exc, Cord Arterial   Date Value Ref Range Status   2024 -3.2 (L) -2.0 - 2.0 mmol/L Final        Repair    Episiotomy: None     No    Lacerations: No   Estimated Blood Loss: Est. Blood " Loss (mL): 250 mL (Filed from Delivery Summary) (02/20/24 0643)           Complications  none    Disposition  Mother to Mother Baby/Postpartum  in stable condition currently.  Baby to remains with mom  in stable condition currently.      Vivi Ramirez DO  02/20/24  07:48 EST

## 2024-02-20 NOTE — PLAN OF CARE
Problem: Bleeding (Labor)  Goal: Hemostasis  Outcome: Met     Problem: Change in Fetal Wellbeing (Labor)  Goal: Stable Fetal Wellbeing  Outcome: Met     Problem: Delayed Labor Progression (Labor)  Goal: Effective Progression to Delivery  Outcome: Met     Problem: Infection (Labor)  Goal: Absence of Infection Signs and Symptoms  Outcome: Met     Problem: Labor Pain (Labor)  Goal: Acceptable Pain Control  Outcome: Met     Problem: Uterine Tachysystole (Labor)  Goal: Normal Uterine Contraction Pattern  Outcome: Met   Goal Outcome Evaluation:         Now on postpartum. Settled in with infant in room.

## 2024-02-21 ENCOUNTER — PATIENT OUTREACH (OUTPATIENT)
Dept: LABOR AND DELIVERY | Facility: HOSPITAL | Age: 34
End: 2024-02-21
Payer: MEDICAID

## 2024-02-21 RX ADMIN — DOCUSATE SODIUM 100 MG: 100 CAPSULE, LIQUID FILLED ORAL at 20:12

## 2024-02-21 RX ADMIN — IBUPROFEN 600 MG: 600 TABLET, FILM COATED ORAL at 10:05

## 2024-02-21 RX ADMIN — MISOPROSTOL 200 MCG: 200 TABLET ORAL at 05:21

## 2024-02-21 RX ADMIN — IBUPROFEN 600 MG: 600 TABLET, FILM COATED ORAL at 18:18

## 2024-02-21 RX ADMIN — IBUPROFEN 600 MG: 600 TABLET, FILM COATED ORAL at 00:33

## 2024-02-21 RX ADMIN — MISOPROSTOL 200 MCG: 200 TABLET ORAL at 00:01

## 2024-02-21 RX ADMIN — DOCUSATE SODIUM 100 MG: 100 CAPSULE, LIQUID FILLED ORAL at 08:00

## 2024-02-21 NOTE — OUTREACH NOTE
[facility]  IP Postpartum    Questions/Answers      Flowsheet Row Responses   Best Method for Contacting Cell   Support Person Present Yes   Does the patient have a car seat at the hospital Yes   Delivery Note Reviewed Reviewed   Were birth expectations met? Yes   Is there a need for additional support/resources? No   Lactation Note Reviewed Reviewed   Is additional support needed? No   Any questions or concerns? No   Is the patient going to use Meds to Beds? No   Any concerns related discharge meds/ability to  prescriptions? No   OB Discharge Navigator Reviewed  Not Reviewed   Comment no discharge order   Confirm Postpartum OB appointment Yes   Confirm initial well-child Pediatrician appointment date/time: No  [following up with Dr Sarmiento]   Additional post-discharge F/U appointments No   Does patient have transportation to appointments? Yes   Any other assistance needed to ensure she is able to attend appointments? No   Does patient have supplies needed at home for  care? Breast Pump, Clothing, Crib, Diapers          Darlene Hodge RN  Maternity Nurse Navigator    2024, 14:38 EST

## 2024-02-21 NOTE — PLAN OF CARE
Problem: Adult Inpatient Plan of Care  Goal: Plan of Care Review  Outcome: Ongoing, Progressing  Goal: Patient-Specific Goal (Individualized)  Outcome: Ongoing, Progressing  Goal: Absence of Hospital-Acquired Illness or Injury  Outcome: Ongoing, Progressing  Intervention: Identify and Manage Fall Risk  Recent Flowsheet Documentation  Taken 2/21/2024 1501 by Sydni Green RN  Safety Promotion/Fall Prevention: safety round/check completed  Taken 2/21/2024 1356 by Sydni Green RN  Safety Promotion/Fall Prevention: safety round/check completed  Taken 2/21/2024 1140 by Sydni Green RN  Safety Promotion/Fall Prevention: safety round/check completed  Taken 2/21/2024 1004 by Sydni Green RN  Safety Promotion/Fall Prevention: safety round/check completed  Taken 2/21/2024 0726 by Sydni Green RN  Safety Promotion/Fall Prevention: safety round/check completed  Intervention: Prevent and Manage VTE (Venous Thromboembolism) Risk  Recent Flowsheet Documentation  Taken 2/21/2024 1004 by Sydni Green RN  Activity Management: up ad nate  Goal: Optimal Comfort and Wellbeing  Outcome: Ongoing, Progressing  Goal: Readiness for Transition of Care  Outcome: Ongoing, Progressing     Problem: Adjustment to Role Transition (Postpartum Vaginal Delivery)  Goal: Successful Maternal Role Transition  Outcome: Ongoing, Progressing     Problem: Bleeding (Postpartum Vaginal Delivery)  Goal: Hemostasis  Outcome: Ongoing, Progressing     Problem: Infection (Postpartum Vaginal Delivery)  Goal: Absence of Infection Signs/Symptoms  Outcome: Ongoing, Progressing     Problem: Pain (Postpartum Vaginal Delivery)  Goal: Acceptable Pain Control  Outcome: Ongoing, Progressing     Problem: Urinary Retention (Postpartum Vaginal Delivery)  Goal: Effective Urinary Elimination  Outcome: Ongoing, Progressing     Problem: Breastfeeding  Goal: Effective Breastfeeding  Outcome: Ongoing, Progressing   Goal Outcome Evaluation:

## 2024-02-21 NOTE — PROGRESS NOTES
Postpartum Progress Note     PPD#1    Patsy Motley is a 33 y.o.  who is postpartum day #1. Patient is doing well and denies any complaints. Patient states pain is well controlled.  Patient states lochia is light. Patient denies any HA, vision changes, RUQ pain. Patient denies any postpartum depression or thoughts of harming herself or others. Patient is breastfeeding.       Vitals:    24 2044   BP: 123/77   Pulse: 84   Resp: 18   Temp: 98.2 °F (36.8 °C)         Physical Exam:  HEENT:Normocephalic and atraumatic, Thyroid WNL,   Lungs: CTA B/L, negative W/W/R   Abdomen: Soft, appropriate tenderness to palpation, Uterine fundus firm and below the umbilicus  Extremities: Negative swelling or cyanosis, DTRs WNL, negative clonus    Recent Results (from the past 24 hour(s))   CBC (No Diff)    Collection Time: 24  6:28 AM    Specimen: Blood   Result Value Ref Range    WBC 5.84 3.40 - 10.80 10*3/mm3    RBC 3.55 (L) 3.77 - 5.28 10*6/mm3    Hemoglobin 10.6 (L) 12.0 - 15.9 g/dL    Hematocrit 32.0 (L) 34.0 - 46.6 %    MCV 90.1 79.0 - 97.0 fL    MCH 29.9 26.6 - 33.0 pg    MCHC 33.1 31.5 - 35.7 g/dL    RDW 13.7 12.3 - 15.4 %    RDW-SD 44.5 37.0 - 54.0 fl    MPV 9.4 6.0 - 12.0 fL    Platelets 169 140 - 450 10*3/mm3   T Pallidum Antibody w/ reflex RPR    Collection Time: 24  6:28 AM    Specimen: Blood   Result Value Ref Range    Treponemal AB Total Non-Reactive Non-Reactive   Urine Drug Screen - Urine, Clean Catch    Collection Time: 24  6:28 AM    Specimen: Urine, Clean Catch   Result Value Ref Range    Amphet/Methamphet, Screen Negative Negative    Barbiturates Screen, Urine Negative Negative    Benzodiazepine Screen, Urine Negative Negative    Cocaine Screen, Urine Negative Negative    Opiate Screen Negative Negative    THC, Screen, Urine Negative Negative    Methadone Screen, Urine Negative Negative    Oxycodone Screen, Urine Negative Negative    Fentanyl, Urine Negative Negative   Type &  Screen    Collection Time: 24  6:28 AM    Specimen: Blood   Result Value Ref Range    ABO Type O     RH type Positive     Antibody Screen Negative     T&S Expiration Date 2024 11:59:59 PM    Blood Gas, Arterial, Cord    Collection Time: 24  6:58 AM    Specimen: Umbilical Cord; Cord Blood Arterial   Result Value Ref Range    pH, Cord Arterial 7.29 7.21 - 7.31 pH Units    pCO2, Cord Arterial 50.9 (H) 33.0 - 49.0 mmHg    Base Exc, Cord Arterial -3.2 (L) -2.0 - 2.0 mmol/L    pO2, Cord Arterial <40.5 mmHg    HCO3, Cord Arterial 23.9 mmol/L   ]    ASSESSMENT/PLAN:    Patient is a 33 y.o.female who is PPD# 1 s/p    -Rh+/Rubella immune    -Encourage ambulation    -breastfeeding    -Patient denies any complaints, continue postpartum care        Shirley Gonzalez DO  2024 01:06 EST

## 2024-02-21 NOTE — LACTATION NOTE
LC in to follow up with breastfeeding progress. LC noted that she continues to exclusively breastfeed. Baby's weight loss and output are wdl. No pain with breastfeeding per patient report. Patient is planning on discharge today. LC discussed normal infant output patterns to expect and unlimited time/access to the breast but if infant is not waking by 3 hours to wake and feed using measures shown in the hospital. LC discussed checking to make sure new medications are safe to breastfeed. LC discussed alcohol use and cigarette/second hand smoke around baby and breastfeeding and discussed the impact of street drugs on infants and breastfeeding. LC used the page in the breastfeeding guide to discuss harmful effects of these. Breastfeeding/Lactation expectations and anticipatory guidance discussed for the next two weeks . LC discussed nipple care, plugged ducts, engorgement, and breast infection. LC encouraged mom to see pediatrician two days from discharge for follow up. Patient has a breastpump for home use and LC discussed good pumping guidelines and normal expectations with pumping and storage and preparation of ebm for feedings. LC discussed breastfeeding/lactation resources including the local breastfeeding support group after discharge and when to call the doctor. Patient showed good understanding.

## 2024-02-21 NOTE — PLAN OF CARE
Problem: Breastfeeding  Goal: Effective Breastfeeding  Outcome: Ongoing, Progressing  Intervention: Support Exclusive Breastfeeding Success  Recent Flowsheet Documentation  Taken 2/20/2024 2120 by Petra Chauhan RN  Supportive Measures: positive reinforcement provided  Intervention: Promote Effective Breastfeeding  Recent Flowsheet Documentation  Taken 2/20/2024 2120 by Petra Chauhan RN  Parent/Child Attachment Promotion:   caring behavior modeled   cue recognition promoted   participation in care promoted   rooming-in promoted     Problem: Urinary Retention (Postpartum Vaginal Delivery)  Goal: Effective Urinary Elimination  Outcome: Ongoing, Progressing  Intervention: Promote Effective Urinary Elimination  Recent Flowsheet Documentation  Taken 2/20/2024 2120 by Petra Chauhan RN  Urinary Elimination Promotion: frequent voiding encouraged     Problem: Infection (Postpartum Vaginal Delivery)  Goal: Absence of Infection Signs/Symptoms  Outcome: Ongoing, Progressing  Intervention: Prevent or Manage Infection  Recent Flowsheet Documentation  Taken 2/20/2024 2120 by Petra Chauhan RN  Perineal Care: perineal spray bottle/warm water use encouraged     Problem: Adjustment to Role Transition (Postpartum Vaginal Delivery)  Goal: Successful Maternal Role Transition  Outcome: Ongoing, Progressing  Intervention: Support Maternal Role Transition  Recent Flowsheet Documentation  Taken 2/20/2024 2120 by Petra Chauhan RN  Supportive Measures: positive reinforcement provided  Parent/Child Attachment Promotion:   caring behavior modeled   cue recognition promoted   participation in care promoted   rooming-in promoted   Goal Outcome Evaluation:

## 2024-02-22 VITALS
SYSTOLIC BLOOD PRESSURE: 111 MMHG | RESPIRATION RATE: 16 BRPM | HEART RATE: 79 BPM | WEIGHT: 142 LBS | BODY MASS INDEX: 26.13 KG/M2 | DIASTOLIC BLOOD PRESSURE: 67 MMHG | TEMPERATURE: 98.1 F | HEIGHT: 62 IN

## 2024-02-22 PROBLEM — Z34.90 ENCOUNTER FOR INDUCTION OF LABOR: Status: RESOLVED | Noted: 2024-02-20 | Resolved: 2024-02-22

## 2024-02-22 PROCEDURE — 0503F POSTPARTUM CARE VISIT: CPT | Performed by: OBSTETRICS & GYNECOLOGY

## 2024-02-22 RX ORDER — ACETAMINOPHEN 325 MG/1
650 TABLET ORAL EVERY 6 HOURS PRN
Qty: 30 TABLET | Refills: 1 | Status: SHIPPED | OUTPATIENT
Start: 2024-02-22

## 2024-02-22 RX ORDER — IBUPROFEN 600 MG/1
600 TABLET ORAL EVERY 6 HOURS PRN
Qty: 30 TABLET | Refills: 1 | Status: SHIPPED | OUTPATIENT
Start: 2024-02-22

## 2024-02-22 RX ADMIN — DOCUSATE SODIUM 100 MG: 100 CAPSULE, LIQUID FILLED ORAL at 11:04

## 2024-02-22 NOTE — PROGRESS NOTES
"PostPartum/PostOp PROGRESS NOTE        Subjective:  Patient has no complaints  Pain not controlled  Tolerating a regular diet  Passing flatus  Ambulating  Urinating spontaneously  Lochia decreasing, no bleeding concerns  Denies HA, vision change, or RUQ/epigastric pain  Desires DC home if baby Dc'd    Objective:  Vitals: Blood pressure 111/67, pulse 79, temperature 98.1 °F (36.7 °C), temperature source Oral, resp. rate 16, height 157.5 cm (62\"), weight 64.4 kg (142 lb), last menstrual period 2023, currently breastfeeding.          General: NAD, alert and oriented, appropriate  Psych: Normal mood, appropriate  Abdomen: Fundus firm, non tender and Fundus at U-2  Extremeties: No edema    Labs:  Lab Results (last 24 hours)       ** No results found for the last 24 hours. **              Assessment:    Post-partum/postop Day:  2  Status post       Plan:   Routine postpartum/postop care  Breast feeding support, Discharge home, DC meds reviewed, Follow up scheduled      Electronically signed by Vivi Ramirez DO, 24, 9:46 AM EST.  "

## 2024-02-22 NOTE — LACTATION NOTE
Lc in to follow up with breastfeeding progress. Patient continues to exclusively breastfeed. Mom states baby is not latching well since last night. LC noted that baby may have been cluster feeding but this am is not showing any cluster feeding and still not latching. LC introduced  a nipple shield and baby latched easily to this. It was removed and baby would not latch so at this feeding a baby required the nipple shield to latch. LC discussed appropriate ways to use this and if it becomes difficult to wean off of it to call for lactation support as an outpatient to assist with this.

## 2024-02-22 NOTE — DISCHARGE SUMMARY
"             [facility]         DISCHARGE SUMMARY    Patient Name: Patsy Motley  : 1990  MRN: [id number]    Date of Admission: 2024  Date of Discharge:  2024   Primary Care Physician: Provider, No Known    Consults       No orders found from 2024 to 2024.             Procedures:      Presenting Problem:   Uterine Contractions   Active Labor    Admitting Diagnosis:  IUP @ 39 weeks  Uterine Contractions   Active Labor    Discharge Diagnosis:  Same    Delivery Summary     OB Surgeon:  Vivi Ramirez DO  Anesthesia: None  Delivery Type:   Perineum: OBPERINEUM: Intact  Feeding method: Bottle    Infant: female  infant;    Weight: 3570 g (7 lb 13.9 oz)     APGARS: 8  @ 1 minute / 8  @ 5 minutes   Venous Blood Gas: No results found for: \"PHCVEN\", \"BECVEN\"   Arterial Blood Gas:   pH, Cord Arterial   Date Value Ref Range Status   2024 7.29 7.21 - 7.31 pH Units Final     Base Exc, Cord Arterial   Date Value Ref Range Status   2024 -3.2 (L) -2.0 - 2.0 mmol/L Final        Hospital Course     Hospital Course:  Patsy Motley is a 33 y.o.  39w0d who presented on 2024 in active labor.  She arrived on labor and delivery and was 6 cm.  She rapidly progressed to complete and underwent an uneventful vaginal delivery.  She was group B strep positive but her labor progressed so quickly she was unable to receive antibiotics.  Her post partum course was benign.  She was breastfeeding without difficulty.  She remained afebrile and was ready for discharge on PPD#2.    Day of Discharge     Vital Signs:  Temp:  [97.9 °F (36.6 °C)-98.1 °F (36.7 °C)] 98.1 °F (36.7 °C)  Heart Rate:  [73-83] 79  Resp:  [16-17] 16  BP: (111-116)/(67-77) 111/67    Pertinent  and/or Most Recent Results     LAB RESULTS:       Lab 24   WBC 5.84   HEMOGLOBIN 10.6*   HEMATOCRIT 32.0*   PLATELETS 169   MCV 90.1                 Lab 2428   ABO TYPING O   RH TYPING Positive "   ANTIBODY SCREEN Negative     URINALYSIS@  Microbiology Results (last 10 days)       ** No results found for the last 240 hours. **         Results for orders placed during the hospital encounter of 05/17/22    Duplex Venous Lower Extremity - Bilateral CAR    Interpretation Summary  · Normal bilateral lower extremity venous duplex scan.  Results for orders placed during the hospital encounter of 05/17/22    Duplex Venous Lower Extremity - Bilateral CAR    Interpretation Summary  · Normal bilateral lower extremity venous duplex scan.        Discharge Details        Discharge Medications        New Medications        Instructions Start Date   acetaminophen 325 MG tablet  Commonly known as: TYLENOL   650 mg, Oral, Every 6 Hours PRN      ibuprofen 600 MG tablet  Commonly known as: ADVIL,MOTRIN   600 mg, Oral, Every 6 Hours PRN             Continue These Medications        Instructions Start Date   docusate sodium 100 MG capsule  Commonly known as: Colace   100 mg, Oral, 2 Times Daily PRN      ferrous sulfate 325 (65 FE) MG tablet   325 mg, Oral, Every Other Day      Prenatal Vitamin 27-0.8 MG tablet   1 tablet, Oral, Daily             Stop These Medications      Alcohol Swabs pads     aspirin 81 MG EC tablet     famotidine 20 MG tablet  Commonly known as: PEPCID     freestyle lancets     FREESTYLE LITE test strip  Generic drug: glucose blood     FreeStyle Lite w/Device kit     glucose blood test strip     glucose monitor monitoring kit     Lancets misc              No Known Allergies    Discharge Disposition:   Home, self-care    Discharge Condition:  Good    Diet:   Regular    Discharge Activity:   Activity Instructions       Driving Restrictions      Type of Restriction: Driving    Driving Restrictions: No Driving (Time Limited)    Length: 1 Week    Pelvic Rest      Number of Days: 42    Sexual Activity Restrictions      Type of Restriction: Sex    Explain Sexual Activity Restrictions: x 6 weeks             Follow  Up:  Future Appointments   Date Time Provider Department Center   3/27/2024  1:00 PM Shirley Gonzalez DO [facility]       Electronically signed by Viiv Ramirez DO, 02/22/24, 9:49 AM EST.

## 2024-02-22 NOTE — PLAN OF CARE
Problem: Adult Inpatient Plan of Care  Goal: Plan of Care Review  Outcome: Ongoing, Progressing  Goal: Patient-Specific Goal (Individualized)  Outcome: Ongoing, Progressing  Goal: Absence of Hospital-Acquired Illness or Injury  Outcome: Ongoing, Progressing  Intervention: Identify and Manage Fall Risk  Recent Flowsheet Documentation  Taken 2/22/2024 0400 by Shira Nevarez RN  Safety Promotion/Fall Prevention: safety round/check completed  Taken 2/22/2024 0300 by Shira Nevarez RN  Safety Promotion/Fall Prevention: safety round/check completed  Taken 2/22/2024 0200 by Shira Nevarez RN  Safety Promotion/Fall Prevention: safety round/check completed  Taken 2/22/2024 0100 by Shira Nevarez RN  Safety Promotion/Fall Prevention: safety round/check completed  Taken 2/21/2024 2300 by Shira Nevarez RN  Safety Promotion/Fall Prevention: safety round/check completed  Taken 2/21/2024 2200 by Shira Nevarez RN  Safety Promotion/Fall Prevention: safety round/check completed  Taken 2/21/2024 2100 by Shira Nevarez RN  Safety Promotion/Fall Prevention: safety round/check completed  Taken 2/21/2024 2028 by Shira Nevarez RN  Safety Promotion/Fall Prevention: safety round/check completed  Taken 2/21/2024 1922 by Shira Nevarez RN  Safety Promotion/Fall Prevention: safety round/check completed  Intervention: Prevent and Manage VTE (Venous Thromboembolism) Risk  Recent Flowsheet Documentation  Taken 2/21/2024 2139 by Shira Nevarez, RN  Activity Management: up ad nate  Taken 2/21/2024 2028 by Shira Nevarez RN  Activity Management: up ad nate  VTE Prevention/Management: sequential compression devices off  Goal: Optimal Comfort and Wellbeing  Outcome: Ongoing, Progressing  Intervention: Provide Person-Centered Care  Recent Flowsheet Documentation  Taken 2/21/2024 2028 by Shira Nevarez, RN  Trust Relationship/Rapport:   care explained   choices provided   emotional support provided   empathic listening provided   questions answered    questions encouraged   reassurance provided   thoughts/feelings acknowledged  Goal: Readiness for Transition of Care  Outcome: Ongoing, Progressing     Problem: Adjustment to Role Transition (Postpartum Vaginal Delivery)  Goal: Successful Maternal Role Transition  Outcome: Ongoing, Progressing     Problem: Bleeding (Postpartum Vaginal Delivery)  Goal: Hemostasis  Outcome: Ongoing, Progressing     Problem: Infection (Postpartum Vaginal Delivery)  Goal: Absence of Infection Signs/Symptoms  Outcome: Ongoing, Progressing     Problem: Pain (Postpartum Vaginal Delivery)  Goal: Acceptable Pain Control  Outcome: Ongoing, Progressing     Problem: Urinary Retention (Postpartum Vaginal Delivery)  Goal: Effective Urinary Elimination  Outcome: Ongoing, Progressing     Problem: Breastfeeding  Goal: Effective Breastfeeding  Outcome: Ongoing, Progressing   Goal Outcome Evaluation:

## 2024-02-24 ENCOUNTER — TELEPHONE (OUTPATIENT)
Dept: LACTATION | Facility: HOSPITAL | Age: 34
End: 2024-02-24
Payer: MEDICAID

## 2024-02-24 NOTE — TELEPHONE ENCOUNTER
Pt states baby has struggled some with latching and with nipple shield so she is just pumping for now. She is getting about 1.5-3oz each pumping. Baby is taking 1.5-2oz each feeding. She states her last baby struggled some at first but then was able to latch well and  until this baby was born, encouraged her to reach out to LC as needed.

## 2024-02-29 ENCOUNTER — PATIENT OUTREACH (OUTPATIENT)
Dept: LABOR AND DELIVERY | Facility: HOSPITAL | Age: 34
End: 2024-02-29
Payer: MEDICAID

## 2024-02-29 NOTE — OUTREACH NOTE
Motherhood Connection  Postpartum Check-In    Questions/Answers      Flowsheet Row Responses   Visit Setting Telephone   Best Method for Contacting Cell   OB Discharge Note Reviewed  Reviewed   OB Discharge Navigator Reviewed  Reviewed   OB Discharge Medications Reviewed  Reviewed    discharged home with mother? Yes   Current Pain Levels 0-10 3   At Rest Pain Levels 0-10 3   Pain level with activity 0-10 3   Acceptable Pain Level 0-10 3   Verbalized Emotional State Acceptance   Family/Support Network Family, Significant Other   Level of Involvement in Care Attentive, Interactive, Supportive   Do you feel comfortable in your relationship with your baby? Yes   Have members of your household adjusted to your baby? Yes   Is the baby's father supportive and/or involved with the baby? Yes   How does your partner feel about the baby? Happy, Involved   Do you feel safe at home, school and work? Yes   Are you in a relationship with someone who threatens you or hurts you? No   Do you have the resources to keep yourself and your baby healthy and safe? Yes   Lochia (per patient report) Similar to Menstrual Flow   Amount Spotting   Number of pads per day 3   Lochia Odor None   Is patient breastfeeding? Yes, pumping   Frequency TID to QID   Pumping Quantity 4-8oz   Postpartum Depression Screening Education Education Provided   Doctor Appointments: Education Provided   Breastfeeding Education Education Provided   Family Planning Education Education Provided   Postpartum Care Education Education Provided   S & S to report Education Provided   Followup Appointments Made Yes   Well Child Visit Appointments Made Yes   Did you complete the visit? Yes   Were there any specific concerns? No   Umbilical Cord No reported signs or symptoms   Infant Feeding Method Breast, Expressed Breast Milk   Frequency of feedings ad nate   Expressed milk PO (mL) 4oz   Expressed milk- frequency of feedings PRN   Number of wet diapers x 24 hours 10    Last BM x 24 hours 8   What safe sleep surface is available? Bassinet   Are there stuffed animals, toys, pillows, quilts, blankets, wedges, positioners, bumpers or other loose bedding in the infant's sleeping environment? No   Where does the baby usually sleep? Bassinet   Does the baby ever share a sleep surface with a sibling, adult or pet? No   Does the baby ever share a sleep surface in a bed, couch, recliner or other? No   What position do you place your baby to sleep for naps? Back   What position do you place your baby to sleep at night Back   Are you and/or other caregivers smoking inside or outside the baby's home? No   Is the infant dressed appropiately for the temperature of the home? Yes   Do you use a clean, dry pacifier that is not attached to a string or stuffed animal? Yes            Review of Systems    Most Recent Sharon  Depression Scale Score (EPDS)    Performed by a clinician: 0 (2024  3:08 PM)    C/o some lower hip pain, encouraged to speak with OB at postpartum visit about possible pelvic physical therapy if it continues. States infant is not latching well. Is working with lactation. No other questions, needs or concerns.     5 Ps Screen  complete      Darlene Hodge RN  Maternity Nurse Navigator    2024, 15:09 EST

## 2024-03-07 ENCOUNTER — PATIENT OUTREACH (OUTPATIENT)
Dept: CALL CENTER | Facility: HOSPITAL | Age: 34
End: 2024-03-07
Payer: MEDICAID

## 2024-03-07 NOTE — OUTREACH NOTE
Motherhood Connection Survey      Flowsheet Row Responses   [facility] patient discharged from? [facility]   Week 1 attempt successful? Yes   Call start time 1607   Call end time 1612   Baby sex Girl    discharged home with mother? Yes   Baby sex Girl   Delivery type Vaginal   Emotional state Acceptance   Family support Yes   Do you have all necessary resources to care for you and your baby?  Yes   Have members of your household adjusted to your baby? Yes   Lochia amount Light   Lochia per patient report Rubra   Feeding Method Combination   Frequency q 3-4 hrs   Duration 10/10 min/fdg   Pumping Yes   Supplementing Breast Milk   Frequency intermittent   Amount 4 oz   Nursing Interventions Lactation education provided   Number of wet diapers x 24 hours 7   Last BM x 24 hours 5   Umbilical Cord No reported signs or symptoms   Umbilical cord comments cord off   Where does the baby usually sleep? Bassinet   Are there stuffed animals, toys, pillows, quilts, blankets, wedges, positioners, bumpers or other loose bedding in the infant's sleeping environment? No   Does the baby ever share a sleep surface in a bed, couch, recliner or other? No   What position do you lay your baby down to sleep? Back   Are you and/or other caregivers smoking inside or outside the baby's home? No   Mom appointment comments: pp f/u scheduled   Baby appointment comments: Peds visit x1   Call completed? Yes   How satisfied were you with the Motherhood Connection Program? 5              Elizabeth HANSEN - Registered Nurse

## 2024-04-01 ENCOUNTER — TELEPHONE (OUTPATIENT)
Dept: OBSTETRICS AND GYNECOLOGY | Facility: CLINIC | Age: 34
End: 2024-04-01
Payer: MEDICAID

## 2024-04-15 NOTE — PROGRESS NOTES
POSTPARTUM Follow Up Visit      Chief Complaint   Patient presents with    Postpartum Care     HPI:      Date of delivery: 24  Delivery type:            Perineum : Intact  Delivering Provider:   Dr. Vivi Ramirez      Feeding: Breast  Pain:  No  Vaginal Bleeding:  No  Depressed/Anxious:  No  EPDS score: 0   #10: 0  Plans for BC:  Desires to discuss options  Last pap date and result: Normal, 21      EPD Scale: Thought of Harming Self: 0-->never  [place]  Depression Score: 0        PHYSICAL EXAM:  /72   Wt 58.1 kg (128 lb)   LMP 2023   Breastfeeding Yes   BMI 23.41 kg/m²  7.711 kg (17 lb)  General- NAD, alert and oriented, appropriate  Psych- Normal mood, good memory, good eye contact  Abdomen- Soft, non distended, non tender, no masses  Lymphatic- No palpable groin nodes  Ext- No edema  Pelvic: declined     ASSESSMENT AND PLAN:  Diagnoses and all orders for this visit:    1. Postpartum follow-up (Primary)  -     norethindrone (MICRONOR) 0.35 MG tablet; Take 1 tablet by mouth Daily.  Dispense: 28 tablet; Refill: 12      Counseling:    All birth control options reviewed in detail.  R/B/A/SE/E of each wrt pts PMHx and prior BC use  May resume intercourse  May resume normal activities  Core strengthening exercises reviewed and recommended  Kegel exercises reviewed and recommended  Ok to return to work/school      Follow Up:  Return in about 1 year (around 2025) for Annual exam.    I spent 20 minutes on the separately reported service of postpartum. This time is not included in the time used to support the E/M service also reported today.    Shirley Gonzalez DO  2024    [facility]  [address] [place] [address]  Dept: [phone]  Dept Fax: [phone]  Loc: [phone]  Loc Fax: [phone]

## 2024-04-16 ENCOUNTER — POSTPARTUM VISIT (OUTPATIENT)
Dept: OBSTETRICS AND GYNECOLOGY | Facility: CLINIC | Age: 34
End: 2024-04-16
Payer: MEDICAID

## 2024-04-16 VITALS — SYSTOLIC BLOOD PRESSURE: 110 MMHG | WEIGHT: 128 LBS | DIASTOLIC BLOOD PRESSURE: 72 MMHG | BODY MASS INDEX: 23.41 KG/M2

## 2024-04-16 PROCEDURE — 0503F POSTPARTUM CARE VISIT: CPT | Performed by: OBSTETRICS & GYNECOLOGY

## 2024-04-16 RX ORDER — ACETAMINOPHEN AND CODEINE PHOSPHATE 120; 12 MG/5ML; MG/5ML
1 SOLUTION ORAL DAILY
Qty: 28 TABLET | Refills: 12 | Status: SHIPPED | OUTPATIENT
Start: 2024-04-16 | End: 2025-04-16

## 2024-08-13 ENCOUNTER — TELEPHONE (OUTPATIENT)
Dept: OBSTETRICS AND GYNECOLOGY | Facility: CLINIC | Age: 34
End: 2024-08-13
Payer: MEDICAID

## 2024-08-13 DIAGNOSIS — O36.80X0 PREGNANCY WITH INCONCLUSIVE FETAL VIABILITY, SINGLE OR UNSPECIFIED FETUS: Primary | ICD-10-CM

## 2024-08-16 ENCOUNTER — HOSPITAL ENCOUNTER (OUTPATIENT)
Dept: ULTRASOUND IMAGING | Facility: HOSPITAL | Age: 34
Discharge: HOME | End: 2024-08-16
Payer: MEDICAID

## 2024-08-16 DIAGNOSIS — O36.80X0 PREGNANCY WITH INCONCLUSIVE FETAL VIABILITY, SINGLE OR UNSPECIFIED FETUS: ICD-10-CM

## 2024-08-16 PROCEDURE — 76817 TRANSVAGINAL US OBSTETRIC: CPT

## 2024-08-19 ENCOUNTER — INITIAL PRENATAL (OUTPATIENT)
Dept: OBSTETRICS AND GYNECOLOGY | Facility: CLINIC | Age: 34
End: 2024-08-19
Payer: MEDICAID

## 2024-08-19 VITALS — DIASTOLIC BLOOD PRESSURE: 77 MMHG | WEIGHT: 131 LBS | SYSTOLIC BLOOD PRESSURE: 123 MMHG | BODY MASS INDEX: 23.96 KG/M2

## 2024-08-19 DIAGNOSIS — Z86.32 HISTORY OF GESTATIONAL DIABETES IN PRIOR PREGNANCY, CURRENTLY PREGNANT: ICD-10-CM

## 2024-08-19 DIAGNOSIS — O09.899 SHORT INTERVAL BETWEEN PREGNANCIES AFFECTING PREGNANCY, ANTEPARTUM: ICD-10-CM

## 2024-08-19 DIAGNOSIS — Z34.80 SUPERVISION OF OTHER NORMAL PREGNANCY, ANTEPARTUM: Primary | ICD-10-CM

## 2024-08-19 DIAGNOSIS — O09.529 ANTEPARTUM MULTIGRAVIDA OF ADVANCED MATERNAL AGE: ICD-10-CM

## 2024-08-19 DIAGNOSIS — O09.299 HISTORY OF GESTATIONAL DIABETES IN PRIOR PREGNANCY, CURRENTLY PREGNANT: ICD-10-CM

## 2024-08-19 PROBLEM — O24.410 DIET CONTROLLED GESTATIONAL DIABETES MELLITUS (GDM), ANTEPARTUM: Status: RESOLVED | Noted: 2023-12-18 | Resolved: 2024-08-19

## 2024-08-19 PROBLEM — O47.9 UTERINE CONTRACTIONS: Status: RESOLVED | Noted: 2024-02-20 | Resolved: 2024-08-19

## 2024-08-19 PROBLEM — O99.019 MATERNAL ANEMIA IN PREGNANCY, ANTEPARTUM: Status: RESOLVED | Noted: 2023-08-04 | Resolved: 2024-08-19

## 2024-08-19 LAB
ABO GROUP BLD: NORMAL
AMPHET+METHAMPHET UR QL: NEGATIVE
B-HCG UR QL: POSITIVE
BARBITURATES UR QL SCN: NEGATIVE
BASOPHILS # BLD AUTO: 0.01 10*3/MM3 (ref 0–0.2)
BASOPHILS NFR BLD AUTO: 0.2 % (ref 0–1.5)
BENZODIAZ UR QL SCN: NEGATIVE
BLD GP AB SCN SERPL QL: NEGATIVE
CANNABINOIDS SERPL QL: POSITIVE
COCAINE UR QL: NEGATIVE
DEPRECATED RDW RBC AUTO: 37.8 FL (ref 37–54)
EOSINOPHIL # BLD AUTO: 0.05 10*3/MM3 (ref 0–0.4)
EOSINOPHIL NFR BLD AUTO: 0.8 % (ref 0.3–6.2)
ERYTHROCYTE [DISTWIDTH] IN BLOOD BY AUTOMATED COUNT: 11.8 % (ref 12.3–15.4)
EXPIRATION DATE: ABNORMAL
FENTANYL UR-MCNC: NEGATIVE NG/ML
GLUCOSE UR STRIP-MCNC: NEGATIVE MG/DL
HBV SURFACE AG SERPL QL IA: NORMAL
HCT VFR BLD AUTO: 35.7 % (ref 34–46.6)
HCV AB SER QL: NORMAL
HGB BLD-MCNC: 12.1 G/DL (ref 12–15.9)
HIV 1+2 AB+HIV1 P24 AG SERPL QL IA: NORMAL
IMM GRANULOCYTES # BLD AUTO: 0.02 10*3/MM3 (ref 0–0.05)
IMM GRANULOCYTES NFR BLD AUTO: 0.3 % (ref 0–0.5)
INTERNAL NEGATIVE CONTROL: NEGATIVE
INTERNAL POSITIVE CONTROL: ABNORMAL
LYMPHOCYTES # BLD AUTO: 1.12 10*3/MM3 (ref 0.7–3.1)
LYMPHOCYTES NFR BLD AUTO: 17.5 % (ref 19.6–45.3)
Lab: ABNORMAL
MCH RBC QN AUTO: 30 PG (ref 26.6–33)
MCHC RBC AUTO-ENTMCNC: 33.9 G/DL (ref 31.5–35.7)
MCV RBC AUTO: 88.4 FL (ref 79–97)
METHADONE UR QL SCN: NEGATIVE
MONOCYTES # BLD AUTO: 0.36 10*3/MM3 (ref 0.1–0.9)
MONOCYTES NFR BLD AUTO: 5.6 % (ref 5–12)
NEUTROPHILS NFR BLD AUTO: 4.85 10*3/MM3 (ref 1.7–7)
NEUTROPHILS NFR BLD AUTO: 75.6 % (ref 42.7–76)
NRBC BLD AUTO-RTO: 0 /100 WBC (ref 0–0.2)
OPIATES UR QL: NEGATIVE
OXYCODONE UR QL SCN: NEGATIVE
PLATELET # BLD AUTO: 236 10*3/MM3 (ref 140–450)
PMV BLD AUTO: 9 FL (ref 6–12)
PROT UR STRIP-MCNC: NEGATIVE MG/DL
RBC # BLD AUTO: 4.04 10*6/MM3 (ref 3.77–5.28)
RH BLD: POSITIVE
RPR SER QL: NORMAL
WBC NRBC COR # BLD AUTO: 6.41 10*3/MM3 (ref 3.4–10.8)

## 2024-08-19 PROCEDURE — 87491 CHLMYD TRACH DNA AMP PROBE: CPT | Performed by: NURSE PRACTITIONER

## 2024-08-19 PROCEDURE — 81025 URINE PREGNANCY TEST: CPT | Performed by: NURSE PRACTITIONER

## 2024-08-19 PROCEDURE — 80307 DRUG TEST PRSMV CHEM ANLYZR: CPT | Performed by: NURSE PRACTITIONER

## 2024-08-19 PROCEDURE — 87591 N.GONORRHOEAE DNA AMP PROB: CPT | Performed by: NURSE PRACTITIONER

## 2024-08-19 PROCEDURE — 86803 HEPATITIS C AB TEST: CPT | Performed by: NURSE PRACTITIONER

## 2024-08-19 PROCEDURE — 87086 URINE CULTURE/COLONY COUNT: CPT | Performed by: NURSE PRACTITIONER

## 2024-08-19 PROCEDURE — 99214 OFFICE O/P EST MOD 30 MIN: CPT | Performed by: NURSE PRACTITIONER

## 2024-08-19 PROCEDURE — 87624 HPV HI-RISK TYP POOLED RSLT: CPT | Performed by: NURSE PRACTITIONER

## 2024-08-19 PROCEDURE — 87661 TRICHOMONAS VAGINALIS AMPLIF: CPT | Performed by: NURSE PRACTITIONER

## 2024-08-19 PROCEDURE — G0123 SCREEN CERV/VAG THIN LAYER: HCPCS | Performed by: NURSE PRACTITIONER

## 2024-08-19 PROCEDURE — 80081 OBSTETRIC PANEL INC HIV TSTG: CPT | Performed by: NURSE PRACTITIONER

## 2024-08-19 NOTE — PROGRESS NOTES
OB Initial Visit    CC- Here for care of current pregnancy, first visit    Subjective:  34 y.o.  presenting for her first obstetrical visit.    LMP: Patient's last menstrual period was 06/10/2024.     Pt has no complaints, is doing well    Happy for pregnancy    Reviewed and updated:  OBHx, GYNHx (STDs), PMHx, Medications, Allergies, PSHx, Social Hx, Preventative Hx (PAP), Hx of abuse/safe environment, Vaccine Hx including hx of chickenpox or vaccine, Genetic Hx (pt, FOB, both families).        Objective:  /77   Wt 59.4 kg (131 lb)   LMP 06/10/2024   BMI 23.96 kg/m²        General- NAD, alert and oriented, appropriate  Psych- Normal mood, good memory  Neck- No masses, no thyroid enlargement  CV- Regular rhythm, no murnurs  Resp- CTA to bases, no wheezes  Abdomen- Soft, non distended, non tender, no masses    Breast left- deferred  Breast right- deferred    External genitalia- Normal, no lesions  Urethra- Normal, no masses, non tender  Vagina- Normal, no discharge  Bladder- Normal, no masses, non tender  Cvx- Normal, no lesions, no discharge, no CMT  Uterus- Normal shape and consistency, non tender, Consistent with dates, Bedside US consistent with dates.  -160..  Brief TAS shows viable IUP, +cardiac motion  Adnexa- Normal, no mass, non tender    Lymphatic- No palpable neck, axillary, or groin nodes  Ext- No edema, no cyanosis    Skin- No lesions, no rashes, no acanthosis nigricans    Assessment and Plan:  10w0d  Diagnoses and all orders for this visit:    1. Supervision of other normal pregnancy, antepartum (Primary)  Overview:  EVERETT finalized: 3/17/25 per LMP, dating scan pending    Genetic testing (NIPS-Quad)/CF/AFP:  CF negative, NIPS pending    COVID: Recommended 24  Flu: Recommended 24  Tdap:   RSV:      Rhogam: Opos  ? Desires Sterilization:    Anatomy US:   FU US:  PROBLEM LIST/PLAN:   Hx of GDM - diet controlled, recommend early 1hGTT    AMA - NIPS pending, MFM consult prn,  plan NSTs beginning 32-36 weeks    Short interval pregnancy - monitor for  labor        Orders:  -     POC Urinalysis Dipstick  -     IGP,CtNgTv,Apt HPV,rfx 16 / 18,45  -     OB Panel With HIV and RPR  -     Urine Culture - Urine, Urine, Clean Catch  -     Urine Drug Screen - Urine, Clean Catch  -     POC Pregnancy, Urine    2. Short interval between pregnancies affecting pregnancy, antepartum  Overview:  Monitor for  labor      3. Antepartum multigravida of advanced maternal age  Overview:  NIPS pending  MFM consult PRN  Plan NSTs beginning 32-36 weeks    Orders:  -     [facility] Panorama Prenatal Test: Chromosomes 13, 18, 21, X & Y: Triploidy 22Q.11.2 Deletion - Blood,    4. History of gestational diabetes in prior pregnancy, currently pregnant  Overview:  Hx of GDM, diet controlled.  Recommend early 1hGTT          Genetic Screening:   NIPS    Vaccines:   Recommend FLU vaccine this season, R/B discussed  Recommend COVID vaccine, R/B discussed    Counseling:   Nutrition discussed, calories, activity/exercise in pregnancy  Discussed dietary restrictions/safety food preparation in pregnancy  Reviewed what to expect prenatal visits, office providers (female and male) and covering [facility] Hospitalists/Dr. Montes De Oca  Appropriate trimester precautions provided, N/V, vag bleeding, cramping  VACCINE importance in pregnancy discussed.  Maternal and fetal risk of not being vaccinated reviewed NLT increased risk maternal/fetal severity of illness/death, PTD, CS, hemorrhage, HTN, possible IUFD.  Significant maternal and fetal/infant benefit w vaccination.  FDA approval and ACOG/SMFM/CDC strong recommendation in pregnancy.  Questions answered.   Questions answered    Labs:   Prenatal labs, cultures, and PAP performed (prn)    Return in about 4 weeks (around 2024) for [facility] Office, OB follow up, early 1hGTT.      Cm Marie, APRN  2024    [facility] [address]  [facility]  [place]  [address] [place] [address] [place] [address]  Dept: [phone]  Dept Fax: [phone]  Loc: [phone]

## 2024-08-20 PROBLEM — R82.5 POSITIVE URINE DRUG SCREEN: Status: ACTIVE | Noted: 2024-08-20

## 2024-08-21 LAB
BACTERIA SPEC AEROBE CULT: NO GROWTH
RUBV IGG SERPL IA-ACNC: 7.37 INDEX

## 2024-08-23 LAB
C TRACH RRNA CVX QL NAA+PROBE: NEGATIVE
CYTOLOGIST CVX/VAG CYTO: NORMAL
CYTOLOGY CVX/VAG DOC CYTO: NORMAL
CYTOLOGY CVX/VAG DOC THIN PREP: NORMAL
DX ICD CODE: NORMAL
HPV GENOTYPE REFLEX: NORMAL
HPV I/H RISK 4 DNA CVX QL PROBE+SIG AMP: NEGATIVE
Lab: NORMAL
N GONORRHOEA RRNA CVX QL NAA+PROBE: NEGATIVE
OTHER STN SPEC: NORMAL
STAT OF ADQ CVX/VAG CYTO-IMP: NORMAL
T VAGINALIS RRNA SPEC QL NAA+PROBE: NEGATIVE

## 2024-09-10 ENCOUNTER — REFERRAL TRIAGE (OUTPATIENT)
Dept: LABOR AND DELIVERY | Facility: HOSPITAL | Age: 34
End: 2024-09-10
Payer: MEDICAID

## 2024-09-23 ENCOUNTER — TELEPHONE (OUTPATIENT)
Dept: OBSTETRICS AND GYNECOLOGY | Facility: CLINIC | Age: 34
End: 2024-09-23
Payer: MEDICAID

## 2024-09-27 ENCOUNTER — ROUTINE PRENATAL (OUTPATIENT)
Dept: OBSTETRICS AND GYNECOLOGY | Facility: CLINIC | Age: 34
End: 2024-09-27
Payer: MEDICAID

## 2024-09-27 VITALS — DIASTOLIC BLOOD PRESSURE: 66 MMHG | SYSTOLIC BLOOD PRESSURE: 102 MMHG | BODY MASS INDEX: 25.42 KG/M2 | WEIGHT: 139 LBS

## 2024-09-27 DIAGNOSIS — Z34.80 SUPERVISION OF OTHER NORMAL PREGNANCY, ANTEPARTUM: Primary | ICD-10-CM

## 2024-09-27 DIAGNOSIS — O09.529 ANTEPARTUM MULTIGRAVIDA OF ADVANCED MATERNAL AGE: ICD-10-CM

## 2024-09-27 LAB
GLUCOSE UR STRIP-MCNC: NEGATIVE MG/DL
PROT UR STRIP-MCNC: NEGATIVE MG/DL

## 2024-09-27 PROCEDURE — 82950 GLUCOSE TEST: CPT | Performed by: OBSTETRICS & GYNECOLOGY

## 2024-09-27 RX ORDER — BACLOFEN 20 MG
TABLET ORAL
COMMUNITY

## 2024-09-30 DIAGNOSIS — R73.09 ABNORMAL GLUCOSE TOLERANCE TEST: Primary | ICD-10-CM

## 2024-10-04 RX ORDER — BLOOD PRESSURE TEST KIT
KIT MISCELLANEOUS
Qty: 120 EACH | Refills: 3 | Status: SHIPPED | OUTPATIENT
Start: 2024-10-04

## 2024-10-04 RX ORDER — LANCETS 30 GAUGE
120 EACH MISCELLANEOUS 4 TIMES DAILY
Qty: 120 EACH | Refills: 3 | Status: SHIPPED | OUTPATIENT
Start: 2024-10-04

## 2024-10-04 RX ORDER — BLOOD-GLUCOSE METER
KIT MISCELLANEOUS
Qty: 1 EACH | Refills: 0 | Status: SHIPPED | OUTPATIENT
Start: 2024-10-04

## 2024-10-14 ENCOUNTER — TRANSCRIBE ORDERS (OUTPATIENT)
Dept: ULTRASOUND IMAGING | Facility: HOSPITAL | Age: 34
End: 2024-10-14
Payer: MEDICAID

## 2024-10-14 DIAGNOSIS — O09.529 ANTEPARTUM MULTIGRAVIDA OF ADVANCED MATERNAL AGE: Primary | ICD-10-CM

## 2024-10-18 ENCOUNTER — HOSPITAL ENCOUNTER (OUTPATIENT)
Dept: ULTRASOUND IMAGING | Facility: HOSPITAL | Age: 34
Discharge: HOME | End: 2024-10-18
Payer: MEDICAID

## 2024-10-18 ENCOUNTER — OFFICE VISIT (OUTPATIENT)
Dept: OBSTETRICS AND GYNECOLOGY | Facility: CLINIC | Age: 34
End: 2024-10-18
Payer: MEDICAID

## 2024-10-18 VITALS
BODY MASS INDEX: 25.98 KG/M2 | DIASTOLIC BLOOD PRESSURE: 58 MMHG | HEIGHT: 62 IN | HEART RATE: 94 BPM | TEMPERATURE: 98 F | SYSTOLIC BLOOD PRESSURE: 115 MMHG | WEIGHT: 141.2 LBS

## 2024-10-18 DIAGNOSIS — O09.522 MULTIGRAVIDA OF ADVANCED MATERNAL AGE IN SECOND TRIMESTER: Primary | ICD-10-CM

## 2024-10-18 DIAGNOSIS — O09.529 ANTEPARTUM MULTIGRAVIDA OF ADVANCED MATERNAL AGE: ICD-10-CM

## 2024-10-18 DIAGNOSIS — Z3A.18 18 WEEKS GESTATION OF PREGNANCY: ICD-10-CM

## 2024-10-18 PROCEDURE — 76811 OB US DETAILED SNGL FETUS: CPT

## 2024-10-18 NOTE — PROGRESS NOTES
"MATERNAL FETAL MEDICINE CONSULT NOTE    Dear Dr Marisa Delacruz, DO:    Thank you for your kind referral of Patsy Motley.  As you know, she is a 34 y.o.   18w4d gestation (Estimated Date of Delivery: 3/17/25). This is a consult.     Her antepartum course is complicated by:  AMA    Aneuploidy Screening: Low risk      Subjective        Patsy Motley presents to [facility] MATERNAL FETAL MEDICINE  History of Present Illness  She denies contraction, leakage of fluid, vaginal bleeding. She is feeling normal fetal movement. She denies headaches, vision changes, shortness of breath, acute changes in edema.     Objective   Vital Signs:  /58 (BP Location: Right arm, Patient Position: Sitting)   Pulse 94   Temp 98 °F (36.7 °C) (Temporal)   Ht 157.5 cm (62\")   Wt 64 kg (141 lb 3.2 oz)   BMI 25.83 kg/m²   Estimated body mass index is 25.83 kg/m² as calculated from the following:    Height as of this encounter: 157.5 cm (62\").    Weight as of this encounter: 64 kg (141 lb 3.2 oz).      Physical Exam     General: No acute distress  Respiratory: No increased work of breathing  Cardiac: Regular rate   Abdominal: Gravid, nontender  Extremities: No significant edema  Neuro/psych: Alert and oriented, appropriate mood            A full ultrasound report can be found in ViewPoint. An abbreviated report is as follows:    Variable presentation  Anterior placenta  Biometry is consistent with preestablished dating  g, AC 78 percentile  MVP 5.1 cm which is normal  The fetal anatomic survey was unable to be completed.  The suboptimal images are listed above.  The remainder of the fetal anatomy appears normal  Transabdominal cervical length 4.13 cm which is normal       Assessment and Plan   Diagnoses and all orders for this visit:    1. Multigravida of advanced maternal age in second trimester (Primary)    2. 18 weeks gestation of pregnancy      AMA  We discussed the risks of advanced maternal age " in pregnancy.  First, we discussed the patient's age-related risk of aneuploidy.  She has had low risk NIPT this pregnancy.  We discussed the limitations of NIPT as a screening test.  She was not informed of the opportunity for diagnostic testing with amniocentesis.  She declined amniocentesis today.   We discussed that there has been shown to be an increased risk of preeclampsia, gestational diabetes, fetal growth restriction, and stillbirth.  However, many of these studies did not take in to account the comorbidities and overall health status of the mothers.  Therefore I do recommend low-dose aspirin to reduce the risk of preeclampsia this and GDM.  Otherwise there are no risk factors.     Early abnormal GCT, Presumed GDM   With history of gestational diabetes that was diet-controlled x 2.  Had elevated blood glucose test earlier this pregnancy and is now checking sugars.  This is currently being managed by Dr. Ramirez.  I would recommend continuing blood sugar monitoring throughout the entire pregnancy.    Given abnormal GCT and AMA risks, I do recommend serial growth ultrasounds and  testing starting at 32 to 34 weeks.    Summary of Plan  -Completion of anatomy with MFM in 4 weeks  -Serial growth ultrasounds every 4  weeks (by primary OB)   -Starting at 32-34  weeks: Weekly fetal  surveillance until delivery   -Starting at 28 weeks: Fetal movement instructions given continue daily until delivery; instructed to report to labor and delivery if cannot achieve more than 10 kicks in one hour or if she perceives a decrease in fetal movement  -Continue routine prenatal care with primary ob team   -Recommend delivery at 39 weeks         Follow Up   4 weeks    Thank you for the consult and opportunity to care for this patient.  Please feel free to reach out with any questions or concerns.    I spent 30 minutes caring for this patient on this date of service. This time includes time spent by me in the  following activities: preparing for the visit, reviewing tests, obtaining and/or reviewing a separately obtained history, performing a medically appropriate examination and/or evaluation, counseling and educating the patient/family/caregiver and independently interpreting results and communicating that information with the patient/family/caregiver with greater than 50% spent in counseling and coordination of care.         I spent 10 minutes on the separately reported service of US imaging not included in the time used to support the E/M service also reported today.    Nicole Fofana MD   Maternal Fetal Medicine-[facility]  Office: [phone]

## 2024-10-18 NOTE — LETTER
"2024     Marisa Delacruz DO  [address] [place] [address]    Patient: Patsy Motley   YOB: 1990   Date of Visit: 10/18/2024       Dear Marisa Delacruz DO    Patsy Motley was in my office today. Below is a copy of my note.    If you have questions, please do not hesitate to call me. I look forward to following Patsy along with you.         Sincerely,        Nicole Fofana MD        CC: No Recipients    MATERNAL FETAL MEDICINE CONSULT NOTE    Dear Dr Marisa Delacruz DO:    Thank you for your kind referral of Patsy Motley.  As you know, she is a 34 y.o.   18w4d gestation (Estimated Date of Delivery: 3/17/25). This is a consult.     Her antepartum course is complicated by:  AMA    Aneuploidy Screening: Low risk      Subjective       Patsy Motley presents to [facility] MATERNAL FETAL MEDICINE  History of Present Illness  She denies contraction, leakage of fluid, vaginal bleeding. She is feeling normal fetal movement. She denies headaches, vision changes, shortness of breath, acute changes in edema.     Objective  Vital Signs:  /58 (BP Location: Right arm, Patient Position: Sitting)   Pulse 94   Temp 98 °F (36.7 °C) (Temporal)   Ht 157.5 cm (62\")   Wt 64 kg (141 lb 3.2 oz)   BMI 25.83 kg/m²   Estimated body mass index is 25.83 kg/m² as calculated from the following:    Height as of this encounter: 157.5 cm (62\").    Weight as of this encounter: 64 kg (141 lb 3.2 oz).      Physical Exam     General: No acute distress  Respiratory: No increased work of breathing  Cardiac: Regular rate   Abdominal: Gravid, nontender  Extremities: No significant edema  Neuro/psych: Alert and oriented, appropriate mood            A full ultrasound report can be found in ViewPoint. An abbreviated report is as follows:    Variable presentation  Anterior placenta  Biometry is consistent with preestablished dating  g, AC 78 percentile  MVP 5.1 cm which is " normal  The fetal anatomic survey was unable to be completed.  The suboptimal images are listed above.  The remainder of the fetal anatomy appears normal  Transabdominal cervical length 4.13 cm which is normal       Assessment and Plan   Diagnoses and all orders for this visit:    1. Multigravida of advanced maternal age in second trimester (Primary)    2. 18 weeks gestation of pregnancy      AMA  We discussed the risks of advanced maternal age in pregnancy.  First, we discussed the patient's age-related risk of aneuploidy.  She has had low risk NIPT this pregnancy.  We discussed the limitations of NIPT as a screening test.  She was not informed of the opportunity for diagnostic testing with amniocentesis.  She declined amniocentesis today.   We discussed that there has been shown to be an increased risk of preeclampsia, gestational diabetes, fetal growth restriction, and stillbirth.  However, many of these studies did not take in to account the comorbidities and overall health status of the mothers.  Therefore I do recommend low-dose aspirin to reduce the risk of preeclampsia this and GDM.  Otherwise there are no risk factors.     Early abnormal GCT, Presumed GDM   With history of gestational diabetes that was diet-controlled x 2.  Had elevated blood glucose test earlier this pregnancy and is now checking sugars.  This is currently being managed by Dr. Ramirez.  I would recommend continuing blood sugar monitoring throughout the entire pregnancy.    Given abnormal GCT and AMA risks, I do recommend serial growth ultrasounds and  testing starting at 32 to 34 weeks.    Summary of Plan  -Completion of anatomy with MFM in 4 weeks  -Serial growth ultrasounds every 4  weeks (by primary OB)   -Starting at 32-34  weeks: Weekly fetal  surveillance until delivery   -Starting at 28 weeks: Fetal movement instructions given continue daily until delivery; instructed to report to labor and delivery if cannot achieve  more than 10 kicks in one hour or if she perceives a decrease in fetal movement  -Continue routine prenatal care with primary ob team   -Recommend delivery at 39 weeks         Follow Up   4 weeks    Thank you for the consult and opportunity to care for this patient.  Please feel free to reach out with any questions or concerns.    I spent 30 minutes caring for this patient on this date of service. This time includes time spent by me in the following activities: preparing for the visit, reviewing tests, obtaining and/or reviewing a separately obtained history, performing a medically appropriate examination and/or evaluation, counseling and educating the patient/family/caregiver and independently interpreting results and communicating that information with the patient/family/caregiver with greater than 50% spent in counseling and coordination of care.         I spent 10 minutes on the separately reported service of US imaging not included in the time used to support the E/M service also reported today.    Nicole Fofana MD   Maternal Fetal Medicine-[facility]  Office: [phone]

## 2024-10-19 ENCOUNTER — DOCUMENTATION (OUTPATIENT)
Dept: OBSTETRICS AND GYNECOLOGY | Facility: CLINIC | Age: 34
End: 2024-10-19
Payer: MEDICAID

## 2024-10-24 NOTE — PROGRESS NOTES
OB FOLLOW UP      Chief Complaint   Patient presents with    Routine Prenatal Visit     Pt. Declines flu vacc.     Subjective:   Was considering AFP last office visit- forgot to check w insurance, declines it today    No complaints, eating well, no N/V    Objective:  /64   Wt 63 kg (139 lb)   LMP 06/10/2024   BMI 25.42 kg/m²  6.35 kg (14 lb) Body mass index is 25.42 kg/m².    See OB flow sheet for fundal height (not performed if US day of OV), FHT, edema, cvx exam if performed, and Upro/Uglu.   Chaperone present during pelvic exam if performed.      Assessment and Plan:  19w4d     Diagnoses and all orders for this visit:    1. Supervision of other normal pregnancy, antepartum (Primary)  Overview:  EVERETT finalized: 3/17/25 per LMP, 10-week US and ACOG    CF negative, NIPS negative.  AFP declines 10/25/2024     COVID: Recommended, declines  Flu: Recommended, declines  Tdap:   RSV:      1hr Glucola:  FU RPR w glucola:  ? Desires Sterilization:    Anatomy US: [facility] Dr. Fofana 10/18/2024 AC 78%, anterior placenta, variable presentation, suboptimal anatomic survey- FU MF  FU US: [facility] 24    PROBLEM LIST/PLAN:   Presumed pregest DM- Elev early glucola, Hx of GDM  early 1hGTT elevated, declines 3-hour.  Checking blood sugars 4 times a day for remainder of preg per [facility]  10/25/2024 On phone today,  F 82-91, 2hr PP  all wnl  AMA 33yo- NIPS neg, [facility] consult for anatomy-suboptimal views   Serial ultrasounds for growth  Weekly antepartum testing at 32 to 34 weeks  Delivery 39+  Short interval pregnancy - monitor for  labor    UDS positive for THC at new OB- pt stopped w positive preg test    Orders:  -     POC Urinalysis Dipstick      Counseling:    Second trimester precautions    Reassuring pregnancy progress. Questions answered.  Continue PNV.  Importance of healthy eating, obtaining sufficient sleep, and staying active unless hypertensive- activity modified as directed.    Return in about 4 weeks  (around 11/22/2024) for FU OB x2.            Marisa Delacruz, DO  10/25/2024    [facility] [place] [facility]  [address] [place] [address]  Dept: [phone]  Dept Fax: [phone]  Loc: [phone]  Loc Fax: [phone]

## 2024-10-25 ENCOUNTER — ROUTINE PRENATAL (OUTPATIENT)
Dept: OBSTETRICS AND GYNECOLOGY | Facility: CLINIC | Age: 34
End: 2024-10-25
Payer: MEDICAID

## 2024-10-25 VITALS — DIASTOLIC BLOOD PRESSURE: 64 MMHG | BODY MASS INDEX: 25.42 KG/M2 | SYSTOLIC BLOOD PRESSURE: 106 MMHG | WEIGHT: 139 LBS

## 2024-10-25 DIAGNOSIS — Z34.80 SUPERVISION OF OTHER NORMAL PREGNANCY, ANTEPARTUM: Primary | ICD-10-CM

## 2024-10-25 LAB
GLUCOSE UR STRIP-MCNC: NEGATIVE MG/DL
PROT UR STRIP-MCNC: NEGATIVE MG/DL

## 2024-10-29 ENCOUNTER — TRANSCRIBE ORDERS (OUTPATIENT)
Dept: ULTRASOUND IMAGING | Facility: HOSPITAL | Age: 34
End: 2024-10-29
Payer: MEDICAID

## 2024-10-29 DIAGNOSIS — O09.529 ANTEPARTUM MULTIGRAVIDA OF ADVANCED MATERNAL AGE: Primary | ICD-10-CM

## 2024-11-12 ENCOUNTER — OFFICE VISIT (OUTPATIENT)
Dept: OBSTETRICS AND GYNECOLOGY | Facility: CLINIC | Age: 34
End: 2024-11-12
Payer: MEDICAID

## 2024-11-12 ENCOUNTER — HOSPITAL ENCOUNTER (OUTPATIENT)
Dept: ULTRASOUND IMAGING | Facility: HOSPITAL | Age: 34
Discharge: HOME | End: 2024-11-12
Admitting: STUDENT IN AN ORGANIZED HEALTH CARE EDUCATION/TRAINING PROGRAM
Payer: MEDICAID

## 2024-11-12 VITALS
HEIGHT: 62 IN | SYSTOLIC BLOOD PRESSURE: 110 MMHG | BODY MASS INDEX: 26.43 KG/M2 | HEART RATE: 80 BPM | DIASTOLIC BLOOD PRESSURE: 67 MMHG | WEIGHT: 143.6 LBS | TEMPERATURE: 98 F | OXYGEN SATURATION: 98 %

## 2024-11-12 DIAGNOSIS — O24.410 DIET CONTROLLED GESTATIONAL DIABETES MELLITUS (GDM), ANTEPARTUM: Primary | ICD-10-CM

## 2024-11-12 DIAGNOSIS — Z3A.22 22 WEEKS GESTATION OF PREGNANCY: ICD-10-CM

## 2024-11-12 DIAGNOSIS — O09.529 ANTEPARTUM MULTIGRAVIDA OF ADVANCED MATERNAL AGE: ICD-10-CM

## 2024-11-12 PROBLEM — O24.119 PRE-EXISTING TYPE 2 DIABETES MELLITUS DURING PREGNANCY, ANTEPARTUM: Status: ACTIVE | Noted: 2024-11-12

## 2024-11-12 PROBLEM — O09.90 HIGH-RISK PREGNANCY: Status: ACTIVE | Noted: 2023-08-03

## 2024-11-12 PROCEDURE — 76816 OB US FOLLOW-UP PER FETUS: CPT

## 2024-11-12 RX ORDER — PNV NO.95/FERROUS FUM/FOLIC AC 28MG-0.8MG
1 TABLET ORAL DAILY
Qty: 30 TABLET | Refills: 11 | Status: SHIPPED | OUTPATIENT
Start: 2024-11-12

## 2024-11-12 RX ORDER — BLOOD-GLUCOSE METER
KIT MISCELLANEOUS
COMMUNITY
Start: 2024-10-04

## 2024-11-12 NOTE — PROGRESS NOTES
Pt reports that she is doing well and denies vaginal bleeding, contractions or LOF at this time. Notes irregular cramping. Reports active fetal movement. Reviewed when to call OB office or present to L&D for evaluation with symptoms such as decreased fetal movement, vaginal bleeding, LOF or ctxs. Pt verbalized understanding. Denies HA, visual changes or epigastric pain. Denies any additional complaints at time of appointment. Next OB appointment scheduled for 11/15.    OB currently managing GDM.     Vitals:    11/12/24 1423   BP: 110/67   Pulse: 80   Temp: 98 °F (36.7 °C)   SpO2: 98%

## 2024-11-12 NOTE — PROGRESS NOTES
OB FOLLOW UP      Chief Complaint   Patient presents with    Routine Prenatal Visit     Subjective:   No complaints    Objective:  /81   Wt 66.2 kg (146 lb)   LMP 06/10/2024   BMI 26.70 kg/m²  9.526 kg (21 lb) Body mass index is 26.7 kg/m².    See OB flow sheet for fundal height (not performed if US day of OV), FHT, edema, cvx exam if performed, and Upro/Uglu.   Chaperone present during pelvic exam if performed.      Assessment and Plan:  22w4d     Diagnoses and all orders for this visit:    1. Supervision of other normal pregnancy, antepartum (Primary)  Overview:  EVERETT finalized: 3/17/25 per LMP, 10-week US and ACOG    CF negative, NIPS negative.  AFP declined    COVID: Recommended, declines  Flu: Recommended, declines  Tdap:   RSV:      1hr Glucola: NA, diabetic  FU RPR w glucola:  ? Desires Sterilization:    Anatomy US: [facility] Dr. Fofana 10/18/2024 AC 78%, anterior placenta, variable presentation, suboptimal anatomic survey- FU MF  FU US: MFM 24 breech, EFW 55%, AC 81%, anatomy within normal limits and completed    PROBLEM LIST/PLAN:   Presumed pregest DM- see separate  AMA 33yo- NIPS neg, [facility] consult for anatomy-completed wnl   [place] [facility] [place] at [place]  Weekly antepartum testing at 32 to 34 weeks  Delivery 39+  Short interval pregnancy - monitor for  labor    UDS positive for THC at new OB- pt stopped w positive preg test    Orders:  -     POC Urinalysis Dipstick  -     US Ob 14 + Weeks Single or First Gestation; Future    2. Pre-existing type 2 diabetes mellitus during pregnancy, antepartum  Overview:  Elev early glucola, Hx of GDM, declined 3-hour.  Checking blood sugars 4 times a day for remainder of preg per MFM    10/25/2024 F 82-91, 2hr PP  all wnl  11/15/2024 F 87-97, 2hr PP wnl    Plan:  Needs ophthalmology- pt will scheduled  Needs baseline EKG- ordered 11/15/2024   Needs baseline 24-hour urine protein and creatinine clearance- ordered  11/15/2024   Ultrasounds for growth starting at 28wks  Antepartum testing at 32-34 weeks    Orders:  -     ECG 12 Lead; Future  -     Creatinine Clearance - Urine, Clean Catch; Future  -     Protein, Urine, 24 Hour - Urine, Clean Catch; Future  -     Comprehensive Metabolic Panel; Future      Counseling:    Second trimester precautions    Reassuring pregnancy progress. Questions answered.  Continue PNV.  Importance of healthy eating, obtaining sufficient sleep, and staying active unless hypertensive- activity modified as directed.    Return in about 4 weeks (around 12/13/2024) for FU OB then [id number] [id number].  US in 6weeks.            Marisa Delacruz, DO  11/15/2024    [facility]  [facility] [place] [address]  Dept: [phone]  Dept Fax: [phone]  Loc: [phone]  Loc Fax: [phone]

## 2024-11-12 NOTE — PROGRESS NOTES
"MATERNAL FETAL MEDICINE CONSULT NOTE    Dear Dr Marisa Delacruz, DO:    Thank you for your kind referral of Patsy Motley.  As you know, she is a 34 y.o.   22w1d gestation (Estimated Date of Delivery: 3/17/25). This is a consult.     Her antepartum course is complicated by:  AMA    Aneuploidy Screening: Low risk      Subjective        Patsy Motley presents to [facility] MATERNAL FETAL MEDICINE  History of Present Illness  She denies contraction, leakage of fluid, vaginal bleeding. She is feeling normal fetal movement. She denies headaches, vision changes, shortness of breath, acute changes in edema.     Objective   Vital Signs:  /67 (BP Location: Right arm, Patient Position: Sitting)   Pulse 80   Temp 98 °F (36.7 °C) (Temporal)   Ht 157.5 cm (62\")   Wt 65.1 kg (143 lb 9.6 oz)   SpO2 98%   BMI 26.26 kg/m²   Estimated body mass index is 26.26 kg/m² as calculated from the following:    Height as of this encounter: 157.5 cm (62\").    Weight as of this encounter: 65.1 kg (143 lb 9.6 oz).      Physical Exam     General: No acute distress  Respiratory: No increased work of breathing  Cardiac: Regular rate   Abdominal: Gravid, nontender  Extremities: No significant edema  Neuro/psych: Alert and oriented, appropriate mood            A full ultrasound report can be found in ViewPoint. An abbreviated report is as follows:    Breech presentation  Anterior Placenta  MVP 5.3 cm which is normal  The fetal biometry is consistent with dates EFW 55%, AC 81%  The fetal anatomic survey appears normal and is now complete.       Assessment and Plan   Diagnoses and all orders for this visit:    1. Diet controlled gestational diabetes mellitus (GDM), antepartum (Primary)    2. Antepartum multigravida of advanced maternal age    3. 22 weeks gestation of pregnancy        AMA  Previously counseled  S/p low risk NIPS     Early abnormal GCT, Presumed GDM   Checking blood glucose; she reports that they " have been good.   Following with Dr. Delacruz   Will continue blood glucose  monitoring for the duration of the pregnancy..   Given abnormal GCT and AMA risks, I do recommend serial growth ultrasounds and  testing starting at 32 to 34 weeks.    Summary of Plan  -Serial growth ultrasounds every 4 weeks can start at 28 weeks (by primary OB)   -Starting at 32-34  weeks: Weekly fetal  surveillance until delivery   -Starting at 28 weeks: Fetal movement instructions given continue daily until delivery; instructed to report to labor and delivery if cannot achieve more than 10 kicks in one hour or if she perceives a decrease in fetal movement  -Continue routine prenatal care with primary ob team   -Recommend delivery at 39 weeks         Follow Up No further MFM follow up.    Thank you for the consult and opportunity to care for this patient.  Please feel free to reach out with any questions or concerns.      I spent 15 minutes caring for this patient on this date of service. This time includes time spent by me in the following activities: preparing for the visit, reviewing tests, obtaining and/or reviewing a separately obtained history, performing a medically appropriate examination and/or evaluation, counseling and educating the patient/family/caregiver and independently interpreting results and communicating that information with the patient/family/caregiver with greater than 50% spent in counseling and coordination of care.         I spent 4 minutes on the separately reported service of US imaging not included in the time used to support the E/M service also reported today.    Nicole Fofana MD   Maternal Fetal Medicine-[facility]  Office: [phone]

## 2024-11-12 NOTE — LETTER
"2024     Marisa Delacruz DO  [address] [place] [address]    Patient: Patsy Motley   YOB: 1990   Date of Visit: 2024       Dear Mairsa Delacruz DO    Patsy Motley was in my office today. Below is a copy of my note.    If you have questions, please do not hesitate to call me. I look forward to following Patsy along with you.         Sincerely,        Nicole Fofana MD        CC: No Recipients    MATERNAL FETAL MEDICINE CONSULT NOTE    Dear Dr Marisa Delacruz DO:    Thank you for your kind referral of Patsy Motley.  As you know, she is a 34 y.o.   22w1d gestation (Estimated Date of Delivery: 3/17/25). This is a consult.     Her antepartum course is complicated by:  AMA    Aneuploidy Screening: Low risk      Subjective       Patsy Motley presents to [facility] MATERNAL FETAL MEDICINE  History of Present Illness  She denies contraction, leakage of fluid, vaginal bleeding. She is feeling normal fetal movement. She denies headaches, vision changes, shortness of breath, acute changes in edema.     Objective  Vital Signs:  /67 (BP Location: Right arm, Patient Position: Sitting)   Pulse 80   Temp 98 °F (36.7 °C) (Temporal)   Ht 157.5 cm (62\")   Wt 65.1 kg (143 lb 9.6 oz)   SpO2 98%   BMI 26.26 kg/m²   Estimated body mass index is 26.26 kg/m² as calculated from the following:    Height as of this encounter: 157.5 cm (62\").    Weight as of this encounter: 65.1 kg (143 lb 9.6 oz).      Physical Exam     General: No acute distress  Respiratory: No increased work of breathing  Cardiac: Regular rate   Abdominal: Gravid, nontender  Extremities: No significant edema  Neuro/psych: Alert and oriented, appropriate mood            A full ultrasound report can be found in ViewPoint. An abbreviated report is as follows:    Breech presentation  Anterior Placenta  MVP 5.3 cm which is normal  The fetal biometry is consistent with dates EFW 55%, AC 81%  The " fetal anatomic survey appears normal and is now complete.       Assessment and Plan   Diagnoses and all orders for this visit:    1. Diet controlled gestational diabetes mellitus (GDM), antepartum (Primary)    2. Antepartum multigravida of advanced maternal age    3. 22 weeks gestation of pregnancy        AMA  Previously counseled  S/p low risk NIPS     Early abnormal GCT, Presumed GDM   Checking blood glucose; she reports that they have been good.   Following with Dr. Delacruz   Will continue blood glucose  monitoring for the duration of the pregnancy..   Given abnormal GCT and AMA risks, I do recommend serial growth ultrasounds and  testing starting at 32 to 34 weeks.    Summary of Plan  -Serial growth ultrasounds every 4 weeks can start at 28 weeks (by primary OB)   -Starting at 32-34  weeks: Weekly fetal  surveillance until delivery   -Starting at 28 weeks: Fetal movement instructions given continue daily until delivery; instructed to report to labor and delivery if cannot achieve more than 10 kicks in one hour or if she perceives a decrease in fetal movement  -Continue routine prenatal care with primary ob team   -Recommend delivery at 39 weeks         Follow Up No further MFM follow up.    Thank you for the consult and opportunity to care for this patient.  Please feel free to reach out with any questions or concerns.      I spent 15 minutes caring for this patient on this date of service. This time includes time spent by me in the following activities: preparing for the visit, reviewing tests, obtaining and/or reviewing a separately obtained history, performing a medically appropriate examination and/or evaluation, counseling and educating the patient/family/caregiver and independently interpreting results and communicating that information with the patient/family/caregiver with greater than 50% spent in counseling and coordination of care.         I spent 4 minutes on the separately reported  service of US imaging not included in the time used to support the E/M service also reported today.    Nicole Fofana MD   Maternal Fetal Medicine-[facility]  Office: [phone]        Pt reports that she is doing well and denies vaginal bleeding, contractions or LOF at this time. Notes irregular cramping. Reports active fetal movement. Reviewed when to call OB office or present to L&D for evaluation with symptoms such as decreased fetal movement, vaginal bleeding, LOF or ctxs. Pt verbalized understanding. Denies HA, visual changes or epigastric pain. Denies any additional complaints at time of appointment. Next OB appointment scheduled for 11/15.    OB currently managing GDM.     Vitals:    11/12/24 1423   BP: 110/67   Pulse: 80   Temp: 98 °F (36.7 °C)   SpO2: 98%

## 2024-11-15 ENCOUNTER — PATIENT MESSAGE (OUTPATIENT)
Dept: OBSTETRICS AND GYNECOLOGY | Facility: CLINIC | Age: 34
End: 2024-11-15
Payer: MEDICAID

## 2024-11-15 ENCOUNTER — ROUTINE PRENATAL (OUTPATIENT)
Dept: OBSTETRICS AND GYNECOLOGY | Facility: CLINIC | Age: 34
End: 2024-11-15
Payer: MEDICAID

## 2024-11-15 VITALS — WEIGHT: 146 LBS | SYSTOLIC BLOOD PRESSURE: 125 MMHG | BODY MASS INDEX: 26.7 KG/M2 | DIASTOLIC BLOOD PRESSURE: 81 MMHG

## 2024-11-15 DIAGNOSIS — O24.119 PRE-EXISTING TYPE 2 DIABETES MELLITUS DURING PREGNANCY, ANTEPARTUM: ICD-10-CM

## 2024-11-15 DIAGNOSIS — Z34.80 SUPERVISION OF OTHER NORMAL PREGNANCY, ANTEPARTUM: Primary | ICD-10-CM

## 2024-11-15 LAB
GLUCOSE UR STRIP-MCNC: NEGATIVE MG/DL
PROT UR STRIP-MCNC: NEGATIVE MG/DL

## 2024-11-18 NOTE — PROGRESS NOTES
Routine Prenatal Visit     Subjective  Patsy Motley is a 33 y.o.  at 37w1d here for her routine OB visit.   She is taking her prenatal vitamins.Reports no loss of fluid or vaginal bleeding. Patient doing well without any complaints. Pregnancy complicated by:     Patient Active Problem List   Diagnosis    Supervision of other normal pregnancy, antepartum    History of gestational diabetes in prior pregnancy, currently pregnant    Maternal anemia in pregnancy, antepartum    Diet controlled gestational diabetes mellitus (GDM), antepartum     BS reviewed and WNL.     OB History    Para Term  AB Living   6 4 4   1 4   SAB IAB Ectopic Molar Multiple Live Births   1       0 4      # Outcome Date GA Lbr Tyrone/2nd Weight Sex Delivery Anes PTL Lv   6 Current            5 Term 22 39w5d 01:27 / 00:16 3354 g (7 lb 6.3 oz) F Vag-Spont EPI N AMARJIT      Birth Comments: scale 4   4 Term 11 39w0d  2807 g (6 lb 3 oz) F Vag-Spont EPI N AMARJIT   3 Term 05/22/10 39w0d  2892 g (6 lb 6 oz) F Vag-Spont  N AMARJIT   2 Term 09 40w0d  3402 g (7 lb 8 oz) M Vag-Spont EPI N AMARJIT   1 2007 14w0d    SAB              ROS:   General ROS: negative for - chills or fatigue  Respiratory ROS: negative for - cough or hemoptysis  Cardiovascular ROS: negative for - chest pain or dyspnea on exertion  Genito-Urinary ROS: negative for  change in urinary stream, vaginal discharge   Musculoskeletal ROS: negative for - gait disturbance or joint pain  Dermatological ROS: negative for acne,  dry skin or itching    Objective  Physical Exam:   Vitals:    24 1320   BP: 122/68       Uterine Size: size equals dates  FHT: 110-160 BPM    General appearance - alert, well appearing, and in no distress  Mental status - alert, oriented to person, place, and time  Abdomen- Soft, Gravid uterus, non-tender to palpation  Musculoskeletal: negative for - gait disturbance or joint pain  Extremeties: negative swelling or cyanosis  Shioma, pharmacist - [facility] called, verified patient's Name and . States prescription for Freestyle Babar 14 Day Sensor is not covered and needs prior authorization.    Routed to Triage Support for assistance. Thank you.      Dermatological: negative rashes or skin lesions     Assessment/Plan:   Diagnoses and all orders for this visit:    1. Supervision of other normal pregnancy, antepartum (Primary)  -     POC Urinalysis Dipstick    2. History of gestational diabetes in prior pregnancy, currently pregnant    3. Maternal anemia in pregnancy, antepartum    4. Diet controlled gestational diabetes mellitus (GDM), antepartum            Counseling:     Round Ligament Pain:  The uterus has several ligaments which provide support and keep the uterus in place. As the  uterus grows these ligaments are pulled and stretched which often causes sharp stabbing like pain in the inguinal area.   You may find a pregnancy support band helpful. Changing positions may also help. Yoga is a great way to cope with round ligament and low back pain in pregnancy.    Massage may also help with low back pain   Things to Consider at this Point in your Pregnancy:  Some women experience swelling in their feet during pregnancy. Compression stockings may help  Drink plenty of water and stay active   Make sure you are eating frequent small meals, nuts are a wonderful snack to keep with you            Return in about 1 week (around 2/14/2024) for Routine OB visit.      We have gone over prenatal care to include the timing and content of visits. I informed her how to contact the office and/or on call person in the event of any problems and encouraged her to do so when she feels it is necessary.  We then spent time answering her questions which she indicated were answered to her satisfaction.    Shirley Gonzalez DO  2/7/2024 13:33 EST

## 2024-11-21 LAB
COLLECT DURATION TIME UR: 24 HRS
PROT 24H UR-MRATE: 98.6 MG/24HOURS (ref 0–150)
SPECIMEN VOL 24H UR: 1450 ML

## 2024-11-21 PROCEDURE — 84156 ASSAY OF PROTEIN URINE: CPT | Performed by: OBSTETRICS & GYNECOLOGY

## 2024-11-21 PROCEDURE — 81050 URINALYSIS VOLUME MEASURE: CPT | Performed by: OBSTETRICS & GYNECOLOGY

## 2024-12-16 ENCOUNTER — TELEPHONE (OUTPATIENT)
Dept: OBSTETRICS AND GYNECOLOGY | Facility: CLINIC | Age: 34
End: 2024-12-16
Payer: MEDICAID

## 2024-12-16 NOTE — TELEPHONE ENCOUNTER
Caller: Patsy Motley    Relationship to patient: Self    Best call back number: [phone]    Patient is needing: PATIENT IS WANTING TO DOUBLE CHECK ON THE EYE  THAT DR. WOODS SUGGESTED SHE SEE. IS WANTING TO KNOW IF IT IS [facility]. PATIENT STATED A MESSAGE ON [employer] CAN BE SENT WITH THE ANSWER.

## 2024-12-17 NOTE — PROGRESS NOTES
Routine Prenatal Visit     Subjective  Patsy Motley is a 34 y.o.  at 27w3d here for her routine OB visit.   She is taking her prenatal vitamins.Reports no loss of fluid or vaginal bleeding. Patient doing well.     Pregnancy is complicated by:     Patient Active Problem List   Diagnosis    High-risk pregnancy    History of gestational diabetes in prior pregnancy, currently pregnant    Short interval between pregnancies affecting pregnancy, antepartum    Antepartum multigravida of advanced maternal age    Positive urine drug screen    Abnormal glucose tolerance test    Pre-existing type 2 diabetes mellitus during pregnancy, antepartum         OB History    Para Term  AB Living   7 5 5   1 5   SAB IAB Ectopic Molar Multiple Live Births   1       0 5      # Outcome Date GA Lbr Tyrone/2nd Weight Sex Type Anes PTL Lv   7 Current            6 Term 24 39w0d 01:38 / 00:04 3570 g (7 lb 13.9 oz) F Vag-Spont OTHER N AMARJIT   5 Term 22 39w5d 01:27 / 00:16 3354 g (7 lb 6.3 oz) F Vag-Spont EPI N AMARJIT      Birth Comments: scale 4   4 Term 11 39w0d  2807 g (6 lb 3 oz) F Vag-Spont EPI N AMARJIT   3 Term 05/22/10 39w0d  2892 g (6 lb 6 oz) F Vag-Spont EPI N AMARJIT   2 Term 09 40w0d  3402 g (7 lb 8 oz) M Vag-Spont EPI N AMARJIT   1 SAB  9w0d    SAB         Obstetric Comments   GDM only w prior preg // ap 2024            ROS:    General ROS: negative for - chills or fatigue  Genito-Urinary ROS: negative for  change in urinary stream, vaginal discharge     Objective  Physical Exam:    Vitals:    24 1324   BP: 111/68       Uterine Size: size equals dates  FHT: 110-160 BPM     General appearance - alert, well appearing, and in no distress  Abdomen- Soft, Gravid uterus, non-tender to palpation  Extremeties: negative swelling       Assessment/Plan:   Diagnoses and all orders for this visit:    1. Supervision of other normal pregnancy, antepartum (Primary)  -     POC Urinalysis Dipstick    2.  Pre-existing type 2 diabetes mellitus during pregnancy, antepartum    3. High risk pregnancy, antepartum  Assessment & Plan:  EVERETT finalized: 3/17/25 per LMP, 10-week US and ACOG     CF negative, NIPS negative.  AFP declined     COVID: Recommended, declines  Flu: Recommended, declines  Tdap:   RSV:       1hr Glucola: NA, diabetic  FU RPR w glucola:  ? Desires Sterilization:     Anatomy US: [facility] Dr. Fofana 10/18/2024 AC 78%, anterior placenta, variable presentation, suboptimal anatomic survey- FU MFM  FU US: [facility] 24 breech, EFW 55%, AC 81%, anatomy within normal limits and completed  FU US: [facility] 24     PROBLEM LIST/PLAN:   Presumed pregest DM- see separate  AMA 33yo- NIPS neg, MFM consult for anatomy-completed wnl              Serial ultrasounds for growth at [facility] starting at 28 weeks  Weekly antepartum testing at 32 to 34 weeks  Delivery 39+  Short interval pregnancy - monitor for  labor     UDS positive for THC at new OB- pt stopped w positive preg test      4. Antepartum multigravida of advanced maternal age    5. History of gestational diabetes in prior pregnancy, currently pregnant    6. Short interval between pregnancies affecting pregnancy, antepartum          Counseling:   OB precautions, leaking, VB, maynor landon vs PTL/Labor  FKC  Round Ligament Pain:  The uterus has several ligaments which provide support and keep the uterus in place. As the  uterus grows these ligaments are pulled and stretched which often causes sharp stabbing like pain in the inguinal area.   You may find a pregnancy support band helpful. Changing positions may also help. Yoga is a great way to cope with round ligament and low back pain in pregnancy.    Massage may also help with low back pain   Things to Consider at this Point in your Pregnancy:  Some women experience swelling in their feet during pregnancy. Compression stockings may help  Drink plenty of water and stay active   Make sure you are eating frequent small  meals, nuts are a wonderful snack to keep with you            Return for Routine OB visit, Next scheduled follow up.      We have gone over prenatal care to include the timing and content of visits. I informed her how to contact the office and/or on call person in the event of any problems and encouraged her to do so when she feels it is necessary.  We then spent time answering her questions which she indicated were answered to her satisfaction.    Mindy Flores DO  12/19/2024 11:47 EST

## 2024-12-18 ENCOUNTER — ROUTINE PRENATAL (OUTPATIENT)
Dept: OBSTETRICS AND GYNECOLOGY | Facility: CLINIC | Age: 34
End: 2024-12-18
Payer: MEDICAID

## 2024-12-18 DIAGNOSIS — O24.119 PRE-EXISTING TYPE 2 DIABETES MELLITUS DURING PREGNANCY, ANTEPARTUM: ICD-10-CM

## 2024-12-18 DIAGNOSIS — Z86.32 HISTORY OF GESTATIONAL DIABETES IN PRIOR PREGNANCY, CURRENTLY PREGNANT: ICD-10-CM

## 2024-12-18 DIAGNOSIS — O09.899 SHORT INTERVAL BETWEEN PREGNANCIES AFFECTING PREGNANCY, ANTEPARTUM: ICD-10-CM

## 2024-12-18 DIAGNOSIS — Z34.80 SUPERVISION OF OTHER NORMAL PREGNANCY, ANTEPARTUM: Primary | ICD-10-CM

## 2024-12-18 DIAGNOSIS — O09.529 ANTEPARTUM MULTIGRAVIDA OF ADVANCED MATERNAL AGE: ICD-10-CM

## 2024-12-18 DIAGNOSIS — O09.90 HIGH RISK PREGNANCY, ANTEPARTUM: ICD-10-CM

## 2024-12-18 DIAGNOSIS — O09.299 HISTORY OF GESTATIONAL DIABETES IN PRIOR PREGNANCY, CURRENTLY PREGNANT: ICD-10-CM

## 2024-12-19 VITALS — WEIGHT: 145 LBS | DIASTOLIC BLOOD PRESSURE: 68 MMHG | BODY MASS INDEX: 26.52 KG/M2 | SYSTOLIC BLOOD PRESSURE: 111 MMHG

## 2024-12-19 NOTE — PROGRESS NOTES
OB FOLLOW UP      Chief Complaint   Patient presents with    Routine Prenatal Visit     Subjective:   Had an episode of light headedness and dizziness and seeing tunnel vision when going up stairs quickly.  Blood sugar was 85.    Objective:  /62   Wt 66.2 kg (146 lb)   LMP 06/10/2024   BMI 26.70 kg/m²  9.526 kg (21 lb) Body mass index is 26.7 kg/m².    See OB flow sheet for fundal height (not performed if US day of OV), FHT, edema, cvx exam if performed, and Upro/Uglu.   Chaperone present during pelvic exam if performed.      Assessment and Plan:  28w3d     Diagnoses and all orders for this visit:    1. High risk pregnancy, antepartum (Primary)  Overview:  EVERETT finalized: 3/17/25 per LMP, 10-week US and ACOG    CF negative, NIPS negative.  AFP declined    COVID: Recommended, declines  Flu: Recommended, declines  Tdap: Recommended, s/p Rx 2024   RSV:      1hr Glucola: NA, diabetic  FU TPA 28weeks: 2024   ? Desires Sterilization:  considering sterilization 2024     Anatomy US: [facility] Dr. Fofana 10/18/2024 AC 78%, anterior placenta, variable presentation, suboptimal anatomic survey- FU [facility]  FU US: [facility] 24 breech, EFW 55%, AC 81%, anatomy within normal limits and completed  FU US: [facility] 24 breech, EFW 85%, AC 84%, TANNER 19, gd1 placenta    PROBLEM LIST/PLAN:   Presumed pregest DM- see separate  AMA 35yo- NIPS neg, MFM consult for anatomy-completed wnl   Serial ultrasounds for growth at [facility] starting at 28 weeks  Weekly antepartum testing at 32 to 34 weeks  Delivery 39+  Short interval pregnancy - monitor for  labor    UDS positive for THC at new OB- pt stopped w positive preg test    Orders:  -     POC Urinalysis Dipstick  -     Cancel: CBC (No Diff)  -     Cancel: Treponema pallidum AB w/Reflex RPR  -     CBC (No Diff); Future  -     Treponema pallidum AB w/Reflex RPR; Future    2. Pre-existing type 2 diabetes mellitus during pregnancy, antepartum  Overview:  Elev early glucola,  "Hx of GDM, declined 3-hour.  Checking blood sugars 4 times a day for remainder of preg per MFM  S/p ophthalmology- wnl Dec 2024  S/p 24-hour urine protein Nov 2024 98mg    10/25/2024 F 82-91, 2hr PP  all wnl  11/15/2024 F 87-97, 2hr PP wnl  12/18/24 F 88-98 (only 2 elevated), 2 elevated PP with all others wnl, states diet related. Advised dietary modifications  12/26/2024 no BS log today.  F \"85\", 2PP \"highest 120\"      Plan:  Needs baseline EKG- ordered 11/15/2024   Needs baseline creatinine clearance- ordered 11/15/2024 >>reordered and container given 12/26/2024   Ultrasounds for growth starting at 28wks  Antepartum testing at 32-34 weeks    Orders:  -     US Ob 14 + Weeks Single or First Gestation; Future  -     Comprehensive Metabolic Panel; Future  -     Creatinine Clearance - Urine, Clean Catch; Future      Counseling:    OB precautions, leaking, VB, maynor landon vs PTL/Labor  FKC  BREECH (or NON-VERTEX) presentation discussed.  Importance of immediate evaluation on L+D if suspected SROM or labor (or any concerns) and making providers aware of possible non VTX.  DIZZINESS/LIGHTHEADEDNESS in pregnancy reviewed.  Recommend small frequent meals, high in protein, staying hydrated, avoid prolonged standing/sitting  and/or rising quickly from lying/sitting to standing.       Reassuring pregnancy progress. Questions answered.  Continue PNV.  Importance of healthy eating, obtaining sufficient sleep, and staying active unless hypertensive- activity modified as directed.    Return for FU OB q2wks to 36weeks, US 4weeks.            Marisa Delacruz,   12/26/2024    [facility]  [address] [place] [address]  Dept: [phone]  Dept Fax: [phone]  Loc: [phone]  Loc Fax: [phone]  "

## 2024-12-26 ENCOUNTER — ROUTINE PRENATAL (OUTPATIENT)
Dept: OBSTETRICS AND GYNECOLOGY | Facility: CLINIC | Age: 34
End: 2024-12-26
Payer: MEDICAID

## 2024-12-26 VITALS — SYSTOLIC BLOOD PRESSURE: 108 MMHG | BODY MASS INDEX: 26.7 KG/M2 | DIASTOLIC BLOOD PRESSURE: 62 MMHG | WEIGHT: 146 LBS

## 2024-12-26 DIAGNOSIS — O24.119 PRE-EXISTING TYPE 2 DIABETES MELLITUS DURING PREGNANCY, ANTEPARTUM: ICD-10-CM

## 2024-12-26 DIAGNOSIS — O09.90 HIGH RISK PREGNANCY, ANTEPARTUM: Primary | ICD-10-CM

## 2024-12-26 LAB
GLUCOSE UR STRIP-MCNC: NEGATIVE MG/DL
PROT UR STRIP-MCNC: NEGATIVE MG/DL

## 2024-12-30 ENCOUNTER — TELEPHONE (OUTPATIENT)
Dept: OBSTETRICS AND GYNECOLOGY | Facility: CLINIC | Age: 34
End: 2024-12-30
Payer: MEDICAID

## 2024-12-30 NOTE — TELEPHONE ENCOUNTER
[id number]:  Pt phone would not accept calls. She does have my chart. Calling to tell her about next appt. If this date or time does not work please call the office to reschdule.sb

## 2025-01-10 ENCOUNTER — ROUTINE PRENATAL (OUTPATIENT)
Dept: OBSTETRICS AND GYNECOLOGY | Facility: CLINIC | Age: 35
End: 2025-01-10
Payer: MEDICAID

## 2025-01-10 VITALS — DIASTOLIC BLOOD PRESSURE: 74 MMHG | SYSTOLIC BLOOD PRESSURE: 124 MMHG | BODY MASS INDEX: 27.25 KG/M2 | WEIGHT: 149 LBS

## 2025-01-10 DIAGNOSIS — K21.9 GASTROESOPHAGEAL REFLUX DISEASE WITHOUT ESOPHAGITIS: ICD-10-CM

## 2025-01-10 DIAGNOSIS — O24.119 PRE-EXISTING TYPE 2 DIABETES MELLITUS DURING PREGNANCY, ANTEPARTUM: ICD-10-CM

## 2025-01-10 DIAGNOSIS — O09.90 HIGH RISK PREGNANCY, ANTEPARTUM: Primary | ICD-10-CM

## 2025-01-10 PROBLEM — Z30.2 REQUEST FOR STERILIZATION: Status: ACTIVE | Noted: 2025-01-10

## 2025-01-10 LAB
ALBUMIN SERPL-MCNC: 3.5 G/DL (ref 3.5–5.2)
ALBUMIN/GLOB SERPL: 1.2 G/DL
ALP SERPL-CCNC: 78 U/L (ref 39–117)
ALT SERPL W P-5'-P-CCNC: 11 U/L (ref 1–33)
ANION GAP SERPL CALCULATED.3IONS-SCNC: 11 MMOL/L (ref 5–15)
AST SERPL-CCNC: 16 U/L (ref 1–32)
BILIRUB SERPL-MCNC: 0.2 MG/DL (ref 0–1.2)
BUN SERPL-MCNC: 10 MG/DL (ref 6–20)
BUN/CREAT SERPL: 20 (ref 7–25)
CALCIUM SPEC-SCNC: 9.2 MG/DL (ref 8.6–10.5)
CHLORIDE SERPL-SCNC: 104 MMOL/L (ref 98–107)
CO2 SERPL-SCNC: 20 MMOL/L (ref 22–29)
CREAT SERPL-MCNC: 0.5 MG/DL (ref 0.57–1)
DEPRECATED RDW RBC AUTO: 38.7 FL (ref 37–54)
EGFRCR SERPLBLD CKD-EPI 2021: 125.6 ML/MIN/1.73
ERYTHROCYTE [DISTWIDTH] IN BLOOD BY AUTOMATED COUNT: 12.3 % (ref 12.3–15.4)
GLOBULIN UR ELPH-MCNC: 2.9 GM/DL
GLUCOSE SERPL-MCNC: 80 MG/DL (ref 65–99)
GLUCOSE UR STRIP-MCNC: NEGATIVE MG/DL
HCT VFR BLD AUTO: 32.2 % (ref 34–46.6)
HGB BLD-MCNC: 11.2 G/DL (ref 12–15.9)
MCH RBC QN AUTO: 30 PG (ref 26.6–33)
MCHC RBC AUTO-ENTMCNC: 34.8 G/DL (ref 31.5–35.7)
MCV RBC AUTO: 86.3 FL (ref 79–97)
PLATELET # BLD AUTO: 209 10*3/MM3 (ref 140–450)
PMV BLD AUTO: 9.1 FL (ref 6–12)
POTASSIUM SERPL-SCNC: 4 MMOL/L (ref 3.5–5.2)
PROT SERPL-MCNC: 6.4 G/DL (ref 6–8.5)
PROT UR STRIP-MCNC: NEGATIVE MG/DL
RBC # BLD AUTO: 3.73 10*6/MM3 (ref 3.77–5.28)
SODIUM SERPL-SCNC: 135 MMOL/L (ref 136–145)
TREPONEMA PALLIDUM IGG+IGM AB [PRESENCE] IN SERUM OR PLASMA BY IMMUNOASSAY: NORMAL
WBC NRBC COR # BLD AUTO: 5.85 10*3/MM3 (ref 3.4–10.8)

## 2025-01-10 PROCEDURE — 86780 TREPONEMA PALLIDUM: CPT | Performed by: OBSTETRICS & GYNECOLOGY

## 2025-01-10 PROCEDURE — 80053 COMPREHEN METABOLIC PANEL: CPT | Performed by: OBSTETRICS & GYNECOLOGY

## 2025-01-10 PROCEDURE — 85027 COMPLETE CBC AUTOMATED: CPT | Performed by: OBSTETRICS & GYNECOLOGY

## 2025-01-10 RX ORDER — PANTOPRAZOLE SODIUM 20 MG/1
20 TABLET, DELAYED RELEASE ORAL DAILY PRN
Qty: 30 TABLET | Refills: 1 | Status: SHIPPED | OUTPATIENT
Start: 2025-01-10

## 2025-01-10 NOTE — PROGRESS NOTES
OB FOLLOW UP      Chief Complaint   Patient presents with    Routine Prenatal Visit     Subjective:   28-week labs not obtained since last office visit or EKG ordered in November.      Was considering sterilization last office visit    No complaints except reflux.  Denies VB, leaking fluid, current regular cramping or contractions.    Objective:  /74   Wt 67.6 kg (149 lb)   LMP 06/10/2024   BMI 27.25 kg/m²  10.9 kg (24 lb) Body mass index is 27.25 kg/m².  Chaperone present during pelvic exam if performed.  See OB flow sheet for fundal height (not performed if US day of OV), FHT, edema, cvx exam if performed, and Upro/Uglu.       Assessment and Plan:  30w4d     Diagnoses and all orders for this visit:    1. High risk pregnancy, antepartum (Primary)  Overview:  EVERETT finalized: 3/17/25 per LMP, 10-week US and ACOG    CF negative, NIPS negative.  AFP declined    COVID: Recommended, declines  Flu: Recommended, declines  Tdap: Recommended, s/p Rx   RSV:      1hr Glucola: NA, diabetic  FU TPA 28weeks: 1/10/2025   ? Desires Sterilization:  desires if CS, filshie clips 1/10/2025     Anatomy US: [facility] Dr. Fofana 10/18/2024 AC 78%, anterior placenta, variable presentation, suboptimal anatomic survey- FU [facility]  FU US: [facility] 24 breech, EFW 55%, AC 81%, anatomy within normal limits and completed  [place] [facility] [place] bree[place], EFW 85%, AC 84%, TANNER 19, gd1 placenta  FU US: [facility] 25    PROBLEM LIST/PLAN:   Presumed pregest DM- see separate  AMA 33yo- NIPS neg, MFM consult for anatomy-completed wnl   Serial ultrasounds for growth at [facility] starting at 28 weeks  Weekly antepartum testing at 32 to 34 weeks  Delivery 39+  Short interval pregnancy - monitor for  labor    UDS positive for THC at new OB- pt stopped w positive preg test    Orders:  -     POC Urinalysis Dipstick  -     CBC (No Diff)  -     Treponema pallidum AB w/Reflex RPR    2. Pre-existing type 2 diabetes mellitus during pregnancy,  "antepartum  Overview:  Elev early glucola, Hx of GDM, declined 3-hour.  Checking blood sugars 4 times a day for remainder of preg per MFM  S/p ophthalmology- wnl Dec 2024  S/p 24-hour urine protein Nov 2024 98mg    10/25/2024 F 82-91, 2hr PP  all wnl  11/15/2024 F 87-97, 2hr PP wnl  12/18/24 F 88-98 (only 2 elevated), 2 elevated PP with all others wnl, states diet related. Advised dietary modifications  12/26/2024 no BS log today.  F \"85\", 2PP \"highest 120\"  1/10/2025 no BS log today.  F\"85\", 2hr PP \"highest is after dinner 111-120\", had one elevation 131 (pasta)      Plan:  Needs baseline EKG- ordered 11/15/2024   Needs baseline creatinine clearance- ordered 11/15/2024 >>reordered and container given 12/26/2024   Ultrasounds for growth starting at 28wks  Antepartum testing at 32-34 weeks.  Ordered 1/10/2025     Orders:  -     Fetal Nonstress Test; Standing  -     Comprehensive Metabolic Panel    3. Gastroesophageal reflux disease without esophagitis  -     pantoprazole (Protonix) 20 MG EC tablet; Take 1 tablet by mouth Daily As Needed for Heartburn.  Dispense: 30 tablet; Refill: 1      Counseling:    OB precautions, leaking, VB, maynor landon vs PTL/Labor  FKC  GERD in pregnancy discussed.  Recommend smaller meals, avoid lying down at least 2hrs after meals, and avoid foods that trigger it (commonly highly seasoned foods, pizza, italian, mexican etc).  OTC Tums safe.  If using them regularly recommend other medication options that can be prescribed.   24h u pro and EKG importance, pt agrees      Reassuring pregnancy progress. Questions answered.  Continue PNV.  Importance of healthy eating, obtaining sufficient sleep, and staying active unless hypertensive- activity modified as directed.    Return in about 2 weeks (around 1/24/2025) for FU OB q2wks to 36weeks, schedule weekly NSTs at 32weeks.            Marisa Delacruz,   01/10/2025    [facility]  [address] [place] [address]  Dept: [phone]  Dept Fax: [phone]  Loc: [phone]  Loc Fax: [phone]

## 2025-01-12 DIAGNOSIS — O24.119 PRE-EXISTING TYPE 2 DIABETES MELLITUS DURING PREGNANCY, ANTEPARTUM: Primary | ICD-10-CM

## 2025-01-12 PROBLEM — O99.013 ANEMIA DURING PREGNANCY IN THIRD TRIMESTER: Status: ACTIVE | Noted: 2025-01-12

## 2025-01-12 RX ORDER — FERROUS SULFATE 325(65) MG
325 TABLET ORAL EVERY OTHER DAY
Qty: 15 TABLET | Refills: 0 | Status: SHIPPED | OUTPATIENT
Start: 2025-01-12

## 2025-01-14 ENCOUNTER — HOSPITAL ENCOUNTER (OUTPATIENT)
Dept: CARDIOLOGY | Facility: HOSPITAL | Age: 35
Discharge: HOME | End: 2025-01-14
Payer: MEDICAID

## 2025-01-14 ENCOUNTER — LAB (OUTPATIENT)
Facility: HOSPITAL | Age: 35
End: 2025-01-14
Payer: MEDICAID

## 2025-01-14 DIAGNOSIS — O24.119 PRE-EXISTING TYPE 2 DIABETES MELLITUS DURING PREGNANCY, ANTEPARTUM: ICD-10-CM

## 2025-01-14 LAB
ALBUMIN SERPL-MCNC: 3.6 G/DL (ref 3.5–5.2)
ALBUMIN/GLOB SERPL: 1.2 G/DL
ALP SERPL-CCNC: 89 U/L (ref 39–117)
ALT SERPL W P-5'-P-CCNC: 9 U/L (ref 1–33)
ANION GAP SERPL CALCULATED.3IONS-SCNC: 12.6 MMOL/L (ref 5–15)
AST SERPL-CCNC: 15 U/L (ref 1–32)
BILIRUB SERPL-MCNC: 0.2 MG/DL (ref 0–1.2)
BUN SERPL-MCNC: 12 MG/DL (ref 6–20)
BUN/CREAT SERPL: 24 (ref 7–25)
CALCIUM SPEC-SCNC: 9.5 MG/DL (ref 8.6–10.5)
CHLORIDE SERPL-SCNC: 104 MMOL/L (ref 98–107)
CO2 SERPL-SCNC: 20.4 MMOL/L (ref 22–29)
COLLECT DURATION TIME UR: 24 HRS
CREAT CL 24H UR+SERPL-VRATE: 105.4 ML/MIN (ref 88–128)
CREAT CL 24H UR+SERPL-VRATE: 151.7 L/24 HR (ref 126.7–184.3)
CREAT SERPL-MCNC: 0.5 MG/DL (ref 0.57–1)
CREAT UR-MCNC: 0.5 MG/DL (ref 0.6–1.3)
CREAT UR-MCNC: 49.4 MG/DL
CREATINE 24H UR-MRATE: 0.74 G/24 HR (ref 0.7–1.6)
EGFRCR SERPLBLD CKD-EPI 2021: 125.6 ML/MIN/1.73
GLOBULIN UR ELPH-MCNC: 3 GM/DL
GLUCOSE SERPL-MCNC: 74 MG/DL (ref 65–99)
POTASSIUM SERPL-SCNC: 4.1 MMOL/L (ref 3.5–5.2)
PROT SERPL-MCNC: 6.6 G/DL (ref 6–8.5)
QT INTERVAL: 349 MS
QTC INTERVAL: 409 MS
SODIUM SERPL-SCNC: 137 MMOL/L (ref 136–145)
SPECIMEN VOL 24H UR: 1500 ML

## 2025-01-14 PROCEDURE — 93005 ELECTROCARDIOGRAM TRACING: CPT | Performed by: OBSTETRICS & GYNECOLOGY

## 2025-01-14 PROCEDURE — 82575 CREATININE CLEARANCE TEST: CPT

## 2025-01-14 PROCEDURE — 80053 COMPREHEN METABOLIC PANEL: CPT

## 2025-01-14 PROCEDURE — 36415 COLL VENOUS BLD VENIPUNCTURE: CPT

## 2025-01-23 ENCOUNTER — HOSPITAL ENCOUNTER (OUTPATIENT)
Facility: HOSPITAL | Age: 35
Discharge: HOME | End: 2025-01-23
Attending: OBSTETRICS & GYNECOLOGY | Admitting: OBSTETRICS & GYNECOLOGY
Payer: MEDICAID

## 2025-01-23 VITALS
OXYGEN SATURATION: 95 % | DIASTOLIC BLOOD PRESSURE: 60 MMHG | HEART RATE: 83 BPM | SYSTOLIC BLOOD PRESSURE: 103 MMHG | RESPIRATION RATE: 18 BRPM

## 2025-01-23 PROCEDURE — 59025 FETAL NON-STRESS TEST: CPT | Performed by: OBSTETRICS & GYNECOLOGY

## 2025-01-23 PROCEDURE — 59025 FETAL NON-STRESS TEST: CPT

## 2025-01-23 NOTE — NON STRESS TEST
Obstetrical Non-stress Test Interpretation     Name:  Patsy Motley  MRN: [id number]    34 y.o. female  at 32w3d    Indication: pre-existing type 2 diabetes       Fetal Assessment  Fetal Movement: active  Fetal HR Assessment Method: external  Fetal HR (beats/min): 130  Fetal HR Baseline: normal range  Fetal HR Variability: moderate (amplitude range 6 to 25 bpm)  Fetal HR Accelerations: greater than/equal to 15 bpm, lasting at least 15 seconds  Fetal HR Decelerations: absent  Sinusoidal Pattern Present: absent    /60 (BP Location: Right arm)   Pulse 83   Resp 18   LMP 06/10/2024   SpO2 95%     Reason for test: Diabetes (pre-existing type 2 diabetes)  Date of Test: 2025  Time frame of test: 9959-0152  RN NST Interpretation: Reactive      Dianelys Valdez RN  2025  12:27 EST

## 2025-01-23 NOTE — NON STRESS TEST
LIZZIE Contreras   OB NST Note    2025   Name:  Patsy Motley  MRN: [id number]    Subjective:  34 y.o.  at 32w3d    Indication: Pre-gestational DM    NST:   Baseline: 130  Variability:   Moderate/Normal (amplitude 6-25 bpm)  Accelerations: Present (32 weeks+) 15 x 15 bpm  Decelerations: Absent   Contractions:  Not regular    NST interpretation: reactive    Documented Vitals    25 1206   BP: 103/60   Pulse: 83   Resp: 18   SpO2: 95%         Lab Results (last 24 hours)       ** No results found for the last 24 hours. **             Assessment:  34 y.o.  AT 32w3d  Pre-gestational DM    Plan:   OB Precautions, FKC, Keep scheduled NSTs, Keep office visit      Electronically signed by Tobias Acosta MD, 25, 1:56 PM EST.

## 2025-01-24 ENCOUNTER — ROUTINE PRENATAL (OUTPATIENT)
Dept: OBSTETRICS AND GYNECOLOGY | Facility: CLINIC | Age: 35
End: 2025-01-24
Payer: MEDICAID

## 2025-01-24 VITALS — DIASTOLIC BLOOD PRESSURE: 76 MMHG | SYSTOLIC BLOOD PRESSURE: 112 MMHG | BODY MASS INDEX: 27.25 KG/M2 | WEIGHT: 149 LBS

## 2025-01-24 DIAGNOSIS — O09.899 SHORT INTERVAL BETWEEN PREGNANCIES AFFECTING PREGNANCY, ANTEPARTUM: ICD-10-CM

## 2025-01-24 DIAGNOSIS — O24.119 PRE-EXISTING TYPE 2 DIABETES MELLITUS DURING PREGNANCY, ANTEPARTUM: ICD-10-CM

## 2025-01-24 DIAGNOSIS — O09.90 HIGH RISK PREGNANCY, ANTEPARTUM: Primary | ICD-10-CM

## 2025-01-24 DIAGNOSIS — O40.3XX0 POLYHYDRAMNIOS IN THIRD TRIMESTER COMPLICATION, SINGLE OR UNSPECIFIED FETUS: ICD-10-CM

## 2025-01-24 DIAGNOSIS — O36.60X0 EXCESSIVE FETAL GROWTH AFFECTING MANAGEMENT OF PREGNANCY, ANTEPARTUM, SINGLE OR UNSPECIFIED FETUS: ICD-10-CM

## 2025-01-24 DIAGNOSIS — K21.9 GASTROESOPHAGEAL REFLUX DISEASE WITHOUT ESOPHAGITIS: ICD-10-CM

## 2025-01-24 LAB
GLUCOSE UR STRIP-MCNC: NEGATIVE MG/DL
PROT UR STRIP-MCNC: NEGATIVE MG/DL

## 2025-01-24 NOTE — ASSESSMENT & PLAN NOTE
Elev early glucola, Hx of GDM, declined 3-hour.  Checking blood sugars 4 times a day for remainder of preg per MFM  S/p ophthalmology- wnl Dec 2024  S/p 24-hour urine protein Nov 2024 98mg  BS reviewed and overall WNL-1/24/25

## 2025-01-24 NOTE — PROGRESS NOTES
Routine Prenatal Visit     Subjective  Patsy Motley is a 34 y.o.  at 32w4d here for her routine OB visit.   She is taking her prenatal vitamins.Reports no loss of fluid or vaginal bleeding. Patient doing well without any complaints. Pregnancy complicated by:     Patient Active Problem List   Diagnosis    High-risk pregnancy    History of gestational diabetes in prior pregnancy, currently pregnant    Short interval between pregnancies affecting pregnancy, antepartum    Antepartum multigravida of advanced maternal age    Positive urine drug screen    Abnormal glucose tolerance test    Pre-existing type 2 diabetes mellitus during pregnancy, antepartum    Maternal care for breech presentation, single gestation    Request for sterilization    Anemia during pregnancy in third trimester    LGA (large for gestational age) fetus affecting management of mother    Polyhydramnios in third trimester         OB History    Para Term  AB Living   7 5 5   1 5   SAB IAB Ectopic Molar Multiple Live Births   1       0 5      # Outcome Date GA Lbr Tyrone/2nd Weight Sex Type Anes PTL Lv   7 Current            6 Term 24 39w0d 01:38 / 00:04 3570 g (7 lb 13.9 oz) F Vag-Spont OTHER N AMARJIT   5 Term 22 39w5d 01:27 / 00:16 3354 g (7 lb 6.3 oz) F Vag-Spont EPI N AMARJIT      Birth Comments: scale 4   4 Term 11 39w0d  2807 g (6 lb 3 oz) F Vag-Spont EPI N AMARJIT   3 Term 05/22/10 39w0d  2892 g (6 lb 6 oz) F Vag-Spont EPI N AMARJIT   2 Term 09 40w0d  3402 g (7 lb 8 oz) M Vag-Spont EPI N AMARJIT   1 SAB  9w0d    SAB         Obstetric Comments   GDM only w prior preg // ap 2024            ROS:   Review of Systems - General ROS: negative  Psychological ROS: negative  Endocrine ROS: negative  Breast ROS: negative  Respiratory ROS: negative  Cardiovascular ROS: negative  Gastrointestinal ROS: negative  Genito-Urinary ROS: negative  Musculoskeletal ROS: negative  Neurological ROS: negative  Dermatological ROS:  negative    Objective  Physical Exam:   Vitals:    25 1041   BP: 112/76       Uterine Size: not examined, US today  FHT: 110-160 BPM    General appearance - alert, well appearing, and in no distress  Mental status - alert, oriented to person, place, and time  Abdomen- Soft, Gravid uterus, non-tender to palpation  Musculoskeletal: negative for - gait disturbance or joint pain  Extremeties: negative swelling or cyanosis   Dermatological: negative rashes or skin lesions       Assessment/Plan:   Diagnoses and all orders for this visit:    1. High risk pregnancy, antepartum (Primary)  Assessment & Plan:  PROBLEM LIST/PLAN:   Presumed pregest DM- see separate  AMA 35yo- NIPS neg, MFM consult for anatomy-completed wnl   [place] [facility] [place] at 28 weeks  Weekly antepartum testing at 32 to 34 weeks  Delivery 39+  Anemia of preg- Iron QOD Rx 2025   Short interval pregnancy - monitor for  labor    UDS positive for THC at new OB- pt stopped w positive preg test    Orders:  -     POC Urinalysis Dipstick    2. Pre-existing type 2 diabetes mellitus during pregnancy, antepartum  Assessment & Plan:  Elev early glucola, Hx of GDM, declined 3-hour.  Checking blood sugars 4 times a day for remainder of preg per [facility]  S/p ophthalmology- wnl Dec 2024  S/p 24-hour urine protein 2024 98mg  BS reviewed and overall WNL-25    Orders:  -     US Ob 14 + Weeks Single or First Gestation; Future    3. Gastroesophageal reflux disease without esophagitis    4. Short interval between pregnancies affecting pregnancy, antepartum    5. Excessive fetal growth affecting management of pregnancy, antepartum, single or unspecified fetus  Assessment & Plan:  Growth US 25-Growth 91%, AC 90%, EFW 5lb8oz  Recommend repeat in 3-4 wees       6. Polyhydramnios in third trimester complication, single or unspecified fetus  Assessment & Plan:  US 25-Tiffanie 27cm               Counseling:   OB precautions,  leaking, VB, maynor landon vs PTL/Labor  FKC  HTN precautions reviewed: HA, vision change, RUQ/epigastric pain, edema  Round Ligament Pain:  The uterus has several ligaments which provide support and keep the uterus in place. As the  uterus grows these ligaments are pulled and stretched which often causes sharp stabbing like pain in the inguinal area.   You may find a pregnancy support band helpful. Changing positions may also help. Yoga is a great way to cope with round ligament and low back pain in pregnancy.    Massage may also help with low back pain   Things to Consider at this Point in your Pregnancy:  Some women experience swelling in their feet during pregnancy. Compression stockings may help  Drink plenty of water and stay active   Make sure you are eating frequent small meals, nuts are a wonderful snack to keep with you            Return in about 2 weeks (around 2/7/2025) for Routine OB visit.      We have gone over prenatal care to include the timing and content of visits. I informed her how to contact the office and/or on call person in the event of any problems and encouraged her to do so when she feels it is necessary.  We then spent time answering her questions which she indicated were answered to her satisfaction.    Shirley Gonzalez,   1/24/2025 12:09 EST

## 2025-01-24 NOTE — ASSESSMENT & PLAN NOTE
PROBLEM LIST/PLAN:   Presumed pregest DM- see separate  AMA 35yo- NIPS neg, MFM consult for anatomy-completed wnl   Serial ultrasounds for growth at [facility] starting at 28 weeks  Weekly antepartum testing at 32 to 34 weeks  Delivery 39+  Anemia of preg- Iron QOD Rx 2025   Short interval pregnancy - monitor for  labor    UDS positive for THC at new OB- pt stopped w positive preg test

## 2025-01-27 ENCOUNTER — HOSPITAL ENCOUNTER (OUTPATIENT)
Dept: LABOR AND DELIVERY | Facility: HOSPITAL | Age: 35
Discharge: HOME | End: 2025-01-27
Payer: MEDICAID

## 2025-01-27 ENCOUNTER — HOSPITAL ENCOUNTER (OUTPATIENT)
Facility: HOSPITAL | Age: 35
Discharge: HOME | End: 2025-01-27
Attending: OBSTETRICS & GYNECOLOGY | Admitting: OBSTETRICS & GYNECOLOGY
Payer: MEDICAID

## 2025-01-27 VITALS
HEART RATE: 100 BPM | SYSTOLIC BLOOD PRESSURE: 109 MMHG | RESPIRATION RATE: 18 BRPM | TEMPERATURE: 97.8 F | OXYGEN SATURATION: 97 % | DIASTOLIC BLOOD PRESSURE: 67 MMHG

## 2025-01-27 PROCEDURE — 59025 FETAL NON-STRESS TEST: CPT

## 2025-01-27 NOTE — NON STRESS TEST
Obstetrical Non-stress Test Interpretation     Name:  Patsy Motley  MRN: [id number]    34 y.o. female  at 33w0d    Indication: GDM      Fetal Assessment  Fetal Movement: active  Fetal HR Assessment Method: external  Fetal HR (beats/min): 140  Fetal HR Baseline: normal range  Fetal HR Variability: moderate (amplitude range 6 to 25 bpm)  Fetal HR Accelerations: greater than/equal to 15 bpm, lasting at least 15 seconds  Fetal HR Decelerations: absent  Sinusoidal Pattern Present: absent    Pulse 93   Temp 97.8 °F (36.6 °C) (Oral)   Resp 18   LMP 06/10/2024   SpO2 97%     Reason for test: Diabetes  Date of Test: 2025  Time frame of test: 4050-6390  RN NST Interpretation: Reactive      Albina Arroyo RN  2025  14:19 EST

## 2025-01-30 ENCOUNTER — HOSPITAL ENCOUNTER (OUTPATIENT)
Facility: HOSPITAL | Age: 35
Discharge: HOME | End: 2025-01-30
Attending: OBSTETRICS & GYNECOLOGY | Admitting: OBSTETRICS & GYNECOLOGY
Payer: MEDICAID

## 2025-01-30 ENCOUNTER — HOSPITAL ENCOUNTER (OUTPATIENT)
Dept: LABOR AND DELIVERY | Facility: HOSPITAL | Age: 35
Discharge: HOME | End: 2025-01-30
Payer: MEDICAID

## 2025-01-30 VITALS
HEART RATE: 88 BPM | DIASTOLIC BLOOD PRESSURE: 67 MMHG | SYSTOLIC BLOOD PRESSURE: 111 MMHG | OXYGEN SATURATION: 98 % | RESPIRATION RATE: 18 BRPM

## 2025-01-30 PROCEDURE — 59025 FETAL NON-STRESS TEST: CPT

## 2025-01-30 PROCEDURE — 59025 FETAL NON-STRESS TEST: CPT | Performed by: OBSTETRICS & GYNECOLOGY

## 2025-01-30 NOTE — NON STRESS TEST
Obstetrical Non-stress Test Interpretation     Name:  Patsy Motley  MRN: [id number]    34 y.o. female  at 33w3d    Indication: gestational diabetes       Fetal Assessment  Fetal HR Assessment Method: external  Fetal HR (beats/min): 130  Fetal HR Baseline: normal range  Fetal HR Variability: moderate (amplitude range 6 to 25 bpm)  Fetal HR Accelerations: greater than/equal to 15 bpm, lasting at least 15 seconds  Fetal HR Decelerations: absent  Sinusoidal Pattern Present: absent    /67 (BP Location: Right arm)   Pulse 88   Resp 18   LMP 06/10/2024   SpO2 98%     Reason for test: Diabetes (GESTATIONAL)  Date of Test: 2025  Time frame of test: 5302-8209  RN NST Interpretation: Reactive      Dianelys Valdez RN  2025  13:01 EST

## 2025-02-03 ENCOUNTER — HOSPITAL ENCOUNTER (OUTPATIENT)
Dept: LABOR AND DELIVERY | Facility: HOSPITAL | Age: 35
Discharge: HOME | End: 2025-02-03
Payer: MEDICAID

## 2025-02-03 ENCOUNTER — HOSPITAL ENCOUNTER (OUTPATIENT)
Facility: HOSPITAL | Age: 35
Discharge: HOME | End: 2025-02-03
Attending: OBSTETRICS & GYNECOLOGY | Admitting: OBSTETRICS & GYNECOLOGY
Payer: MEDICAID

## 2025-02-03 VITALS — HEART RATE: 92 BPM | SYSTOLIC BLOOD PRESSURE: 117 MMHG | DIASTOLIC BLOOD PRESSURE: 74 MMHG | RESPIRATION RATE: 16 BRPM

## 2025-02-03 PROCEDURE — 59025 FETAL NON-STRESS TEST: CPT

## 2025-02-03 NOTE — NON STRESS TEST
LIZZIE Contreras   OB NST Note    2/3/2025   Name:  Patsy Motley  MRN: [id number]    Subjective:  34 y.o.  at 34w0d    Indication: Pre-gestational DM    NST:   Baseline: 135  Variability:   Moderate/Normal (amplitude 6-25 bpm)  Accelerations: Present (32 weeks+) 15 x 15 bpm  Decelerations: Absent   Contractions:  Not regular    NST interpretation: reactive    Documented Vitals    25 1325   BP: 117/74   Pulse: 92   Resp: 16         Lab Results (last 24 hours)       ** No results found for the last 24 hours. **          Assessment:  34 y.o.  AT 34w0d  Pre-gestational DM    Plan:   OB Precautions, FKC, Keep scheduled NSTs, Keep office visit      Electronically signed by Tobias Acosta MD, 25, 4:43 PM EST.

## 2025-02-03 NOTE — NON STRESS TEST
Obstetrical Non-stress Test Interpretation     Name:  Patsy Motley  MRN: [id number]    34 y.o. female  at 34w0d    Indication: pre-existing Type 2 diabetes       Fetal Movement: active  Fetal HR Assessment Method: external  Fetal HR (beats/min): 135  Fetal HR Baseline: normal range  Fetal HR Variability: moderate (amplitude range 6 to 25 bpm)  Fetal HR Accelerations: greater than/equal to 15 bpm, lasting at least 15 seconds  Fetal HR Decelerations: absent  Sinusoidal Pattern Present: absent    /74 (BP Location: Right arm, Patient Position: Sitting)   Pulse 92   Resp 16   LMP 06/10/2024     Reason for test: Diabetes  Date of Test: 2/3/2025  Time frame of test: 6981-3315  RN NST Interpretation: Reactive      Laly Gibson RN  2/3/2025  13:39 EST

## 2025-02-04 NOTE — PROGRESS NOTES
OB FOLLOW UP      Chief Complaint   Patient presents with    Routine Prenatal Visit     Subjective:   No complaints.  Denies VB, leaking fluid, current regular cramping or contractions.    Objective:  /77   Wt 68 kg (150 lb)   LMP 06/10/2024   BMI 27.44 kg/m²  11.3 kg (25 lb) Body mass index is 27.44 kg/m².  Chaperone present during pelvic exam if performed.  See OB flow sheet for fundal height (not performed if US day of OV), FHT, edema, cvx exam if performed, and Upro/Uglu.       Assessment and Plan:  34w4d     Diagnoses and all orders for this visit:    1. High-risk pregnancy in third trimester (Primary)  Overview:  EVERETT finalized: 3/17/25 per LMP, 10-week US and ACOG    CF negative, NIPS negative.  AFP declined    COVID: Recommended, declines  Flu: Recommended, declines  Tdap: Vaccinated    1hr Glucola: NA, diabetic  FU TPA 28weeks: Neg  ? Desires Sterilization:  desires if CS, filshie clips pw 1/10/2025     Anatomy US: M Dr. Fofana 10/18/2024 AC 78%, anterior placenta, variable presentation, suboptimal anatomic survey- FU MFM  FU US: [facility] 24 breech, EFW 55%, AC 81%, anatomy within normal limits and completed  [place] [facility] [place], EFW 85%, AC 84%, TANNER 19, gd1 placenta  FU US: [facility] 25 cephalic, LGA, EFW 91%, AC 90%, polyhydramnios (27cm)  FU US: [facility] [place]    PROBLEM LIST/PLAN:   Presumed pregest DM- see separate  Polyhydramnios  AMA 33yo- NIPS neg, MFM consult for anatomy-completed wnl   Serial ultrasounds for growth   Weekly antepartum testing   Delivery 39+  Anemia of preg- Iron QOD Rx - cont.  2025 recheck [facility] at [facility] OV  Short interval pregnancy - monitor for  labor    UDS positive for THC at new OB- pt stopped w positive preg test    Orders:  -     POC Urinalysis Dipstick    2. Pre-existing type 2 diabetes mellitus during pregnancy, antepartum  Overview:  No meds  Elev early glucola, Hx of GDM, declined 3-hour.  Checking blood sugars 4 times a day for remainder of  "preg per MFM  S/p ophthalmology- wnl Dec 2024  S/p 24-hour urine protein Nov 2024 98mg  Baseline creatinine clearance- wnl  Baseline EKG- wnl [id number]    10/25/2024 F 82-91, 2hr PP  all wnl  11/15/2024 F 87-97, 2hr PP wnl  12/18/24 F 88-98 (only 2 elevated), 2 elevated PP with all others wnl, states diet related. Advised dietary modifications  12/26/2024 no BS log today.  F \"85\", 2PP \"highest 120\"  1/10/2025 no BS log today.  F\"85\", 2hr PP \"highest is after dinner 111-120\", had one elevation 131 (pasta)  2/7/2025 no BS log today.  F \"85-93\", B and L \"<115\", D \"occas high 130, 160, very uncommon\".    Rec upload to my chart, might need Tx.  Reviewed R/B metformin or insulin, pt desires insulin if needed.  Risks of uncontrolled BS reviewed.        Plan:  Ultrasounds for growth   Antepartum testing at 32-34 weeks q week NST Mon    Orders:  -     Fetal Nonstress Test; Standing    Other orders  -     ferrous sulfate 325 (65 FE) MG tablet; Take 1 tablet by mouth Every Other Day.  Dispense: 15 tablet; Refill: 2  -     Prenatal MV-Min-Fe Fum-FA-DHA (Prenatal Multivitamin + DHA) 28-0.8 & 200 MG misc; Take 1 tablet by mouth Daily.  Dispense: 90 each; Refill: 3      Counseling:    OB precautions, leaking, VB, maynor landon vs PTL/Labor  FKC  DIABETES discussed.  Importance of strict BS control (goals <95F & <120 2hr PP).  Record and bring all BS to qOV (provided log).  Risks of inadequate control NLT macrosomia, shoulder dystocia +/- perm nn damage/fractures, maternal lacs/dyspareunia, incontinence, prolapse, CS, hemorrhage/transfusion.  Message me on my chart and upload BS logs today to determine if tx needed.    DIABETIC TREATMENT options briefly reviewed.  ACOG recommendations of insulin as first line, consider metformin for patients who can't or won't use insulin.  Lack of long term safety data on Metformin make it a second choice, also some data suggest increase PTB.  Patient has decided on insulin if needed after " review of BS      Reassuring pregnancy progress. Questions answered.  Continue PNV.  Importance of healthy eating, obtaining sufficient sleep, and staying active unless hypertensive- activity modified as directed.    Return in about 2 weeks (around 2/21/2025) for FU OB, then weekly to [facility]ERETT.            Marisa Delacruz,   02/07/2025    [facility]  [facility] [place] [address]  Dept: [phone]  Dept Fax: [phone]  Loc: [phone]  Loc Fax: [phone]

## 2025-02-06 ENCOUNTER — HOSPITAL ENCOUNTER (OUTPATIENT)
Facility: HOSPITAL | Age: 35
Discharge: HOME | End: 2025-02-06
Attending: OBSTETRICS & GYNECOLOGY | Admitting: OBSTETRICS & GYNECOLOGY
Payer: MEDICAID

## 2025-02-06 ENCOUNTER — PATIENT OUTREACH (OUTPATIENT)
Dept: LABOR AND DELIVERY | Facility: HOSPITAL | Age: 35
End: 2025-02-06
Payer: MEDICAID

## 2025-02-06 ENCOUNTER — HOSPITAL ENCOUNTER (OUTPATIENT)
Dept: LABOR AND DELIVERY | Facility: HOSPITAL | Age: 35
Discharge: HOME | End: 2025-02-06
Payer: MEDICAID

## 2025-02-06 VITALS
DIASTOLIC BLOOD PRESSURE: 64 MMHG | SYSTOLIC BLOOD PRESSURE: 108 MMHG | RESPIRATION RATE: 18 BRPM | HEART RATE: 90 BPM | OXYGEN SATURATION: 96 %

## 2025-02-06 PROBLEM — O09.299 HISTORY OF GESTATIONAL DIABETES IN PRIOR PREGNANCY, CURRENTLY PREGNANT: Status: RESOLVED | Noted: 2023-08-03 | Resolved: 2025-02-06

## 2025-02-06 PROBLEM — Z86.32 HISTORY OF GESTATIONAL DIABETES IN PRIOR PREGNANCY, CURRENTLY PREGNANT: Status: RESOLVED | Noted: 2023-08-03 | Resolved: 2025-02-06

## 2025-02-06 PROCEDURE — 59025 FETAL NON-STRESS TEST: CPT

## 2025-02-06 NOTE — NON STRESS TEST
Obstetrical Non-stress Test Interpretation     Name:  Patsy Motley  MRN: [id number]    34 y.o. female  at 34w3d    Indication: Diabetes      Fetal Assessment  Fetal Movement: active  Fetal HR Assessment Method: external  Fetal HR (beats/min): 135  Fetal HR Baseline: normal range  Fetal HR Variability: moderate (amplitude range 6 to 25 bpm)  Fetal HR Accelerations: greater than/equal to 15 bpm, lasting at least 15 seconds, episodic  Fetal HR Decelerations: absent  Sinusoidal Pattern Present: absent    /64 (BP Location: Right arm, Patient Position: Lying)   Pulse 90   Resp 18   LMP 06/10/2024   SpO2 96%     Reason for test: Diabetes  Date of Test: 2025  Time frame of test: 1320 - 1411  RN NST Interpretation: Reactive      Michelle Izaguirre RN  2025  14:15 EST

## 2025-02-06 NOTE — OUTREACH NOTE
[facility]  Enrollment    Current Estimated Gestational Age: 34w3d    Questions/Answers      Flowsheet Row Responses   Would like to participate? Yes   Date of Intake Visit 02/06/25        [facility]  Intake    Current Estimated Gestational Age: 34w3d    Intake Assessment      Flowsheet Row Responses   Best Method for Contacting Cell   Currently Employed No   Able to keep appointments as scheduled Yes   Gender(s) and Name(s) Girl   Resources Presently Utilizing: None   Maternal Warning Signs Provided   Other: Provided   Other Education [facility] Benefits, Insurance benefits/Incentives, HANDS, Birth Plan, How to find a pediatrician            Learning Assessment      Flowsheet Row Responses   Relationship Patient   Does the learner have any barriers to learning? No Barriers   What is the preferred language of the learner for medical teaching? English   Is an  required? No            [facility]  FOB:   Feeding Plan: breast feeding    No current concerns with food, housing or transportation.  Encouraged to reach out for any questions, needs or concerns.    Tobacco, Alcohol, and Drug History     reports that she has quit smoking. Her smoking use included cigarettes. She started smoking about 4 years ago. She does not have any smokeless tobacco history on file.   reports that she does not currently use alcohol.   reports that she does not currently use drugs after having used the following drugs: Marijuana.    Darlene Hodge RN  Maternity Nurse Navigator    2/6/2025, 13:56 EST

## 2025-02-07 ENCOUNTER — ROUTINE PRENATAL (OUTPATIENT)
Dept: OBSTETRICS AND GYNECOLOGY | Facility: CLINIC | Age: 35
End: 2025-02-07
Payer: MEDICAID

## 2025-02-07 VITALS — SYSTOLIC BLOOD PRESSURE: 121 MMHG | WEIGHT: 150 LBS | DIASTOLIC BLOOD PRESSURE: 77 MMHG | BODY MASS INDEX: 27.44 KG/M2

## 2025-02-07 DIAGNOSIS — O09.93 HIGH-RISK PREGNANCY IN THIRD TRIMESTER: Primary | ICD-10-CM

## 2025-02-07 DIAGNOSIS — O24.119 PRE-EXISTING TYPE 2 DIABETES MELLITUS DURING PREGNANCY, ANTEPARTUM: ICD-10-CM

## 2025-02-07 LAB
GLUCOSE UR STRIP-MCNC: NEGATIVE MG/DL
PROT UR STRIP-MCNC: NEGATIVE MG/DL

## 2025-02-07 RX ORDER — CHOLECALCIFEROL (VITAMIN D3) 50 MCG
1 TABLET ORAL DAILY
Qty: 90 EACH | Refills: 3 | Status: SHIPPED | OUTPATIENT
Start: 2025-02-07

## 2025-02-07 RX ORDER — FERROUS SULFATE 325(65) MG
325 TABLET ORAL EVERY OTHER DAY
Qty: 15 TABLET | Refills: 2 | Status: SHIPPED | OUTPATIENT
Start: 2025-02-07

## 2025-02-10 ENCOUNTER — HOSPITAL ENCOUNTER (OUTPATIENT)
Dept: LABOR AND DELIVERY | Facility: HOSPITAL | Age: 35
Discharge: HOME | End: 2025-02-10
Payer: MEDICAID

## 2025-02-10 ENCOUNTER — HOSPITAL ENCOUNTER (OUTPATIENT)
Facility: HOSPITAL | Age: 35
Discharge: HOME | End: 2025-02-10
Attending: OBSTETRICS & GYNECOLOGY | Admitting: OBSTETRICS & GYNECOLOGY
Payer: MEDICAID

## 2025-02-10 VITALS — DIASTOLIC BLOOD PRESSURE: 73 MMHG | OXYGEN SATURATION: 98 % | HEART RATE: 97 BPM | SYSTOLIC BLOOD PRESSURE: 120 MMHG

## 2025-02-10 PROCEDURE — 59025 FETAL NON-STRESS TEST: CPT

## 2025-02-10 NOTE — SIGNIFICANT NOTE
02/10/25 1542   Nonstress Test   Reason for NST Diabetes;Polyhydramnios  (AMA)   Acoustic Stimulator No   Uterine Irritability No   Contractions Irregular   Fetal Assessment   Fetal Movement active   Fetal HR Assessment Method external   Fetal HR (beats/min) 140  (140s)   Fetal HR Baseline normal range   Fetal HR Variability moderate (amplitude range 6 to 25 bpm)   Fetal HR Accelerations episodic;greater than/equal to 15 bpm;lasting at least 15 seconds   Fetal HR Decelerations absent   Sinusoidal Pattern Present absent   Fetal HR Tracing Category Category I   Interpretation A   Nonstress Test Interpretation A Reactive

## 2025-02-10 NOTE — NON STRESS TEST
Obstetrical Non-stress Test Interpretation     Name:  Patsy Motley  MRN: [id number]    34 y.o. female  at 35w0d    Indication: pre existing type 2 dm      Fetal Assessment  Fetal Movement: active  Fetal HR Assessment Method: external  Fetal HR (beats/min): 140  Fetal HR Baseline: normal range  Fetal HR Variability: moderate (amplitude range 6 to 25 bpm)  Fetal HR Accelerations: greater than/equal to 15 bpm, lasting at least 15 seconds  Fetal HR Decelerations: absent    /73   Pulse 97   LMP 06/10/2024   SpO2 98%     Reason for test: Diabetes  Date of Test: 2/10/2025  Time frame of test: 1630-2948  RN NST Interpretation: Reactive      Darlene Augustin  2/10/2025  14:35 EST

## 2025-02-17 ENCOUNTER — HOSPITAL ENCOUNTER (OUTPATIENT)
Facility: HOSPITAL | Age: 35
Discharge: HOME | End: 2025-02-17
Attending: OBSTETRICS & GYNECOLOGY | Admitting: OBSTETRICS & GYNECOLOGY
Payer: MEDICAID

## 2025-02-17 ENCOUNTER — HOSPITAL ENCOUNTER (OUTPATIENT)
Dept: LABOR AND DELIVERY | Facility: HOSPITAL | Age: 35
Discharge: HOME | End: 2025-02-17
Payer: MEDICAID

## 2025-02-17 VITALS
DIASTOLIC BLOOD PRESSURE: 73 MMHG | HEART RATE: 102 BPM | TEMPERATURE: 97.6 F | SYSTOLIC BLOOD PRESSURE: 114 MMHG | OXYGEN SATURATION: 98 % | RESPIRATION RATE: 18 BRPM

## 2025-02-17 PROCEDURE — 59025 FETAL NON-STRESS TEST: CPT

## 2025-02-17 NOTE — NON STRESS TEST
Obstetrical Non-stress Test Interpretation     Name:  Patsy Motley  MRN: [id number]    34 y.o. female  at 36w0d    Indication: GDM      Fetal Assessment  Fetal HR Assessment Method: external  Fetal HR (beats/min): 140  Fetal HR Baseline: normal range  Fetal HR Variability: moderate (amplitude range 6 to 25 bpm)  Fetal HR Accelerations: greater than/equal to 15 bpm, lasting at least 15 seconds  Fetal HR Decelerations: absent  Sinusoidal Pattern Present: absent    /73   Pulse 102   Temp 97.6 °F (36.4 °C) (Oral)   Resp 18   LMP 06/10/2024   SpO2 98%     Reason for test: Diabetes (Gestational)  Date of Test: 2025  Time frame of test: 0071-9569  RN NST Interpretation: Reactive      Quita Jackson RN  2025  14:03 EST

## 2025-02-17 NOTE — PROGRESS NOTES
OB FOLLOW UP      Chief Complaint   Patient presents with    Routine Prenatal Visit     Subjective:   No complaints.  Denies VB, leaking fluid, current regular cramping or contractions.    Objective:  /84   Wt 68.9 kg (152 lb) Comment: Simultaneous filing. User may be unaware of other data.  LMP 06/10/2024   BMI 27.80 kg/m²  12.2 kg (27 lb) Body mass index is 27.8 kg/m².  Chaperone present during pelvic exam if performed.  See OB flow sheet for fundal height (not performed if US day of OV), FHT, edema, cvx exam if performed, and Upro/Uglu.   GBS RV swab performed today    Assessment and Plan:  36w3d     Diagnoses and all orders for this visit:    1. High risk pregnancy, antepartum (Primary)  Overview:  EVERETT finalized: 3/17/25 per LMP, 10-week US and ACOG    CF negative, NIPS negative.  AFP declined    COVID: Recommended, declines  Flu: Recommended, declines  Tdap: Vaccinated    1hr Glucola: NA, diabetic  FU TPA 28weeks: Neg  ? Desires Sterilization:  desires if CS, filshie clips pw 1/10/25    Anatomy US: M Dr. Fofana 10/18/2024 AC 78%, anterior placenta, variable presentation, suboptimal anatomic survey- FU MFM  FU US: [facility] 24 breech, EFW 55%, AC 81%, anatomy within normal limits and completed  FU US: [facility] 24 breech, EFW 85%, AC 84%, TANNER 19, gd1 placenta  FU US: [facility] 25 cephalic, LGA, EFW 91%, AC 90%, polyhydramnios (27cm)  FU US: [facility] [place] 25 VTX, 7#15oz, 96%, AC >99%, TANNER 25.4,  gd 2 placenta    PROBLEM LIST/PLAN:   Presumed pregest DM- see separate  Polyhydramnios  AMA 35yo- NIPS neg, MFM consult for anatomy-completed wnl   Serial ultrasounds for growth   Antepartum testing   Delivery 39+  Anemia of preg- Iron QOD Rx - cont.  2025 recheck  Short interval pregnancy - monitor for  labor    UDS positive for THC at new OB- pt stopped w positive preg test    Orders:  -     Group B Strep Molecular by PCR - Swab, Vaginal/Rectum  -     POC Urinalysis Dipstick    2. Pre-existing type  "2 diabetes mellitus during pregnancy, antepartum  Overview:  2/20/2025 started on metformin 500mg w lunch    Elev early glucola, Hx of GDM, declined 3-hour.  Checking blood sugars 4 times a day for remainder of preg per MFM  S/p ophthalmology- wnl Dec 2024  S/p 24-hour urine protein Nov 2024 98mg  Baseline creatinine clearance- wnl  Baseline EKG- wnl Jan2025    Plan:  Ultrasounds for growth   2/20/2025 increase to biweek NST q Mon Thu    10/25/2024 F 82-91, 2hr PP  all wnl  11/15/2024 F 87-97, 2hr PP wnl  12/18/24 F 88-98 (only 2 elevated), 2 elevated PP with all others wnl, states diet related. Advised dietary modifications  12/26/2024 no BS log today.  F \"85\", 2PP \"highest 120\"  1/10/2025 no BS log today.  F\"85\", 2hr PP \"highest is after dinner 111-120\", had one elevation 131 (pasta)  2/7/2025 no BS log today.  F \"85-93\", B and L \"<115\", D \"occas high 130, 160, very uncommon\".    Rec upload to my chart, might need Tx.  Reviewed R/B metformin or insulin, pt desires insulin if needed.  Risks of uncontrolled BS reviewed.    2/20/2025 No BS log today.  F \"85-90\", 2hr B \"<110\", 2hr L \"<115\", 2hr D \"120, occas higher, not more than 25% elev, highest 132\".  Importance bring BS log, rec start tx metformin.    Orders:  -     Fetal Nonstress Test; Standing  -     metFORMIN (GLUCOPHAGE) 500 MG tablet; Take 1 tablet by mouth Daily With Lunch.  Dispense: 30 tablet; Refill: 1    3. Polyhydramnios in third trimester complication, single or unspecified fetus  -     Fetal Nonstress Test; Standing    4. Fetal size inconsistent with dates  -     US Ob 14 + Weeks Single or First Gestation; Future    5. Anemia during pregnancy in third trimester  -     CBC (No Diff)    6. Macrosomia of fetus affecting management of mother in third trimester, single or unspecified fetus      Counseling:    OB precautions, leaking, VB, maynor landon vs PTL/Labor  FKC  GESTATIONAL DIABETES discussed.  Importance of strict BS control (goals <95F & " <120 2hr PP).  Record and bring all BS to qOV (provided log).  Risks of inadequate control NLT macrosomia, shoulder dystocia +/- perm nn damage/fractures/O2 deprivation/brain damage, maternal lacs/dyspareunia, incontinence, prolapse, CS, hemorrhage/transfusion, childhood obesity/DM.    GESTATIONAL DIABETIC TREATMENT options reviewed.  ACOG recommendations of insulin as first line, consider metformin for patients who can't or won't use insulin.  With not having a blood sugar log to see, however with the findings of LGA and polyhydramnios, I suspect they need to be treated.  Would recommend starting with metformin.  Importance of bringing blood sugar log, recommend uploaded to her MyChart so I can review.  Patient agrees.  Suspected MACROSOMIA discussed.  US is approx +/- 1lb. with subsequent fetal growth until delivery.  Risks of  NLT: Maternal risks vaginal lacerations, pelvic organ prolapse, incontinence of urine or stool, hemorrhage, blood transfusion, .  Infant risks NLT shoulder dystocia, bone fracture, permanent nerve damage limiting lifelong mobility, lack of oxygen including permanent disability, rarely death.  Questions answered.  Secondary to suspected poorly controlled diabetes, will recheck an ultrasound in 2 weeks to determine route of delivery.      Reassuring pregnancy progress. Questions answered.  Continue PNV.  Importance of healthy eating, obtaining sufficient sleep, and staying active unless hypertensive- activity modified as directed.    Return for FU OB q1wk to EVERETT/Delivery, growth US 2weeks.            Marisa Delacruz,   2025    [facility]  [facility] [place] [address]  Dept: [phone]  Dept Fax: [phone]  Loc: [phone]  Loc Fax: [phone]

## 2025-02-20 ENCOUNTER — TELEPHONE (OUTPATIENT)
Dept: OBSTETRICS AND GYNECOLOGY | Facility: CLINIC | Age: 35
End: 2025-02-20
Payer: MEDICAID

## 2025-02-20 ENCOUNTER — ROUTINE PRENATAL (OUTPATIENT)
Dept: OBSTETRICS AND GYNECOLOGY | Facility: CLINIC | Age: 35
End: 2025-02-20
Payer: MEDICAID

## 2025-02-20 VITALS — DIASTOLIC BLOOD PRESSURE: 84 MMHG | SYSTOLIC BLOOD PRESSURE: 119 MMHG | BODY MASS INDEX: 27.8 KG/M2 | WEIGHT: 152 LBS

## 2025-02-20 DIAGNOSIS — O26.849 FETAL SIZE INCONSISTENT WITH DATES: ICD-10-CM

## 2025-02-20 DIAGNOSIS — O09.90 HIGH RISK PREGNANCY, ANTEPARTUM: Primary | ICD-10-CM

## 2025-02-20 DIAGNOSIS — O40.3XX0 POLYHYDRAMNIOS IN THIRD TRIMESTER COMPLICATION, SINGLE OR UNSPECIFIED FETUS: ICD-10-CM

## 2025-02-20 DIAGNOSIS — O24.119 PRE-EXISTING TYPE 2 DIABETES MELLITUS DURING PREGNANCY, ANTEPARTUM: ICD-10-CM

## 2025-02-20 DIAGNOSIS — O36.63X0 MACROSOMIA OF FETUS AFFECTING MANAGEMENT OF MOTHER IN THIRD TRIMESTER, SINGLE OR UNSPECIFIED FETUS: ICD-10-CM

## 2025-02-20 DIAGNOSIS — O99.013 ANEMIA DURING PREGNANCY IN THIRD TRIMESTER: ICD-10-CM

## 2025-02-20 LAB
DEPRECATED RDW RBC AUTO: 38.7 FL (ref 37–54)
ERYTHROCYTE [DISTWIDTH] IN BLOOD BY AUTOMATED COUNT: 12.7 % (ref 12.3–15.4)
GLUCOSE UR STRIP-MCNC: NEGATIVE MG/DL
HCT VFR BLD AUTO: 32.8 % (ref 34–46.6)
HGB BLD-MCNC: 11.3 G/DL (ref 12–15.9)
MCH RBC QN AUTO: 29.1 PG (ref 26.6–33)
MCHC RBC AUTO-ENTMCNC: 34.5 G/DL (ref 31.5–35.7)
MCV RBC AUTO: 84.5 FL (ref 79–97)
PLATELET # BLD AUTO: 199 10*3/MM3 (ref 140–450)
PMV BLD AUTO: 9 FL (ref 6–12)
PROT UR STRIP-MCNC: NEGATIVE MG/DL
RBC # BLD AUTO: 3.88 10*6/MM3 (ref 3.77–5.28)
WBC NRBC COR # BLD AUTO: 5.29 10*3/MM3 (ref 3.4–10.8)

## 2025-02-20 PROCEDURE — 85027 COMPLETE CBC AUTOMATED: CPT | Performed by: OBSTETRICS & GYNECOLOGY

## 2025-02-20 PROCEDURE — 87653 STREP B DNA AMP PROBE: CPT | Performed by: OBSTETRICS & GYNECOLOGY

## 2025-02-21 LAB — GP B STREP DNA VAG+RECTUM QL NAA+PROBE: NEGATIVE

## 2025-02-24 ENCOUNTER — TELEPHONE (OUTPATIENT)
Dept: OBSTETRICS AND GYNECOLOGY | Facility: CLINIC | Age: 35
End: 2025-02-24

## 2025-02-24 NOTE — TELEPHONE ENCOUNTER
Caller: Patsy Motley    Relationship to patient: Self    Best call back number: [phone] (home)       Patient is needing: PT IS SCHEDULED FOR AN NST TODAY AND NEEDS TO RESCHEDULE. L&D WOULD LIKE THE OFFICE TO CALL TO RESCHEDULE. SHE CAN DO TOMORROW OR ANY OTHER DAY. PREFERS AROUND 1 PM  BUT IF NOT AVAILABLE THAT'S FINE. SHE WILL SEE THE UPDATED APPT ON MY CHART.

## 2025-02-25 ENCOUNTER — HOSPITAL ENCOUNTER (OUTPATIENT)
Facility: HOSPITAL | Age: 35
Discharge: HOME | End: 2025-02-25
Attending: STUDENT IN AN ORGANIZED HEALTH CARE EDUCATION/TRAINING PROGRAM | Admitting: STUDENT IN AN ORGANIZED HEALTH CARE EDUCATION/TRAINING PROGRAM
Payer: MEDICAID

## 2025-02-25 VITALS — RESPIRATION RATE: 16 BRPM | SYSTOLIC BLOOD PRESSURE: 110 MMHG | DIASTOLIC BLOOD PRESSURE: 73 MMHG | HEART RATE: 89 BPM

## 2025-02-25 PROCEDURE — 59025 FETAL NON-STRESS TEST: CPT

## 2025-02-25 NOTE — NON STRESS TEST
Obstetrical Non-stress Test Interpretation     Name:  Patsy Motley  MRN: [id number]    34 y.o. female  at 37w1d    Indication: Type 2 DM      Fetal Assessment  Fetal Movement: active  Fetal HR Assessment Method: external  Fetal HR (beats/min): 135  Fetal HR Baseline: normal range  Fetal HR Variability: moderate (amplitude range 6 to 25 bpm)  Fetal HR Accelerations: lasting at least 15 seconds, greater than/equal to 15 bpm  Fetal HR Decelerations: absent  Sinusoidal Pattern Present: absent    /73 (BP Location: Right arm, Patient Position: Sitting)   Pulse 89   Resp 16   LMP 06/10/2024     Reason for test: Diabetes  Date of Test: 2025  Time frame of test: 9210-7665  RN NST Interpretation: Abigail Catsillo RN  2025  14:06 EST

## 2025-02-26 DIAGNOSIS — Z34.80 SUPERVISION OF OTHER NORMAL PREGNANCY, ANTEPARTUM: ICD-10-CM

## 2025-02-26 RX ORDER — FAMOTIDINE 20 MG/1
20 TABLET, FILM COATED ORAL 2 TIMES DAILY
OUTPATIENT
Start: 2025-02-26

## 2025-02-26 RX ORDER — PNV NO.95/FERROUS FUM/FOLIC AC 28MG-0.8MG
1 TABLET ORAL DAILY
Qty: 90 TABLET | Refills: 4 | Status: SHIPPED | OUTPATIENT
Start: 2025-02-26

## 2025-02-27 ENCOUNTER — HOSPITAL ENCOUNTER (OUTPATIENT)
Dept: LABOR AND DELIVERY | Facility: HOSPITAL | Age: 35
Discharge: HOME | End: 2025-02-27
Payer: MEDICAID

## 2025-02-27 ENCOUNTER — HOSPITAL ENCOUNTER (OUTPATIENT)
Facility: HOSPITAL | Age: 35
Discharge: HOME | End: 2025-02-27
Attending: OBSTETRICS & GYNECOLOGY | Admitting: OBSTETRICS & GYNECOLOGY
Payer: MEDICAID

## 2025-02-27 VITALS
DIASTOLIC BLOOD PRESSURE: 73 MMHG | OXYGEN SATURATION: 98 % | RESPIRATION RATE: 18 BRPM | HEART RATE: 93 BPM | SYSTOLIC BLOOD PRESSURE: 124 MMHG

## 2025-02-27 PROCEDURE — 59025 FETAL NON-STRESS TEST: CPT

## 2025-02-27 PROCEDURE — 59025 FETAL NON-STRESS TEST: CPT | Performed by: OBSTETRICS & GYNECOLOGY

## 2025-02-27 PROCEDURE — G0463 HOSPITAL OUTPT CLINIC VISIT: HCPCS

## 2025-02-27 NOTE — PROGRESS NOTES
OB FOLLOW UP      Chief Complaint   Patient presents with    Routine Prenatal Visit     Left ear feels like its under water for about 4-5 days, feels clogged, sensitive to noise      Subjective:   Denies VB, leaking fluid, current regular cramping or contractions.    Objective:  /78   Wt 68.5 kg (151 lb)   LMP 06/10/2024   BMI 27.62 kg/m²  11.8 kg (26 lb) Body mass index is 27.62 kg/m².  Chaperone present during pelvic exam if performed.  See OB flow sheet for fundal height (not performed if US day of OV), FHT, edema, cvx exam if performed, and Upro/Uglu.       Assessment and Plan:  37w4d     Diagnoses and all orders for this visit:    1. High-risk pregnancy in third trimester (Primary)  Overview:  EVERETT finalized: 3/17/25 per LMP, 10-week US and ACOG    CF negative, NIPS negative.  AFP declined    COVID: Recommended, declines  Flu: Recommended, declines  Tdap: Vaccinated    1hr Glucola: NA, diabetic  FU TPA 28weeks: Neg  ? Desires Sterilization:  desires if CS, filshie clips pw 1/10/25    Anatomy US: [facility] Dr. Fofana 10/18/2024 AC 78%, anterior placenta, variable presentation, suboptimal anatomic survey- FU [facility]  FU US: [facility] 24 breech, EFW 55%, AC 81%, anatomy within normal limits and completed  FU US: [facility] 24 breech, EFW 85%, AC 84%, TANNER 19, gd1 placenta  FU US: [facility] 25 cephalic, LGA, EFW 91%, AC 90%, polyhydramnios (27cm)  FU US: [facility] 25 VTX, 7#15oz, 96%, AC >99%, TANNER 25.4,  gd 2 placenta  FU US: [facility] 3/6/25    PROBLEM LIST/PLAN:   Presumed pregest DM- see separate  Polyhydramnios  AMA 35yo- NIPS neg, MFM consult for anatomy-completed wnl   Serial ultrasounds for growth   Antepartum testing   Delivery 39+  Anemia of preg- Iron QOD Rx - cont.    Short interval pregnancy - monitor for  labor    UDS positive for THC at new OB- pt stopped w positive preg test    Orders:  -     POC Urinalysis Dipstick    2. Pre-existing type 2 diabetes mellitus during pregnancy,  "antepartum  Overview:  metformin 500mg w lunch    Elev early glucola, Hx of GDM, declined 3-hour.  Checking blood sugars 4 times a day for remainder of preg per MFM  S/p ophthalmology- wnl Dec 2024  S/p 24-hour urine protein 2024 98mg  Baseline creatinine clearance- wnl  Baseline EKG- wnl     Plan:  Ultrasounds for growth   Biweek NST q Mon Thu    10/25/2024 F 82-91, 2hr PP  all wnl  11/15/2024 F 87-97, 2hr PP wnl  24 F 88-98 (only 2 elevated), 2 elevated PP with all others wnl, states diet related. Advised dietary modifications  2024 no BS log today.  F \"85\", 2PP \"highest 120\"  1/10/2025 no BS log today.  F\"85\", 2hr PP \"highest is after dinner 111-120\", had one elevation 131 (pasta)  2025 no BS log today.  F \"85-93\", B and L \"<115\", D \"occas high 130, 160, very uncommon\".    Rec upload to my chart, might need Tx.  Reviewed R/B metformin or insulin, pt desires insulin if needed.  Risks of uncontrolled BS reviewed.    2025 No BS log today.  F \"85-90\", 2hr B \"<110\", 2hr L \"<115\", 2hr D \"120, occas higher, not more than 25% elev, highest 132\".  Importance bring BS log, rec start tx metformin.  2025 All BS wnl except 100 fasting once.  Cont metformin.        Counseling:    OB precautions, leaking, VB, maynor landon vs PTL/Labor  FKC  Suspected MACROSOMIA discussed.  US is approx +/- 1lb. with subsequent fetal growth until delivery.  I do not expect fetus will be estimated at 4500gm or more at next US which is current rec for PCS.  Risks of  NLT: Maternal risks vaginal lacerations, hemorrhage, blood transfusion, .  Infant risks NLT shoulder dystocia, bone fracture, permanent nerve damage limiting lifelong mobility, lack of oxygen including permanent disability, rarely death.  Questions answered.  Pelvis is clinically adequate.  Pt has strong desire for  and declines PCS.  IOL reviewed in detail.  R/B/A/SE/E.  All history reviewed and updated.  Pre-IOL exam " performed.  Length suspected <24hrs.   PLAN: cytotec and cvx cath vs pit and cvx cath depending on cvx exam next week.  All questions answered.  Discussed cytotec not FDA approved but ACOG endorsed. She desires to proceed as planned.  She understands during early am the OB Hospitalist physicians will manage her labor and deliver prn any emergencies.    Rec acute care re ear c/o    Reassuring pregnancy progress. Continue PNV.      Return in about 1 week (around 3/7/2025) for FU OB and midApril Postpartum.            Marisa Delacruz,   02/28/2025    [facility]  [facility] [place] [address]  Dept: [phone]  Dept Fax: [phone]  Loc: [phone]  Loc Fax: [phone]

## 2025-02-27 NOTE — NON STRESS TEST
Obstetrical Non-stress Test Interpretation     Name:  Patsy Motley  MRN: [id number]    34 y.o. female  at 37w3d    Indication: NST/GDM      Fetal Assessment  Fetal Movement: active  Fetal HR Assessment Method: external  Fetal HR (beats/min): 130  Fetal HR Baseline: normal range  Fetal HR Variability: moderate (amplitude range 6 to 25 bpm)  Fetal HR Accelerations: lasting at least 15 seconds  Fetal HR Decelerations: absent    /69 (BP Location: Right arm)   Pulse 93   Resp 18   LMP 06/10/2024   SpO2 98%     Reason for test: OB Triage (NST/GDM)  Date of Test: 2025  Time frame of test: 4737-4994  RN NST Interpretation: Reactive      Akua Mack RN  2025  14:11 EST

## 2025-02-28 ENCOUNTER — ROUTINE PRENATAL (OUTPATIENT)
Dept: OBSTETRICS AND GYNECOLOGY | Facility: CLINIC | Age: 35
End: 2025-02-28
Payer: MEDICAID

## 2025-02-28 VITALS — WEIGHT: 151 LBS | DIASTOLIC BLOOD PRESSURE: 78 MMHG | SYSTOLIC BLOOD PRESSURE: 116 MMHG | BODY MASS INDEX: 27.62 KG/M2

## 2025-02-28 DIAGNOSIS — O09.93 HIGH-RISK PREGNANCY IN THIRD TRIMESTER: Primary | ICD-10-CM

## 2025-02-28 DIAGNOSIS — O24.119 PRE-EXISTING TYPE 2 DIABETES MELLITUS DURING PREGNANCY, ANTEPARTUM: ICD-10-CM

## 2025-02-28 LAB
GLUCOSE UR STRIP-MCNC: NEGATIVE MG/DL
PROT UR STRIP-MCNC: NEGATIVE MG/DL

## 2025-03-06 ENCOUNTER — ROUTINE PRENATAL (OUTPATIENT)
Dept: OBSTETRICS AND GYNECOLOGY | Facility: CLINIC | Age: 35
End: 2025-03-06
Payer: MEDICAID

## 2025-03-06 ENCOUNTER — HOSPITAL ENCOUNTER (OUTPATIENT)
Facility: HOSPITAL | Age: 35
Discharge: HOME | End: 2025-03-06
Attending: OBSTETRICS & GYNECOLOGY | Admitting: OBSTETRICS & GYNECOLOGY
Payer: MEDICAID

## 2025-03-06 ENCOUNTER — HOSPITAL ENCOUNTER (OUTPATIENT)
Dept: LABOR AND DELIVERY | Facility: HOSPITAL | Age: 35
Discharge: HOME | End: 2025-03-06
Payer: MEDICAID

## 2025-03-06 VITALS — DIASTOLIC BLOOD PRESSURE: 79 MMHG | OXYGEN SATURATION: 96 % | SYSTOLIC BLOOD PRESSURE: 119 MMHG | HEART RATE: 91 BPM

## 2025-03-06 VITALS — SYSTOLIC BLOOD PRESSURE: 126 MMHG | WEIGHT: 151 LBS | DIASTOLIC BLOOD PRESSURE: 80 MMHG | BODY MASS INDEX: 27.62 KG/M2

## 2025-03-06 DIAGNOSIS — O40.3XX0 POLYHYDRAMNIOS IN THIRD TRIMESTER COMPLICATION, SINGLE OR UNSPECIFIED FETUS: ICD-10-CM

## 2025-03-06 DIAGNOSIS — O99.013 ANEMIA DURING PREGNANCY IN THIRD TRIMESTER: ICD-10-CM

## 2025-03-06 DIAGNOSIS — O24.119 PRE-EXISTING TYPE 2 DIABETES MELLITUS DURING PREGNANCY, ANTEPARTUM: Primary | ICD-10-CM

## 2025-03-06 DIAGNOSIS — O36.63X0 MACROSOMIA OF FETUS AFFECTING MANAGEMENT OF MOTHER IN THIRD TRIMESTER, SINGLE OR UNSPECIFIED FETUS: ICD-10-CM

## 2025-03-06 DIAGNOSIS — O09.90 HIGH RISK PREGNANCY, ANTEPARTUM: ICD-10-CM

## 2025-03-06 LAB
GLUCOSE UR STRIP-MCNC: NEGATIVE MG/DL
PROT UR STRIP-MCNC: NEGATIVE MG/DL

## 2025-03-06 PROCEDURE — 59025 FETAL NON-STRESS TEST: CPT

## 2025-03-06 NOTE — PROGRESS NOTES
Routine Prenatal Visit     Subjective  Patsy Motley is a 34 y.o.  at 38w3d here for her routine OB visit.   She is taking her prenatal vitamins.Reports no loss of fluid or vaginal bleeding. Patient doing well without any complaints. Pregnancy complicated by:     Patient Active Problem List   Diagnosis    High-risk pregnancy    Short interval between pregnancies affecting pregnancy, antepartum    Antepartum multigravida of advanced maternal age    Positive urine drug screen    Abnormal glucose tolerance test    Pre-existing type 2 diabetes mellitus during pregnancy, antepartum    Maternal care for breech presentation, single gestation    Request for sterilization    Anemia during pregnancy in third trimester    Polyhydramnios in third trimester    Macrosomia of fetus affecting management of mother in third trimester         OB History    Para Term  AB Living   7 5 5   1 5   SAB IAB Ectopic Molar Multiple Live Births   1       0 5      # Outcome Date GA Lbr Tyrone/2nd Weight Sex Type Anes PTL Lv   7 Current            6 Term 24 39w0d 01:38  00:04 3570 g (7 lb 13.9 oz) F Vag-Spont OTHER N AMARJIT   5 Term 22 39w5d 01:27 :16 3354 g (7 lb 6.3 oz) F Vag-Spont EPI N AMARJIT      Birth Comments: scale 4   4 Term 11 39w0d  2807 g (6 lb 3 oz) F Vag-Spont EPI N AMARJIT   3 Term 05/22/10 39w0d  2892 g (6 lb 6 oz) F Vag-Spont EPI N AMARJIT   2 Term 09 40w0d  3402 g (7 lb 8 oz) M Vag-Spont EPI N AMARJIT   1 SAB  9w0d    SAB         Obstetric Comments   GDM only w prior preg // ap 2024            ROS:   Review of Systems - General ROS: negative  Psychological ROS: negative  Endocrine ROS: negative  Breast ROS: negative  Respiratory ROS: negative  Cardiovascular ROS: negative  Gastrointestinal ROS: negative  Genito-Urinary ROS: negative  Musculoskeletal ROS: negative  Neurological ROS: negative  Dermatological ROS: negative    Objective  Physical Exam:   Vitals:    25 1005   BP:  126/80       Uterine Size: not examined, US today  FHT: 110-160 BPM    General appearance - alert, well appearing, and in no distress  Mental status - alert, oriented to person, place, and time  Abdomen- Soft, Gravid uterus, non-tender to palpation  Musculoskeletal: negative for - gait disturbance or joint pain  Extremeties: negative swelling or cyanosis   Dermatological: negative rashes or skin lesions   Declined cervical check today     Assessment/Plan:   Diagnoses and all orders for this visit:    1. Pre-existing type 2 diabetes mellitus during pregnancy, antepartum (Primary)  Assessment & Plan:  BS reviewed and overall WNL      2. Polyhydramnios in third trimester complication, single or unspecified fetus  Assessment & Plan:  Tiffanie 29cm 3/6/25      3. High risk pregnancy, antepartum  -     POC Urinalysis Dipstick    4. Macrosomia of fetus affecting management of mother in third trimester, single or unspecified fetus  Comments:  Growth 87% today, EFW 8lb7oz  IOL scheduled 3/11/25    5. Anemia during pregnancy in third trimester            Counseling:   OB precautions, leaking, VB, maynor landon vs PTL/Labor  FKC  HTN precautions reviewed: HA, vision change, RUQ/epigastric pain, edema  IOL scheduled  Round Ligament Pain:  The uterus has several ligaments which provide support and keep the uterus in place. As the  uterus grows these ligaments are pulled and stretched which often causes sharp stabbing like pain in the inguinal area.   You may find a pregnancy support band helpful. Changing positions may also help. Yoga is a great way to cope with round ligament and low back pain in pregnancy.    Massage may also help with low back pain   Things to Consider at this Point in your Pregnancy:  Some women experience swelling in their feet during pregnancy. Compression stockings may help  Drink plenty of water and stay active   Make sure you are eating frequent small meals, nuts are a wonderful snack to keep with you             Return in 4 weeks (on 4/3/2025) for Postpartum visit.      We have gone over prenatal care to include the timing and content of visits. I informed her how to contact the office and/or on call person in the event of any problems and encouraged her to do so when she feels it is necessary.  We then spent time answering her questions which she indicated were answered to her satisfaction.    Shirley Gonzalez DO  3/6/2025 10:39 EST

## 2025-03-06 NOTE — NON STRESS TEST
Obstetrical Non-stress Test Interpretation     Name:  Patsy Motley  MRN: [id number]    34 y.o. female  at 38w3d    Indication: type 2 dm, polyhydraminos      Fetal Assessment  Fetal Movement: active  Fetal HR Assessment Method: external  Fetal HR (beats/min): 130  Fetal HR Baseline: normal range  Fetal HR Variability: moderate (amplitude range 6 to 25 bpm)  Fetal HR Accelerations: greater than/equal to 15 bpm, lasting at least 15 seconds  Fetal HR Decelerations: absent    /79   Pulse 91   LMP 06/10/2024   SpO2 96%     Reason for test: Polyhydramnios, Diabetes  Date of Test: 3/6/2025  Time frame of test: 8170-8742  RN NST Interpretation: Abigail Augustin  3/6/2025  12:29 EST

## 2025-03-09 ENCOUNTER — PREP FOR SURGERY (OUTPATIENT)
Dept: OTHER | Facility: HOSPITAL | Age: 35
End: 2025-03-09
Payer: MEDICAID

## 2025-03-09 NOTE — H&P
OB HISTORY AND PHYSICAL      SUBJECTIVE:    34 y.o. female  currently at 38w6d [facility] complicated by:      Patient Active Problem List    Diagnosis     Pre-existing type 2 diabetes mellitus during pregnancy, antepartum [O24.119]     High-risk pregnancy [O09.90]     Macrosomia of fetus affecting management of mother in third trimester [O36.63X0]     Polyhydramnios in third trimester [O40.3XX0]     Anemia during pregnancy in third trimester [O99.013]     Request for sterilization [Z30.2]     Maternal care for breech presentation, single gestation [O32.1XX0]     Abnormal glucose tolerance test [R73.09]     Positive urine drug screen [R82.5]     Short interval between pregnancies affecting pregnancy, antepartum [O09.899]     Antepartum multigravida of advanced maternal age [O09.529]        CC/HPI:  Presents with Scheduled IOL.     ROS: No leaking fluid, No vaginal bleeding, No contractions, and Is feeling adequate FM    Past OB History:   OB History    Para Term  AB Living   7 5 5  1 5   SAB IAB Ectopic Molar Multiple Live Births   1    0 5      # Outcome Date GA Lbr Tyrone/2nd Weight Sex Type Anes PTL Lv   7 Current            6 Term 24 39w0d 01:38 / 00:04 3570 g (7 lb 13.9 oz) F Vag-Spont OTHER N AMARJIT   5 Term 22 39w5d 01:27 / 00:16 3354 g (7 lb 6.3 oz) F Vag-Spont EPI N AMARJIT      Birth Comments: scale 4   4 Term 11 39w0d  2807 g (6 lb 3 oz) F Vag-Spont EPI N AMARJIT   3 Term 05/22/10 39w0d  2892 g (6 lb 6 oz) F Vag-Spont EPI N AMARJIT   2 Term 09 40w0d  3402 g (7 lb 8 oz) M Vag-Spont EPI N AMARJIT   1 SAB  9w0d    SAB         Obstetric Comments   GDM only w prior preg // ap 2024         Prenatal Labs:    Prenatal Results       Initial Prenatal Labs       Test Value Reference Range Date Time    Hemoglobin  12.1 g/dL 12.0 - 15.9 24 1132    Hematocrit  35.7 % 34.0 - 46.6 24 1132    Platelets  236 10*3/mm3 140 - 450 08/19/24 1132    Rubella IgG  7.37 index Immune >0.99  08/19/24 1132    Hepatitis B SAg  Non-Reactive  Non-Reactive 08/19/24 1132    Hepatitis C Ab  Non-Reactive  Non-Reactive 08/19/24 1132    RPR  Non-Reactive  Non-Reactive 08/19/24 1132    T. Pallidum Ab   Non-Reactive  Non-Reactive 01/10/25 1043    ABO  O   08/19/24 1132    Rh  Positive   08/19/24 1132    Antibody Screen  Negative   08/19/24 1132    HIV  Non-Reactive  Non-Reactive 08/19/24 1132    Urine Culture  No growth   08/19/24 1132    Gonorrhea  Negative  Negative 08/19/24 1132    Chlamydia  Negative  Negative 08/19/24 1132    TSH        HgB A1c         Varicella IgG        Hemoglobinopathy Fractionation  Comment   11/15/21 1110    Hemoglobinopathy (genetic testing)        Cystic fibrosis         Spinal muscular atrophy        Fragile X                  Fetal testing        Test Value Reference Range Date Time    NIPT        MSAFP        AFP-4                  2nd and 3rd Trimester       Test Value Reference Range Date Time    Hemoglobin (repeated)  11.3 g/dL 12.0 - 15.9 02/20/25 1129       11.2 g/dL 12.0 - 15.9 01/10/25 1043    Hematocrit (repeated)  32.8 % 34.0 - 46.6 02/20/25 1129       32.2 % 34.0 - 46.6 01/10/25 1043    Platelets   199 10*3/mm3 140 - 450 02/20/25 1129       209 10*3/mm3 140 - 450 01/10/25 1043       236 10*3/mm3 140 - 450 08/19/24 1132    1 hour GTT   130 mg/dL 65 - 139 09/27/24 1450    Antibody Screen (repeated)        3rd TM syphilis scrn (repeated)  RPR         3rd TM syphilis scrn (repeated) TP-Ab  Non-Reactive  Non-Reactive 01/10/25 1043    3rd TM syphilis screen TB-Ab (FTA)  Non-Reactive  Non-Reactive 01/10/25 1043    Syphilis cascade test TP-Ab (EIA)        Syphilis cascade TPPA        GTT Fasting        GTT 1 Hr        GTT 2 Hr        GTT 3 Hr        Group B Strep  Negative  Negative 02/20/25 1107              Other testing        Test Value Reference Range Date Time    Parvo IgG         CMV IgG                   Drug Screening       Test Value Reference Range Date Time     Amphetamine Screen        Barbiturate Screen  Negative  Negative 08/19/24 1132    Benzodiazepine Screen  Negative  Negative 08/19/24 1132    Methadone Screen  Negative  Negative 08/19/24 1132    Phencyclidine Screen        Opiates Screen  Negative  Negative 08/19/24 1132    THC Screen  Positive  Negative 08/19/24 1132    Cocaine Screen  Negative  Negative 08/19/24 1132    Propoxyphene Screen        Buprenorphine Screen        Methamphetamine Screen        Oxycodone Screen  Negative  Negative 08/19/24 1132    Tricyclic Antidepressants Screen                   PMHx:    Past Medical History:   Diagnosis Date    Abnormal Pap smear of cervix     hx Gestational diabetes     Pre gestational diabetes mellitus        Medications:  Alcohol Swabs, DHA Omega 3, FreeStyle Lite, Lancets, ferrous sulfate, glucose blood, glucose monitor, metFORMIN, pantoprazole, and prenatal vitamin 28-0.8    Allergies:  No Known Allergies    PSHx:    Past Surgical History:   Procedure Laterality Date    NO PAST SURGERIES         Social History:    reports that she has quit smoking. Her smoking use included cigarettes. She started smoking about 4 years ago. She does not have any smokeless tobacco history on file. She reports that she does not currently use alcohol. She reports that she does not currently use drugs after having used the following drugs: Marijuana.    Family History: Non contributory    Immunizations: See prenatal record for Tdap, Flu, Covid and/or other vaccinations    PHYSICAL EXAM:      Chaperone present during breast and/or pelvic/cervical exam if performed.  General- NAD, alert and oriented, appropriate  Psych- normal mood, good memory  Cardiovascular- Regular rhythm, no murnurs  Respiratory- CTA to bases, no wheezes  Abdomen- Gravid, non tender  Fundus-  Non tender.  Size: consistent with dates  EFW- 9 lbs  US [facility] 3/6/25 8#7oz, TANNER 29   Pelvis-  Adequate   Cervix- 2cm / 30% / -3  Presentation- VTX  Extremities/DTRs- No edema,  bilaterally equal, no signs of DVT    Fetal HR: Doptones 110-160, see last OV note  Contractions: Not complaining of any regular contractions        ASSESSMENT:  38w6d  Scheduled IOL at 39+ weeks  Pregest DM- metformin, well controlled  Polyhydramnios  AMA  Anemia of preg  Short interval preg  Request for sterilization- pw 1/10/25    Lab Results   Component Value Date    STREPGPB Negative 2025       PLAN:  Admit  Delivery:   IOL  Pitocin and cervical catheter  Desires bilateral tubal occlusion if CS  Intrapartum BLOOD SUGAR CHECKS, w SSI, goal BS <120    Plan of care NLT hospital course, R/B/A/potential SE, suspected length 12-24+hrs have been reviewed with patient and any family or friends present, questions answered to her/his/their satisfaction.  Pt desires to proceed as above.    Counseling:The patient was counseled on the risks, benefits and alternatives of Induction.  Risks reviewed, but are not limited to: bleeding, transfusion, fetal intolerance,  (possibly emergent),  and uterine rupture.  She declines expectant management or  and desires induction.  All her questions have been answered to her satisfaction and she desires to proceed.          Electronically signed by Marisa Delacruz DO, 25, 3:40 PM EDT.    [facility]  [address] [place] [address]  Dept: [phone]  Loc: [phone]

## 2025-03-10 RX ORDER — FAMOTIDINE 20 MG/1
20 TABLET, FILM COATED ORAL ONCE AS NEEDED
Status: CANCELLED | OUTPATIENT
Start: 2025-03-10

## 2025-03-10 RX ORDER — FAMOTIDINE 10 MG/ML
20 INJECTION, SOLUTION INTRAVENOUS ONCE AS NEEDED
Status: CANCELLED | OUTPATIENT
Start: 2025-03-10

## 2025-03-10 RX ORDER — NICOTINE POLACRILEX 4 MG
15 LOZENGE BUCCAL
Status: CANCELLED | OUTPATIENT
Start: 2025-03-10

## 2025-03-10 RX ORDER — PROMETHAZINE HYDROCHLORIDE 25 MG/1
25 TABLET ORAL EVERY 6 HOURS PRN
Status: CANCELLED | OUTPATIENT
Start: 2025-03-10

## 2025-03-10 RX ORDER — TERBUTALINE SULFATE 1 MG/ML
0.25 INJECTION SUBCUTANEOUS AS NEEDED
Status: CANCELLED | OUTPATIENT
Start: 2025-03-10

## 2025-03-10 RX ORDER — FAMOTIDINE 10 MG/ML
20 INJECTION, SOLUTION INTRAVENOUS 2 TIMES DAILY PRN
Status: CANCELLED | OUTPATIENT
Start: 2025-03-10

## 2025-03-10 RX ORDER — IBUPROFEN 800 MG/1
800 TABLET, FILM COATED ORAL ONCE AS NEEDED
Status: CANCELLED | OUTPATIENT
Start: 2025-03-10

## 2025-03-10 RX ORDER — SODIUM CHLORIDE 0.9 % (FLUSH) 0.9 %
10 SYRINGE (ML) INJECTION AS NEEDED
Status: CANCELLED | OUTPATIENT
Start: 2025-03-10

## 2025-03-10 RX ORDER — PROMETHAZINE HYDROCHLORIDE 12.5 MG/1
12.5 TABLET ORAL EVERY 6 HOURS PRN
Status: CANCELLED | OUTPATIENT
Start: 2025-03-10

## 2025-03-10 RX ORDER — SODIUM CHLORIDE, SODIUM LACTATE, POTASSIUM CHLORIDE, CALCIUM CHLORIDE 600; 310; 30; 20 MG/100ML; MG/100ML; MG/100ML; MG/100ML
125 INJECTION, SOLUTION INTRAVENOUS CONTINUOUS
Status: CANCELLED | OUTPATIENT
Start: 2025-03-10 | End: 2025-03-12

## 2025-03-10 RX ORDER — OXYTOCIN/0.9 % SODIUM CHLORIDE 30/500 ML
999 PLASTIC BAG, INJECTION (ML) INTRAVENOUS ONCE
Status: CANCELLED | OUTPATIENT
Start: 2025-03-10

## 2025-03-10 RX ORDER — ONDANSETRON 2 MG/ML
4 INJECTION INTRAMUSCULAR; INTRAVENOUS EVERY 6 HOURS PRN
Status: CANCELLED | OUTPATIENT
Start: 2025-03-10

## 2025-03-10 RX ORDER — HYDROCODONE BITARTRATE AND ACETAMINOPHEN 5; 325 MG/1; MG/1
1 TABLET ORAL EVERY 4 HOURS PRN
Refills: 0 | Status: CANCELLED | OUTPATIENT
Start: 2025-03-10 | End: 2025-03-17

## 2025-03-10 RX ORDER — IBUPROFEN 600 MG/1
1 TABLET ORAL
Status: CANCELLED | OUTPATIENT
Start: 2025-03-10

## 2025-03-10 RX ORDER — LIDOCAINE HYDROCHLORIDE 10 MG/ML
0.5 INJECTION, SOLUTION EPIDURAL; INFILTRATION; INTRACAUDAL; PERINEURAL ONCE AS NEEDED
Status: CANCELLED | OUTPATIENT
Start: 2025-03-10

## 2025-03-10 RX ORDER — FAMOTIDINE 20 MG/1
20 TABLET, FILM COATED ORAL 2 TIMES DAILY PRN
Status: CANCELLED | OUTPATIENT
Start: 2025-03-10

## 2025-03-10 RX ORDER — ONDANSETRON 4 MG/1
4 TABLET, ORALLY DISINTEGRATING ORAL EVERY 6 HOURS PRN
Status: CANCELLED | OUTPATIENT
Start: 2025-03-10

## 2025-03-10 RX ORDER — OXYTOCIN/0.9 % SODIUM CHLORIDE 30/500 ML
1-16 PLASTIC BAG, INJECTION (ML) INTRAVENOUS
Status: CANCELLED | OUTPATIENT
Start: 2025-03-10

## 2025-03-10 RX ORDER — METOCLOPRAMIDE HYDROCHLORIDE 5 MG/ML
10 INJECTION INTRAMUSCULAR; INTRAVENOUS EVERY 6 HOURS PRN
Status: CANCELLED | OUTPATIENT
Start: 2025-03-10

## 2025-03-10 RX ORDER — OXYTOCIN/0.9 % SODIUM CHLORIDE 30/500 ML
250 PLASTIC BAG, INJECTION (ML) INTRAVENOUS CONTINUOUS
Status: CANCELLED | OUTPATIENT
Start: 2025-03-10 | End: 2025-03-10

## 2025-03-10 RX ORDER — HYDROCODONE BITARTRATE AND ACETAMINOPHEN 10; 325 MG/1; MG/1
1 TABLET ORAL EVERY 4 HOURS PRN
Refills: 0 | Status: CANCELLED | OUTPATIENT
Start: 2025-03-10 | End: 2025-03-17

## 2025-03-10 RX ORDER — DEXTROSE MONOHYDRATE 25 G/50ML
25 INJECTION, SOLUTION INTRAVENOUS
Status: CANCELLED | OUTPATIENT
Start: 2025-03-10

## 2025-03-10 RX ORDER — ACETAMINOPHEN 325 MG/1
650 TABLET ORAL EVERY 4 HOURS PRN
Status: CANCELLED | OUTPATIENT
Start: 2025-03-10

## 2025-03-10 RX ORDER — SODIUM CHLORIDE 0.9 % (FLUSH) 0.9 %
10 SYRINGE (ML) INJECTION EVERY 12 HOURS SCHEDULED
Status: CANCELLED | OUTPATIENT
Start: 2025-03-10

## 2025-03-10 RX ORDER — MISOPROSTOL 200 UG/1
800 TABLET ORAL AS NEEDED
Status: CANCELLED | OUTPATIENT
Start: 2025-03-10

## 2025-03-10 RX ORDER — SODIUM CHLORIDE 9 MG/ML
40 INJECTION, SOLUTION INTRAVENOUS AS NEEDED
Status: CANCELLED | OUTPATIENT
Start: 2025-03-10 | End: 2025-03-12

## 2025-03-10 RX ORDER — ACETAMINOPHEN 325 MG/1
650 TABLET ORAL ONCE AS NEEDED
Status: CANCELLED | OUTPATIENT
Start: 2025-03-10

## 2025-03-10 RX ORDER — METHYLERGONOVINE MALEATE 0.2 MG/ML
200 INJECTION INTRAVENOUS ONCE AS NEEDED
Status: CANCELLED | OUTPATIENT
Start: 2025-03-10

## 2025-03-10 RX ORDER — MAGNESIUM CARB/ALUMINUM HYDROX 105-160MG
30 TABLET,CHEWABLE ORAL ONCE
Status: CANCELLED | OUTPATIENT
Start: 2025-03-10 | End: 2025-03-10

## 2025-03-11 ENCOUNTER — ANESTHESIA (OUTPATIENT)
Dept: LABOR AND DELIVERY | Facility: HOSPITAL | Age: 35
End: 2025-03-11
Payer: MEDICAID

## 2025-03-11 ENCOUNTER — HOSPITAL ENCOUNTER (INPATIENT)
Facility: HOSPITAL | Age: 35
LOS: 2 days | Discharge: HOME | End: 2025-03-13
Attending: OBSTETRICS & GYNECOLOGY | Admitting: OBSTETRICS & GYNECOLOGY
Payer: MEDICAID

## 2025-03-11 ENCOUNTER — HOSPITAL ENCOUNTER (INPATIENT)
Dept: LABOR AND DELIVERY | Facility: HOSPITAL | Age: 35
Discharge: HOME | End: 2025-03-11
Payer: MEDICAID

## 2025-03-11 ENCOUNTER — ANESTHESIA EVENT (OUTPATIENT)
Dept: LABOR AND DELIVERY | Facility: HOSPITAL | Age: 35
End: 2025-03-11
Payer: MEDICAID

## 2025-03-11 PROBLEM — Z34.90 ENCOUNTER FOR INDUCTION OF LABOR: Status: ACTIVE | Noted: 2025-03-11

## 2025-03-11 PROBLEM — R73.09 ABNORMAL GLUCOSE TOLERANCE TEST: Status: RESOLVED | Noted: 2024-09-30 | Resolved: 2025-03-11

## 2025-03-11 LAB
ABO GROUP BLD: NORMAL
ALBUMIN SERPL-MCNC: 3.4 G/DL (ref 3.5–5.2)
ALBUMIN/GLOB SERPL: 1.1 G/DL
ALP SERPL-CCNC: 188 U/L (ref 39–117)
ALT SERPL W P-5'-P-CCNC: 10 U/L (ref 1–33)
AMPHET+METHAMPHET UR QL: NEGATIVE
AMPHETAMINES UR QL: NEGATIVE
ANION GAP SERPL CALCULATED.3IONS-SCNC: 13.9 MMOL/L (ref 5–15)
AST SERPL-CCNC: 18 U/L (ref 1–32)
ATMOSPHERIC PRESS: 735.2 MMHG
ATMOSPHERIC PRESS: 735.6 MMHG
BARBITURATES UR QL SCN: NEGATIVE
BASE EXCESS BLDCOA CALC-SCNC: -2.8 MMOL/L (ref -2–2)
BASE EXCESS BLDCOV CALC-SCNC: -2.6 MMOL/L (ref -30–30)
BENZODIAZ UR QL SCN: NEGATIVE
BILIRUB SERPL-MCNC: 0.3 MG/DL (ref 0–1.2)
BLD GP AB SCN SERPL QL: NEGATIVE
BUN SERPL-MCNC: 13 MG/DL (ref 6–20)
BUN/CREAT SERPL: 24.5 (ref 7–25)
BUPRENORPHINE SERPL-MCNC: NEGATIVE NG/ML
CALCIUM SPEC-SCNC: 8.9 MG/DL (ref 8.6–10.5)
CANNABINOIDS SERPL QL: NEGATIVE
CHLORIDE SERPL-SCNC: 103 MMOL/L (ref 98–107)
CO2 SERPL-SCNC: 18.1 MMOL/L (ref 22–29)
COCAINE UR QL: NEGATIVE
CREAT SERPL-MCNC: 0.53 MG/DL (ref 0.57–1)
DEPRECATED RDW RBC AUTO: 39.6 FL (ref 37–54)
EGFRCR SERPLBLD CKD-EPI 2021: 123.9 ML/MIN/1.73
ERYTHROCYTE [DISTWIDTH] IN BLOOD BY AUTOMATED COUNT: 12.8 % (ref 12.3–15.4)
FENTANYL UR-MCNC: NEGATIVE NG/ML
GLOBULIN UR ELPH-MCNC: 3.2 GM/DL
GLUCOSE BLDC GLUCOMTR-MCNC: 72 MG/DL (ref 70–99)
GLUCOSE BLDC GLUCOMTR-MCNC: 76 MG/DL (ref 70–99)
GLUCOSE SERPL-MCNC: 93 MG/DL (ref 65–99)
HCO3 BLDCOA-SCNC: 24.4 MMOL/L
HCO3 BLDCOV-SCNC: 21.7 MMOL/L
HCT VFR BLD AUTO: 30.5 % (ref 34–46.6)
HGB BLD-MCNC: 10.3 G/DL (ref 12–15.9)
MCH RBC QN AUTO: 28.8 PG (ref 26.6–33)
MCHC RBC AUTO-ENTMCNC: 33.8 G/DL (ref 31.5–35.7)
MCV RBC AUTO: 85.2 FL (ref 79–97)
METHADONE UR QL SCN: NEGATIVE
MODALITY: ABNORMAL
MODALITY: NORMAL
OPIATES UR QL: NEGATIVE
OXYCODONE UR QL SCN: NEGATIVE
PCO2 BLDCOA: 49.4 MMHG (ref 33–49)
PCO2 BLDCOV: 35.7 MM HG (ref 35–51.3)
PCP UR QL SCN: NEGATIVE
PH BLDCOA: 7.3 PH UNITS (ref 7.18–7.34)
PH BLDCOV: 7.39 PH UNITS (ref 7.26–7.4)
PLATELET # BLD AUTO: 233 10*3/MM3 (ref 140–450)
PMV BLD AUTO: 9.4 FL (ref 6–12)
PO2 BLDCOA: 18.5 MMHG
PO2 BLDCOV: 31.1 MM HG (ref 19–39)
POTASSIUM SERPL-SCNC: 3.9 MMOL/L (ref 3.5–5.2)
PROT SERPL-MCNC: 6.6 G/DL (ref 6–8.5)
RBC # BLD AUTO: 3.58 10*6/MM3 (ref 3.77–5.28)
RH BLD: POSITIVE
SAO2 % BLDCOA: 23.2 %
SAO2 % BLDCOV: 59.9 %
SODIUM SERPL-SCNC: 135 MMOL/L (ref 136–145)
T&S EXPIRATION DATE: NORMAL
TREPONEMA PALLIDUM IGG+IGM AB [PRESENCE] IN SERUM OR PLASMA BY IMMUNOASSAY: NORMAL
TRICYCLICS UR QL SCN: NEGATIVE
WBC NRBC COR # BLD AUTO: 7.74 10*3/MM3 (ref 3.4–10.8)

## 2025-03-11 PROCEDURE — 80053 COMPREHEN METABOLIC PANEL: CPT | Performed by: OBSTETRICS & GYNECOLOGY

## 2025-03-11 PROCEDURE — 51702 INSERT TEMP BLADDER CATH: CPT

## 2025-03-11 PROCEDURE — C1755 CATHETER, INTRASPINAL: HCPCS | Performed by: NURSE ANESTHETIST, CERTIFIED REGISTERED

## 2025-03-11 PROCEDURE — 25810000003 SODIUM CHLORIDE 0.9 % SOLUTION: Performed by: OBSTETRICS & GYNECOLOGY

## 2025-03-11 PROCEDURE — 86850 RBC ANTIBODY SCREEN: CPT | Performed by: OBSTETRICS & GYNECOLOGY

## 2025-03-11 PROCEDURE — 86780 TREPONEMA PALLIDUM: CPT | Performed by: OBSTETRICS & GYNECOLOGY

## 2025-03-11 PROCEDURE — 86901 BLOOD TYPING SEROLOGIC RH(D): CPT | Performed by: OBSTETRICS & GYNECOLOGY

## 2025-03-11 PROCEDURE — 86900 BLOOD TYPING SEROLOGIC ABO: CPT | Performed by: OBSTETRICS & GYNECOLOGY

## 2025-03-11 PROCEDURE — 82948 REAGENT STRIP/BLOOD GLUCOSE: CPT | Performed by: OBSTETRICS & GYNECOLOGY

## 2025-03-11 PROCEDURE — 25010000002 OXYTOCIN PER 10 UNITS: Performed by: OBSTETRICS & GYNECOLOGY

## 2025-03-11 PROCEDURE — 85027 COMPLETE CBC AUTOMATED: CPT | Performed by: OBSTETRICS & GYNECOLOGY

## 2025-03-11 PROCEDURE — 59025 FETAL NON-STRESS TEST: CPT

## 2025-03-11 PROCEDURE — 82803 BLOOD GASES ANY COMBINATION: CPT

## 2025-03-11 PROCEDURE — 25010000002 ROPIVACAINE PER 1 MG: Performed by: NURSE ANESTHETIST, CERTIFIED REGISTERED

## 2025-03-11 PROCEDURE — 25810000003 LACTATED RINGERS PER 1000 ML: Performed by: OBSTETRICS & GYNECOLOGY

## 2025-03-11 PROCEDURE — 59410 OBSTETRICAL CARE: CPT | Performed by: OBSTETRICS & GYNECOLOGY

## 2025-03-11 PROCEDURE — 80307 DRUG TEST PRSMV CHEM ANLYZR: CPT | Performed by: OBSTETRICS & GYNECOLOGY

## 2025-03-11 RX ORDER — OXYTOCIN/0.9 % SODIUM CHLORIDE 30/500 ML
999 PLASTIC BAG, INJECTION (ML) INTRAVENOUS ONCE
Status: DISCONTINUED | OUTPATIENT
Start: 2025-03-11 | End: 2025-03-13 | Stop reason: HOSPADM

## 2025-03-11 RX ORDER — OXYTOCIN/0.9 % SODIUM CHLORIDE 30/500 ML
1-16 PLASTIC BAG, INJECTION (ML) INTRAVENOUS
Status: DISCONTINUED | OUTPATIENT
Start: 2025-03-11 | End: 2025-03-11 | Stop reason: HOSPADM

## 2025-03-11 RX ORDER — ONDANSETRON 4 MG/1
4 TABLET, ORALLY DISINTEGRATING ORAL EVERY 8 HOURS PRN
Status: DISCONTINUED | OUTPATIENT
Start: 2025-03-11 | End: 2025-03-13 | Stop reason: HOSPADM

## 2025-03-11 RX ORDER — SODIUM CHLORIDE, SODIUM LACTATE, POTASSIUM CHLORIDE, CALCIUM CHLORIDE 600; 310; 30; 20 MG/100ML; MG/100ML; MG/100ML; MG/100ML
125 INJECTION, SOLUTION INTRAVENOUS CONTINUOUS
Status: DISCONTINUED | OUTPATIENT
Start: 2025-03-11 | End: 2025-03-11

## 2025-03-11 RX ORDER — LIDOCAINE HYDROCHLORIDE AND EPINEPHRINE 15; 5 MG/ML; UG/ML
INJECTION, SOLUTION EPIDURAL
Status: COMPLETED | OUTPATIENT
Start: 2025-03-11 | End: 2025-03-11

## 2025-03-11 RX ORDER — ONDANSETRON 2 MG/ML
4 INJECTION INTRAMUSCULAR; INTRAVENOUS EVERY 6 HOURS PRN
Status: DISCONTINUED | OUTPATIENT
Start: 2025-03-11 | End: 2025-03-11 | Stop reason: HOSPADM

## 2025-03-11 RX ORDER — POLYETHYLENE GLYCOL 3350 17 G/17G
17 POWDER, FOR SOLUTION ORAL DAILY PRN
Status: DISCONTINUED | OUTPATIENT
Start: 2025-03-11 | End: 2025-03-13 | Stop reason: HOSPADM

## 2025-03-11 RX ORDER — ROPIVACAINE HYDROCHLORIDE 2 MG/ML
INJECTION, SOLUTION EPIDURAL; INFILTRATION; PERINEURAL
Status: COMPLETED | OUTPATIENT
Start: 2025-03-11 | End: 2025-03-11

## 2025-03-11 RX ORDER — CALCIUM CARBONATE 500 MG/1
2 TABLET, CHEWABLE ORAL 3 TIMES DAILY PRN
Status: DISCONTINUED | OUTPATIENT
Start: 2025-03-11 | End: 2025-03-13 | Stop reason: HOSPADM

## 2025-03-11 RX ORDER — FAMOTIDINE 10 MG/ML
20 INJECTION, SOLUTION INTRAVENOUS 2 TIMES DAILY PRN
Status: DISCONTINUED | OUTPATIENT
Start: 2025-03-11 | End: 2025-03-11 | Stop reason: HOSPADM

## 2025-03-11 RX ORDER — ONDANSETRON 2 MG/ML
4 INJECTION INTRAMUSCULAR; INTRAVENOUS EVERY 6 HOURS PRN
Status: DISCONTINUED | OUTPATIENT
Start: 2025-03-11 | End: 2025-03-13 | Stop reason: HOSPADM

## 2025-03-11 RX ORDER — SODIUM CHLORIDE 0.9 % (FLUSH) 0.9 %
1-10 SYRINGE (ML) INJECTION AS NEEDED
Status: DISCONTINUED | OUTPATIENT
Start: 2025-03-11 | End: 2025-03-13 | Stop reason: HOSPADM

## 2025-03-11 RX ORDER — FAMOTIDINE 20 MG/1
20 TABLET, FILM COATED ORAL ONCE AS NEEDED
Status: DISCONTINUED | OUTPATIENT
Start: 2025-03-11 | End: 2025-03-13 | Stop reason: HOSPADM

## 2025-03-11 RX ORDER — ONDANSETRON 4 MG/1
4 TABLET, ORALLY DISINTEGRATING ORAL EVERY 6 HOURS PRN
Status: DISCONTINUED | OUTPATIENT
Start: 2025-03-11 | End: 2025-03-11 | Stop reason: HOSPADM

## 2025-03-11 RX ORDER — TRAMADOL HYDROCHLORIDE 50 MG/1
50 TABLET ORAL EVERY 6 HOURS PRN
Status: DISCONTINUED | OUTPATIENT
Start: 2025-03-11 | End: 2025-03-13 | Stop reason: HOSPADM

## 2025-03-11 RX ORDER — ACETAMINOPHEN 325 MG/1
650 TABLET ORAL EVERY 6 HOURS
Status: DISCONTINUED | OUTPATIENT
Start: 2025-03-11 | End: 2025-03-13 | Stop reason: HOSPADM

## 2025-03-11 RX ORDER — NICOTINE POLACRILEX 4 MG
15 LOZENGE BUCCAL
Status: DISCONTINUED | OUTPATIENT
Start: 2025-03-11 | End: 2025-03-11 | Stop reason: HOSPADM

## 2025-03-11 RX ORDER — ACETAMINOPHEN 325 MG/1
650 TABLET ORAL ONCE AS NEEDED
Status: DISCONTINUED | OUTPATIENT
Start: 2025-03-11 | End: 2025-03-13 | Stop reason: HOSPADM

## 2025-03-11 RX ORDER — DOCUSATE SODIUM 100 MG/1
100 CAPSULE, LIQUID FILLED ORAL DAILY
Status: DISCONTINUED | OUTPATIENT
Start: 2025-03-12 | End: 2025-03-13 | Stop reason: HOSPADM

## 2025-03-11 RX ORDER — IBUPROFEN 800 MG/1
800 TABLET, FILM COATED ORAL ONCE AS NEEDED
Status: DISCONTINUED | OUTPATIENT
Start: 2025-03-11 | End: 2025-03-13 | Stop reason: HOSPADM

## 2025-03-11 RX ORDER — SODIUM CHLORIDE 0.9 % (FLUSH) 0.9 %
10 SYRINGE (ML) INJECTION EVERY 12 HOURS SCHEDULED
Status: DISCONTINUED | OUTPATIENT
Start: 2025-03-11 | End: 2025-03-11 | Stop reason: HOSPADM

## 2025-03-11 RX ORDER — MISOPROSTOL 200 UG/1
800 TABLET ORAL AS NEEDED
Status: DISCONTINUED | OUTPATIENT
Start: 2025-03-11 | End: 2025-03-11 | Stop reason: HOSPADM

## 2025-03-11 RX ORDER — MAGNESIUM CARB/ALUMINUM HYDROX 105-160MG
30 TABLET,CHEWABLE ORAL ONCE
Status: DISCONTINUED | OUTPATIENT
Start: 2025-03-11 | End: 2025-03-11 | Stop reason: HOSPADM

## 2025-03-11 RX ORDER — OXYTOCIN/0.9 % SODIUM CHLORIDE 30/500 ML
250 PLASTIC BAG, INJECTION (ML) INTRAVENOUS CONTINUOUS
Status: ACTIVE | OUTPATIENT
Start: 2025-03-11 | End: 2025-03-11

## 2025-03-11 RX ORDER — HYDROCODONE BITARTRATE AND ACETAMINOPHEN 5; 325 MG/1; MG/1
1 TABLET ORAL EVERY 4 HOURS PRN
Refills: 0 | Status: DISCONTINUED | OUTPATIENT
Start: 2025-03-11 | End: 2025-03-13 | Stop reason: HOSPADM

## 2025-03-11 RX ORDER — METOCLOPRAMIDE HYDROCHLORIDE 5 MG/ML
10 INJECTION INTRAMUSCULAR; INTRAVENOUS EVERY 6 HOURS PRN
Status: DISCONTINUED | OUTPATIENT
Start: 2025-03-11 | End: 2025-03-11 | Stop reason: HOSPADM

## 2025-03-11 RX ORDER — DEXTROSE MONOHYDRATE 25 G/50ML
25 INJECTION, SOLUTION INTRAVENOUS
Status: DISCONTINUED | OUTPATIENT
Start: 2025-03-11 | End: 2025-03-11 | Stop reason: HOSPADM

## 2025-03-11 RX ORDER — FAMOTIDINE 10 MG/ML
20 INJECTION, SOLUTION INTRAVENOUS ONCE AS NEEDED
Status: DISCONTINUED | OUTPATIENT
Start: 2025-03-11 | End: 2025-03-13 | Stop reason: HOSPADM

## 2025-03-11 RX ORDER — PROMETHAZINE HYDROCHLORIDE 25 MG/1
12.5 TABLET ORAL EVERY 6 HOURS PRN
Status: DISCONTINUED | OUTPATIENT
Start: 2025-03-11 | End: 2025-03-11 | Stop reason: HOSPADM

## 2025-03-11 RX ORDER — PROMETHAZINE HYDROCHLORIDE 25 MG/1
25 TABLET ORAL EVERY 6 HOURS PRN
Status: DISCONTINUED | OUTPATIENT
Start: 2025-03-11 | End: 2025-03-13 | Stop reason: HOSPADM

## 2025-03-11 RX ORDER — HYDROCODONE BITARTRATE AND ACETAMINOPHEN 10; 325 MG/1; MG/1
1 TABLET ORAL EVERY 4 HOURS PRN
Refills: 0 | Status: DISCONTINUED | OUTPATIENT
Start: 2025-03-11 | End: 2025-03-13 | Stop reason: HOSPADM

## 2025-03-11 RX ORDER — ROPIVACAINE HYDROCHLORIDE 2 MG/ML
INJECTION, SOLUTION EPIDURAL; INFILTRATION; PERINEURAL
Status: COMPLETED
Start: 2025-03-11 | End: 2025-03-11

## 2025-03-11 RX ORDER — ONDANSETRON 4 MG/1
4 TABLET, ORALLY DISINTEGRATING ORAL EVERY 6 HOURS PRN
Status: DISCONTINUED | OUTPATIENT
Start: 2025-03-11 | End: 2025-03-13 | Stop reason: HOSPADM

## 2025-03-11 RX ORDER — PROMETHAZINE HYDROCHLORIDE 25 MG/1
12.5 TABLET ORAL EVERY 4 HOURS PRN
Status: DISCONTINUED | OUTPATIENT
Start: 2025-03-11 | End: 2025-03-13 | Stop reason: HOSPADM

## 2025-03-11 RX ORDER — SODIUM CHLORIDE 9 MG/ML
40 INJECTION, SOLUTION INTRAVENOUS AS NEEDED
Status: DISCONTINUED | OUTPATIENT
Start: 2025-03-11 | End: 2025-03-11 | Stop reason: HOSPADM

## 2025-03-11 RX ORDER — TRANEXAMIC ACID 10 MG/ML
INJECTION, SOLUTION INTRAVENOUS
Status: DISCONTINUED
Start: 2025-03-11 | End: 2025-03-11 | Stop reason: WASHOUT

## 2025-03-11 RX ORDER — ACETAMINOPHEN 325 MG/1
650 TABLET ORAL EVERY 4 HOURS PRN
Status: DISCONTINUED | OUTPATIENT
Start: 2025-03-11 | End: 2025-03-11 | Stop reason: HOSPADM

## 2025-03-11 RX ORDER — FAMOTIDINE 20 MG/1
20 TABLET, FILM COATED ORAL 2 TIMES DAILY PRN
Status: DISCONTINUED | OUTPATIENT
Start: 2025-03-11 | End: 2025-03-11 | Stop reason: HOSPADM

## 2025-03-11 RX ORDER — IBUPROFEN 600 MG/1
1 TABLET ORAL
Status: DISCONTINUED | OUTPATIENT
Start: 2025-03-11 | End: 2025-03-11 | Stop reason: HOSPADM

## 2025-03-11 RX ORDER — LIDOCAINE HYDROCHLORIDE 10 MG/ML
0.5 INJECTION, SOLUTION EPIDURAL; INFILTRATION; INTRACAUDAL; PERINEURAL ONCE AS NEEDED
Status: DISCONTINUED | OUTPATIENT
Start: 2025-03-11 | End: 2025-03-11 | Stop reason: HOSPADM

## 2025-03-11 RX ORDER — TERBUTALINE SULFATE 1 MG/ML
0.25 INJECTION SUBCUTANEOUS AS NEEDED
Status: DISCONTINUED | OUTPATIENT
Start: 2025-03-11 | End: 2025-03-11 | Stop reason: HOSPADM

## 2025-03-11 RX ORDER — EPHEDRINE SULFATE 50 MG/ML
5 INJECTION, SOLUTION INTRAVENOUS
Status: DISCONTINUED | OUTPATIENT
Start: 2025-03-11 | End: 2025-03-11 | Stop reason: HOSPADM

## 2025-03-11 RX ORDER — METHYLERGONOVINE MALEATE 0.2 MG/ML
200 INJECTION INTRAVENOUS ONCE AS NEEDED
Status: DISCONTINUED | OUTPATIENT
Start: 2025-03-11 | End: 2025-03-11 | Stop reason: HOSPADM

## 2025-03-11 RX ORDER — SODIUM CHLORIDE 0.9 % (FLUSH) 0.9 %
10 SYRINGE (ML) INJECTION AS NEEDED
Status: DISCONTINUED | OUTPATIENT
Start: 2025-03-11 | End: 2025-03-11 | Stop reason: HOSPADM

## 2025-03-11 RX ORDER — OXYTOCIN/0.9 % SODIUM CHLORIDE 30/500 ML
125 PLASTIC BAG, INJECTION (ML) INTRAVENOUS CONTINUOUS PRN
Status: DISCONTINUED | OUTPATIENT
Start: 2025-03-11 | End: 2025-03-13 | Stop reason: HOSPADM

## 2025-03-11 RX ORDER — MORPHINE SULFATE 10 MG/ML
5 INJECTION INTRAMUSCULAR; INTRAVENOUS; SUBCUTANEOUS
Status: DISCONTINUED | OUTPATIENT
Start: 2025-03-11 | End: 2025-03-11 | Stop reason: HOSPADM

## 2025-03-11 RX ORDER — IBUPROFEN 600 MG/1
600 TABLET, FILM COATED ORAL EVERY 6 HOURS SCHEDULED
Status: DISCONTINUED | OUTPATIENT
Start: 2025-03-12 | End: 2025-03-13 | Stop reason: HOSPADM

## 2025-03-11 RX ADMIN — SODIUM CHLORIDE, SODIUM LACTATE, POTASSIUM CHLORIDE, CALCIUM CHLORIDE 125 ML/HR: 20; 30; 600; 310 INJECTION, SOLUTION INTRAVENOUS at 07:30

## 2025-03-11 RX ADMIN — ACETAMINOPHEN 650 MG: 325 TABLET ORAL at 20:15

## 2025-03-11 RX ADMIN — ROPIVACAINE HYDROCHLORIDE 5 ML: 2 INJECTION, SOLUTION EPIDURAL; INFILTRATION; PERINEURAL at 10:09

## 2025-03-11 RX ADMIN — SODIUM CHLORIDE, SODIUM LACTATE, POTASSIUM CHLORIDE, CALCIUM CHLORIDE 125 ML/HR: 20; 30; 600; 310 INJECTION, SOLUTION INTRAVENOUS at 10:34

## 2025-03-11 RX ADMIN — SODIUM CHLORIDE 1 MILLI-UNITS/MIN: 9 INJECTION, SOLUTION INTRAVENOUS at 08:17

## 2025-03-11 RX ADMIN — MISOPROSTOL 400 MCG: 200 TABLET ORAL at 17:53

## 2025-03-11 RX ADMIN — LIDOCAINE HYDROCHLORIDE AND EPINEPHRINE 2 ML: 15; 5 INJECTION, SOLUTION EPIDURAL at 10:06

## 2025-03-11 NOTE — ANESTHESIA PREPROCEDURE EVALUATION
Anesthesia Evaluation     Patient summary reviewed and Nursing notes reviewed   no history of anesthetic complications:   NPO Solid Status: > 8 hours  NPO Liquid Status: > 2 hours           Airway   Mallampati: II  TM distance: >3 FB  Neck ROM: full  No difficulty expected  Dental - normal exam     Pulmonary - negative pulmonary ROS and normal exam    breath sounds clear to auscultation  Cardiovascular - negative cardio ROS and normal exam  Exercise tolerance: good (4-7 METS)    Rhythm: regular  Rate: normal        Neuro/Psych- negative ROS  GI/Hepatic/Renal/Endo    (+) GERD well controlled    Musculoskeletal (-) negative ROS    Abdominal   (+) obese   Substance History - negative use     OB/GYN    (+) Pregnant        Other - negative ROS       ROS/Med Hx Other:                Anesthesia Plan    ASA 2     epidural       Anesthetic plan, risks, benefits, and alternatives have been provided, discussed and informed consent has been obtained with: patient.    Plan discussed with CRNA.    CODE STATUS:    Code Status (Patient has no pulse and is not breathing): CPR (Attempt to Resuscitate)  Medical Interventions (Patient has pulse or is breathing): Full

## 2025-03-11 NOTE — DISCHARGE INSTRUCTIONS
DR. WOODS'S POSTPARTUM DISCHARGE PRECAUTIONS and Answers to FAQs     NO SEX for SIX weeks.     NO TUB BATH or POOL for TWO week(s), shower only.  Sitz baths are fine.     STITCHES (if present):  wash them daily in the shower with soap and water (any type of soap is fine, it does not need to be antibacterial soap).  It is ok to gently put your finger in and around the vaginal area.  Look at your stitches (the ones on the outside) when you get home.  You will then know what is normal and can have a point of reference to compare it to if you start to have concerns.  REDNESS, PUS, increase in PAIN, FEVER or CHILLS are all reasons to be seen our office immediately.  Go to the ER, if it is after hours or a weekend.       VAGINAL BLEEDING:  may continue on and off over the next several weeks after delivery and may increase slightly once you go home.  You should not be bleeding more than 1 large pad soaked every hour or two.  Clots (even the size of a lemon or larger) may be normal as long as the bleeding is not heavy and the clots do not continue.       FEVER or CHILLS or NOT FEELING WELL: call our office.  If the office is closed, you need to be seen in acute care or ER.       CHEST PAIN or SHORTNESS OF BREATH/AIR: you need to GO TO THE NEAREST ER or CALL 911.      SWELLING: can increase over the next 7-10 days and then should slowly improve.  Your legs/ankles should be fairly similar in size.  A red, painful, hot, swollen leg (usually just one side) can be a sign of a blood clot and should be evaluated immediately.  Call our office.  If it is after hours or a weekend, you must be seen IMMEDIATELY IN THE ER.      ELEVATED BLOOD PRESSURE:  you need to contact us if you are having  persistent elevated BP systolic (top number) more than 155 or diastolic (bottom number) more than 95, or a headache (not relieved with rest, hydration or over the counter pain reliever), an increase in your swelling (usually hands and face),  changes in your vision (typically flashing white or black spots) or severe persistent pain in the location of the upper right side of your belly (under your right breast).  Call our office or go to ER if after hours or a weekend.     LACTATION QUESTIONS or CONCERNS?  Call [facility] Lactation Support [phone].     WORK and SCHOOL TIME OFF: depends on your specific delivery type, surrounding circumstances, and your work insurance/school rules.  If you have questions, please call Gabbi Mattson at [phone] (ext. [phone]).  Or email Gabbi at [email or web address].  They will assist in required paperwork for you and/or family members.      Any further QUESTIONS or CONCERNS, please call [facility] THERESA Law at [phone].

## 2025-03-11 NOTE — L&D DELIVERY NOTE
VAGINAL DELIVERY NOTE          Date: 3/11/2025  Time:  5:46 PM  GA: 39w1d    Infant: female infant   3940 g (8 lb 11 oz)    APGARS: 8  @ 1 minute / 9  @ 5 minutes    Delivery: The patient progressed to complete, complete and pushed for 1 contraction to deliver a viable infant in cephalic presentation weighing the above weight and above Apgars.  There was no nuchal cord.  The mouth and nares were bulb suctioned on the perineum.  The right anterior and left posterior shoulders were easily delivered without traction.  The remainder of the body delivered.  The infant was vigorous.  Delayed cord clamping for 60 seconds.  The cord was then clamped and cut.  Infant was placed on the maternal chest for recovery.  The placenta delivered intact with the assistance of fundal massage and maternal pushing efforts.      On full evaluation of the perineum, vagina, cervix and rectum no repair was needed.  Intact perineum.  External anal sphincter was intact.  200mcg of Cytotec was given orally and 200mcg given SL to assist with uterine tone.    Needle, gauze and lap count correct.      Anesthesia: Epidural    Perineum: Intact    Estimated Blood Loss: 200 mls    Complications: None         Electronically signed by Marisa Delacruz DO, 03/11/25, 6:12 PM EDT.       [facility] LABOR AND DELIVERY  [address] [place] [address] [place]  [place] [address]  Dept: [phone]  Dept Fax: [phone]  Loc: [phone]

## 2025-03-11 NOTE — DISCHARGE SUMMARY
OB Discharge Summary        Admit Date:  3/11/2025  Date of Delivery: 3/11/2025  Discharge Date: 25    Reason for Admission/Chief Complaint: Scheduled IOL    Final Diagnosis:  39+1  Pregestational diabetes-well-controlled with metformin added in pregnancy  AMA  Polyhydramnios  Anemia of pregnancy  Short interval pregnancy  To do at FU: Routine postpartum, preop sterilization  Pending Results       None            Antepartum:  Prenatal care is complicated by:  See above Final Diagnosis for list    Intrapartum/Delivery:  OB Surgeon:  Dr. Marisa Delacruz,   Anesthesia: Epidural  Delivery Type:   Perineum : Intact  Feeding method: Breast    Infant: female infant; [place]    Weight: 3940 g (8 lb 11 oz)     APGARS: 8  @ 1 minute / 9  @ 5 minutes    Hospital Course/Significant Findings:  Patsy arrived for scheduled IOL, cervix 2 cm 30% effaced.  Low-dose Pitocin and 80/80 cervical catheter.  Uncomplicated labor.  Blood sugars within normal limits intrapartum.  Uncomplicated   On the day of discharge POSTPARTUM pt tolerating a regular diet, ambulating, pain well controlled, urinating spontaneously and lochia appropriate.   Vital signs were stable and afebrile.  Exam was within normal limits.  Fundus was below umbilicus and nontender.  Meeting discharge criteria and desired discharge home.  Postpartum instructions and FU reviewed and questions answered.    Results from last 7 days   Lab Units 25  0730   WBC 10*3/mm3 7.74   HEMOGLOBIN g/dL 10.3*   HEMATOCRIT % 30.5*   PLATELETS 10*3/mm3 233       Results from last 7 days   Lab Units 25  0730   GLUCOSE mg/dL 93   CREATININE mg/dL 0.53*   SODIUM mmol/L 135*   POTASSIUM mmol/L 3.9   CHLORIDE mmol/L 103   AST (SGOT) U/L 18   ALT (SGPT) U/L 10       Results from last 7 days   Lab Units 25  0730   TREPONEMA PALLIDUM AB TOTAL  Non-Reactive       Results for orders placed or performed during the hospital encounter of 25   CBC (No Diff)    Collection  Time: 03/11/25  7:30 AM    Specimen: Blood   Result Value Ref Range    WBC 7.74 3.40 - 10.80 10*3/mm3    RBC 3.58 (L) 3.77 - 5.28 10*6/mm3    Hemoglobin 10.3 (L) 12.0 - 15.9 g/dL    Hematocrit 30.5 (L) 34.0 - 46.6 %    MCV 85.2 79.0 - 97.0 fL    MCH 28.8 26.6 - 33.0 pg    MCHC 33.8 31.5 - 35.7 g/dL    RDW 12.8 12.3 - 15.4 %    RDW-SD 39.6 37.0 - 54.0 fl    MPV 9.4 6.0 - 12.0 fL    Platelets 233 140 - 450 10*3/mm3   Treponema pallidum AB w/Reflex RPR    Collection Time: 03/11/25  7:30 AM    Specimen: Blood   Result Value Ref Range    Treponemal AB Total Non-Reactive Non-Reactive   Comprehensive Metabolic Panel    Collection Time: 03/11/25  7:30 AM    Specimen: Blood   Result Value Ref Range    Glucose 93 65 - 99 mg/dL    BUN 13 6 - 20 mg/dL    Creatinine 0.53 (L) 0.57 - 1.00 mg/dL    Sodium 135 (L) 136 - 145 mmol/L    Potassium 3.9 3.5 - 5.2 mmol/L    Chloride 103 98 - 107 mmol/L    CO2 18.1 (L) 22.0 - 29.0 mmol/L    Calcium 8.9 8.6 - 10.5 mg/dL    Total Protein 6.6 6.0 - 8.5 g/dL    Albumin 3.4 (L) 3.5 - 5.2 g/dL    ALT (SGPT) 10 1 - 33 U/L    AST (SGOT) 18 1 - 32 U/L    Alkaline Phosphatase 188 (H) 39 - 117 U/L    Total Bilirubin 0.3 0.0 - 1.2 mg/dL    Globulin 3.2 gm/dL    A/G Ratio 1.1 g/dL    BUN/Creatinine Ratio 24.5 7.0 - 25.0    Anion Gap 13.9 5.0 - 15.0 mmol/L    eGFR 123.9 >60.0 mL/min/1.73   Type & Screen    Collection Time: 03/11/25  7:30 AM    Specimen: Blood   Result Value Ref Range    ABO Type O     RH type Positive     Antibody Screen Negative     T&S Expiration Date 3/14/2025 11:59:59 PM    Urine Drug Screen - Urine, Clean Catch    Collection Time: 03/11/25  7:31 AM    Specimen: Urine, Clean Catch   Result Value Ref Range    THC, Screen, Urine Negative Negative    Phencyclidine (PCP), Urine Negative Negative    Cocaine Screen, Urine Negative Negative    Methamphetamine, Ur Negative Negative    Opiate Screen Negative Negative    Amphetamine Screen, Urine Negative Negative    Benzodiazepine Screen,  Urine Negative Negative    Tricyclic Antidepressants Screen Negative Negative    Methadone Screen, Urine Negative Negative    Barbiturates Screen, Urine Negative Negative    Oxycodone Screen, Urine Negative Negative    Buprenorphine, Screen, Urine Negative Negative   Fentanyl, Urine - Urine, Clean Catch    Collection Time: 03/11/25  7:31 AM    Specimen: Urine, Clean Catch   Result Value Ref Range    Fentanyl, Urine Negative Negative   POC Glucose Q4H    Collection Time: 03/11/25 12:08 PM    Specimen: Blood   Result Value Ref Range    Glucose 76 70 - 99 mg/dL   POC Glucose Q4H    Collection Time: 03/11/25  4:29 PM    Specimen: Blood   Result Value Ref Range    Glucose 72 70 - 99 mg/dL   Blood Gas, Arterial, Cord    Collection Time: 03/11/25  6:08 PM    Specimen: Umbilical Cord; Cord Blood Arterial   Result Value Ref Range    pH, Cord Arterial 7.30 7.18 - 7.34 pH Units    pCO2, Cord Arterial 49.4 (H) 33.0 - 49.0 mmHg    pO2, Cord Arterial 18.5 mmHg    HCO3, Cord Arterial 24.4 mmol/L    Base Exc, Cord Arterial -2.8 (L) -2.0 - 2.0 mmol/L    O2 Sat, Cord Arterial 23.2 %    Barometric Pressure for Blood Gas 735.6000 mmHg    Modality Room Air    Blood Gas, Venous, Cord    Collection Time: 03/11/25  6:08 PM    Specimen: Umbilical Cord; Cord Blood Venous   Result Value Ref Range    pH, Cord Venous 7.391 7.260 - 7.400 pH Units    pCO2, Cord Venous 35.7 35.0 - 51.3 mm Hg    pO2, Cord Venous 31.1 19.0 - 39.0 mm Hg    HCO3, Cord Venous 21.7 mmol/L    Base Excess, Cord Venous -2.6 -30.0 - 30.0 mmol/L    O2 Sat, Cord Venous 59.9 %    Barometric Pressure for Blood Gas 735.2000 mmHg    Modality Room Air    POC Glucose Once    Collection Time: 03/12/25  8:27 AM    Specimen: Blood   Result Value Ref Range    Glucose 73 70 - 99 mg/dL   POC Glucose 4x Daily Fasting & PC    Collection Time: 03/13/25  6:08 AM    Specimen: Blood   Result Value Ref Range    Glucose 72 70 - 99 mg/dL       Lab Results   Component Value Date    CREATININEUR  49.4 01/14/2025        Blood Sugar in Office          3/11/2025    12:08 3/11/2025    16:29 3/12/2025    08:27 3/13/2025    06:08   Blood Sugar in Office   Glucose 76  72  73  72           Discharge:         Discharge Medications        New Medications        Instructions Start Date   acetaminophen 325 MG tablet  Commonly known as: Tylenol   650 mg, Oral, Every 6 Hours PRN      acetaminophen 325 MG tablet  Commonly known as: Tylenol   650 mg, Oral, Every 6 Hours PRN      ibuprofen 600 MG tablet  Commonly known as: ADVIL,MOTRIN   600 mg, Oral, Every 6 Hours PRN      ibuprofen 600 MG tablet  Commonly known as: ADVIL,MOTRIN   600 mg, Oral, Every 6 Hours PRN             Continue These Medications        Instructions Start Date   Alcohol Swabs pads   Use to clean fingers before checking BS 4 times per day and PRN      DHA Omega 3 100 MG capsule   Take  by mouth.      ferrous sulfate 325 (65 FE) MG tablet   325 mg, Oral, Every Other Day      FreeStyle Lite w/Device kit   CHECK BLOOD SUGAR FOUR TIMES DAILY FASTING AND 2 HOURS AFTER MEALS AND RECORD. BRING RECORD TO OFFICE VISITS      glucose blood test strip   1 each, Other, 4 Times Daily, Check BS 4x/day as instructed.      glucose monitor monitoring kit   Check BS FOUR times per day (fasting and 2hrs after meals) and record.  Bring blood sugar record to next office visit.      Lancets misc   120 each, Not Applicable, 4 Times Daily, Check blood sugars four times daily as directed      prenatal vitamin 28-0.8 28-0.8 MG tablet tablet   1 tablet, Oral, Daily             Stop These Medications      metFORMIN 500 MG tablet  Commonly known as: GLUCOPHAGE     pantoprazole 20 MG EC tablet  Commonly known as: Protonix                Disposition: Home  Diet: Regular    Activity: Pelvic rest 6 weeks    Condition at discharge: Good    Follow up with: Dr. Marisa Delacruz, DO or provider of her choice    Follow up in: Discuss birth control/sterilization 2-3 weeks      Complications: None        Signature: Electronically signed by Marisa Delacruz DO, 03/13/25, 8:09 AM EDT.        [facility] LABOR AND DELIVERY  [address] [place] [address] [place]  [facility] [address]  Dept: [phone]  Dept Fax: [phone]  Loc: [phone]

## 2025-03-11 NOTE — PROGRESS NOTES
OB Intrapartum Note    Subjective: No complaints, Pain controlled, Comfortable with epidural    Objective:  Baseline: Normal 110-160 bpm  Variability:   Moderate/Normal (amplitude 6-25 bpm)  Accelerations: Present (32 weeks+) 15 x 15 bpm  Decelerations: None  Contractions:  Regular, q3min, Pitocin at 6milliunits/min    Cervical exam:    Dilation: 9cm    Effacement: 100%    Station: +1    Impression:    Category I  Reassuring fetus, Adequate progress, Pain controlled    Plan:   Continue pitocin  Continue to monitor  Active labor positioning  Pelvis feels clinically adequate  Reviewed course/progress/plan with pt.  All questions answered to pts satisfaction.  Pt desires to proceed as outlined.        Electronically signed by Marisa Delacruz DO, 03/11/25, 4:53 PM EDT.

## 2025-03-11 NOTE — ANESTHESIA PROCEDURE NOTES
Labor Epidural      Patient reassessed immediately prior to procedure    Patient location during procedure: OB  Start Time: 3/11/2025 9:56 AM  Stop Time: 3/11/2025 10:13 AM  Indication:at surgeon's request  Performed By  CRNA/ABIGAIL: Bernard Moore CRNA  Preanesthetic Checklist  Completed: patient identified, IV checked, site marked, risks and benefits discussed, surgical consent, monitors and equipment checked, pre-op evaluation and timeout performed  Additional Notes  Pt spine with SUMMER  Prep:  Pt Position:sitting  Sterile Tech:cap, gloves, mask and sterile barrier  Prep:povidone-iodine 7.5% surgical scrub  Monitoring:blood pressure monitoring and continuous pulse oximetry  Epidural Block Procedure:  Approach:midline  Guidance:landmark technique and palpation technique  Needle Type:Tuohy  Needle Gauge:17 G  Loss of Resistance Medium: air  Loss of Resistance: 6cm  Cath Depth at skin:10 cm  Paresthesia: none  Aspiration:negative  Test Dose:negative  Medication: lidocaine 1.5%-EPINEPHrine 1:200,000 (XYLOCAINE W/EPI) injection - Epidural   2 mL - 3/11/2025 10:06:00 AM  ropivacaine (NAROPIN) 0.2 % injection - Injection   5 mL - 3/11/2025 10:09:00 AM  Number of Attempts: 1  Post Assessment:  Dressing:biopatch applied, occlusive dressing applied and secured with tape  Pt Tolerance:patient tolerated the procedure well with no apparent complications  Complications:no

## 2025-03-11 NOTE — PLAN OF CARE
Goal Outcome Evaluation:   Patient had an uncomplicated  at 1746 with no lacerations. Dr. Delacruz identified a cervical prolapse. Vital signs stable. Cytotec 400mcg administered to patient per Dr. Delacruz. Will recover for two hours in labor and delivery and then move to postpartum.       Problem: Adult Inpatient Plan of Care  Goal: Plan of Care Review  Outcome: Progressing  Goal: Patient-Specific Goal (Individualized)  Outcome: Progressing  Goal: Absence of Hospital-Acquired Illness or Injury  Outcome: Progressing  Intervention: Prevent and Manage VTE (Venous Thromboembolism) Risk  Recent Flowsheet Documentation  Taken 3/11/2025 1740 by Bhumi Trujillo RN  VTE Prevention/Management:   bilateral   SCDs (sequential compression devices) off  Taken 3/11/2025 1034 by Bhumi Trujillo RN  VTE Prevention/Management:   bilateral   SCDs (sequential compression devices) on  Goal: Optimal Comfort and Wellbeing  Outcome: Progressing  Intervention: Provide Person-Centered Care  Recent Flowsheet Documentation  Taken 3/11/2025 1800 by Bhumi Trujillo RN  Trust Relationship/Rapport:   care explained   choices provided   emotional support provided   empathic listening provided   questions answered   questions encouraged   reassurance provided   thoughts/feelings acknowledged  Taken 3/11/2025 0730 by Bhumi Trujillo RN  Trust Relationship/Rapport:   care explained   choices provided   emotional support provided   empathic listening provided   questions answered   questions encouraged   reassurance provided   thoughts/feelings acknowledged  Goal: Readiness for Transition of Care  Outcome: Progressing  Intervention: Mutually Develop Transition Plan  Recent Flowsheet Documentation  Taken 3/11/2025 0732 by Bhumi Trujillo RN  Equipment Currently Used at Home: none  Taken 3/11/2025 0731 by Bhumi Trujillo RN  Transportation Anticipated:   car, drives self   family or friend will provide  Patient/Family Anticipated Services at  Transition: none  Patient/Family Anticipates Transition to: home     Problem: Labor  Goal: Hemostasis  Outcome: Progressing  Goal: Stable Fetal Wellbeing  Outcome: Progressing  Goal: Effective Progression to Delivery  Outcome: Progressing  Goal: Absence of Infection Signs and Symptoms  Outcome: Progressing  Goal: Acceptable Pain Control  Outcome: Progressing  Goal: Normal Uterine Contraction Pattern  Outcome: Progressing

## 2025-03-11 NOTE — NURSING NOTE
33 YO A1 ambulatory to the unit for admission to Dr. Delacruz's services for IOL. DX: AMA, Pre-existing Type II DM (presumed d/t early glucola abnormal), Polyhydraminos, Macrosomia, and Anemia. [facility] applied and following shift will assess and admit patient. Call light in reach and bed in low position.

## 2025-03-11 NOTE — PROGRESS NOTES
OB Intrapartum Note    Subjective: No complaints    Objective:  Baseline: Normal 110-160 bpm  Variability:   Moderate/Normal (amplitude 6-25 bpm)  Accelerations: Present (32 weeks+) 15 x 15 bpm  Decelerations: None  Contractions:  Irregular    Cervical exam:    Dilation: 2cm    Effacement: 30%    Station: -3    Impression:    Category I  Reassuring fetus, Cervical cath placed 80/80    Plan:   Start pitocin  Continue to monitor  Pelvis feels clinically adequate  I have discussed in detail with patient the current plan to include, but NLT, appropriate expectations for labor and delivery, to include possible length of time expected for delivery and hospital stay.  All questions have been answered to pts satisfaction and pt desires to proceed as noted.  FOB to cut the cord        Electronically signed by Marisa Delacruz DO, 03/11/25, 7:40 AM EDT.

## 2025-03-12 ENCOUNTER — PREP FOR SURGERY (OUTPATIENT)
Dept: OTHER | Facility: HOSPITAL | Age: 35
End: 2025-03-12
Payer: MEDICAID

## 2025-03-12 DIAGNOSIS — Z30.2 REQUEST FOR STERILIZATION: Primary | ICD-10-CM

## 2025-03-12 LAB — GLUCOSE BLDC GLUCOMTR-MCNC: 73 MG/DL (ref 70–99)

## 2025-03-12 PROCEDURE — 0503F POSTPARTUM CARE VISIT: CPT | Performed by: OBSTETRICS & GYNECOLOGY

## 2025-03-12 PROCEDURE — 82948 REAGENT STRIP/BLOOD GLUCOSE: CPT

## 2025-03-12 RX ORDER — ACETAMINOPHEN 325 MG/1
650 TABLET ORAL EVERY 6 HOURS PRN
Qty: 30 TABLET | Refills: 1 | Status: ON HOLD | OUTPATIENT
Start: 2025-03-12 | End: 2025-03-14 | Stop reason: SDUPTHER

## 2025-03-12 RX ORDER — IBUPROFEN 600 MG/1
600 TABLET, FILM COATED ORAL EVERY 6 HOURS PRN
Qty: 30 TABLET | Refills: 0 | Status: SHIPPED | OUTPATIENT
Start: 2025-03-12 | End: 2025-03-14 | Stop reason: SDUPTHER

## 2025-03-12 RX ORDER — SODIUM CHLORIDE 9 MG/ML
40 INJECTION, SOLUTION INTRAVENOUS AS NEEDED
OUTPATIENT
Start: 2025-03-12 | End: 2025-03-12

## 2025-03-12 RX ORDER — SODIUM CHLORIDE 0.9 % (FLUSH) 0.9 %
3 SYRINGE (ML) INJECTION EVERY 12 HOURS SCHEDULED
OUTPATIENT
Start: 2025-03-12

## 2025-03-12 RX ORDER — SODIUM CHLORIDE, SODIUM LACTATE, POTASSIUM CHLORIDE, CALCIUM CHLORIDE 600; 310; 30; 20 MG/100ML; MG/100ML; MG/100ML; MG/100ML
150 INJECTION, SOLUTION INTRAVENOUS CONTINUOUS
OUTPATIENT
Start: 2025-03-12 | End: 2025-03-12

## 2025-03-12 RX ORDER — ONDANSETRON 2 MG/ML
4 INJECTION INTRAMUSCULAR; INTRAVENOUS EVERY 6 HOURS PRN
OUTPATIENT
Start: 2025-03-12

## 2025-03-12 RX ORDER — SODIUM CHLORIDE 0.9 % (FLUSH) 0.9 %
10 SYRINGE (ML) INJECTION AS NEEDED
OUTPATIENT
Start: 2025-03-12

## 2025-03-12 RX ADMIN — IBUPROFEN 600 MG: 600 TABLET, FILM COATED ORAL at 12:52

## 2025-03-12 RX ADMIN — IBUPROFEN 600 MG: 600 TABLET, FILM COATED ORAL at 05:13

## 2025-03-12 RX ADMIN — ACETAMINOPHEN 650 MG: 325 TABLET ORAL at 14:58

## 2025-03-12 RX ADMIN — ACETAMINOPHEN 650 MG: 325 TABLET ORAL at 09:30

## 2025-03-12 RX ADMIN — DOCUSATE SODIUM 100 MG: 100 CAPSULE, LIQUID FILLED ORAL at 09:30

## 2025-03-12 RX ADMIN — IBUPROFEN 600 MG: 600 TABLET, FILM COATED ORAL at 18:09

## 2025-03-12 RX ADMIN — IBUPROFEN 600 MG: 600 TABLET, FILM COATED ORAL at 00:27

## 2025-03-12 RX ADMIN — ACETAMINOPHEN 650 MG: 325 TABLET ORAL at 20:11

## 2025-03-12 RX ADMIN — IBUPROFEN 600 MG: 600 TABLET, FILM COATED ORAL at 23:05

## 2025-03-12 NOTE — PLAN OF CARE
Goal Outcome Evaluation:          3/11/25 at 1746. Transferred to postpartum room via wheelchair.

## 2025-03-12 NOTE — PROGRESS NOTES
PostPartum/PostOp PROGRESS NOTE        Subjective:  Patient has no complaints  Pain controlled  Ambulating  Urinating spontaneously  Lochia decreasing, no bleeding concerns  Desires DC home if baby Dc'd    Objective:     Temp:  [97.5 °F (36.4 °C)-98.7 °F (37.1 °C)] 97.5 °F (36.4 °C)  Heart Rate:  [] 74  Resp:  [16-18] 18  BP: (100-135)/(61-96) 125/83  General- NAD, alert and oriented, appropriate  Psych- Normal mood, good memory  Cardiovascular/Respiratory- Not examined  Abdomen- Fundus firm, non tender and Fundus at U-1  Extremties/DTRs- No edema, bilaterally equal, no signs of DVT    Results from last 7 days   Lab Units 03/11/25  0730   WBC 10*3/mm3 7.74   HEMOGLOBIN g/dL 10.3*   HEMATOCRIT % 30.5*   PLATELETS 10*3/mm3 233       Results from last 7 days   Lab Units 03/11/25  0730   GLUCOSE mg/dL 93   CREATININE mg/dL 0.53*   SODIUM mmol/L 135*   POTASSIUM mmol/L 3.9   CHLORIDE mmol/L 103   AST (SGOT) U/L 18   ALT (SGPT) U/L 10       Blood Sugar in Office          3/11/2025    12:08 3/11/2025    16:29 3/12/2025    08:27   Blood Sugar in Office   Glucose 76  72  73         Results from last 7 days   Lab Units 03/11/25  0730   TREPONEMA PALLIDUM AB TOTAL  Non-Reactive     Assessment:    Post-partum/postop Day:  1  The patient is currently breastfeeding.    Desires DC    Plan:   Routine postpartum/postop care  Shower  Breast feeding support  Discharge home  DC meds reviewed  Follow up scheduled  PP/PO precautions given              Electronically signed by Marisa Delacruz DO, 03/12/25, 8:40 AM EDT.

## 2025-03-12 NOTE — LACTATION NOTE
LC in to see this P6 patient. She states she  her other children and appears very confident. LC allowed Lc to observe this feeding and no concerns seen. Baby was on the breast well with good swallows seen. LC discussed with patient about  normal  breastfeeding behaviors and breastfeeding expectations for the next 2 days and to call as needed for lactation assistance . Patient showed good understanding.

## 2025-03-12 NOTE — PLAN OF CARE
Goal Outcome Evaluation:           Progress: improving  Outcome Evaluation: Post partum day 1, doing well.  Upa d nate in room, performs self care and junie care.  Scheduled Tylenol and Motrin controlling all pain.  VSS.  Performs infant care and breastfeeding on demand.  Expect d/c home in am.

## 2025-03-13 VITALS
BODY MASS INDEX: 27.79 KG/M2 | HEART RATE: 67 BPM | DIASTOLIC BLOOD PRESSURE: 77 MMHG | TEMPERATURE: 97.4 F | HEIGHT: 62 IN | SYSTOLIC BLOOD PRESSURE: 122 MMHG | OXYGEN SATURATION: 100 % | RESPIRATION RATE: 20 BRPM | WEIGHT: 151 LBS

## 2025-03-13 LAB
GLUCOSE BLDC GLUCOMTR-MCNC: 72 MG/DL (ref 70–99)
GLUCOSE BLDC GLUCOMTR-MCNC: 82 MG/DL (ref 70–99)

## 2025-03-13 PROCEDURE — 82948 REAGENT STRIP/BLOOD GLUCOSE: CPT | Performed by: OBSTETRICS & GYNECOLOGY

## 2025-03-13 RX ORDER — IBUPROFEN 600 MG/1
600 TABLET, FILM COATED ORAL EVERY 6 HOURS PRN
Qty: 30 TABLET | Refills: 0 | Status: SHIPPED | OUTPATIENT
Start: 2025-03-13

## 2025-03-13 RX ORDER — ACETAMINOPHEN 325 MG/1
650 TABLET ORAL EVERY 6 HOURS PRN
Qty: 30 TABLET | Refills: 1 | Status: SHIPPED | OUTPATIENT
Start: 2025-03-13

## 2025-03-13 RX ADMIN — DOCUSATE SODIUM 100 MG: 100 CAPSULE, LIQUID FILLED ORAL at 09:07

## 2025-03-13 RX ADMIN — ACETAMINOPHEN 650 MG: 325 TABLET ORAL at 09:07

## 2025-03-13 RX ADMIN — ACETAMINOPHEN 650 MG: 325 TABLET ORAL at 02:43

## 2025-03-13 RX ADMIN — IBUPROFEN 600 MG: 600 TABLET, FILM COATED ORAL at 12:04

## 2025-03-13 RX ADMIN — IBUPROFEN 600 MG: 600 TABLET, FILM COATED ORAL at 05:10

## 2025-03-13 NOTE — LACTATION NOTE
LC in for follow up on breastfeeding progress. Mom states that breastfeeding is going well, but left nipple is sore with feedings. LC notes compression stripe on left nipple, right nipple is intact, but slightly tender. LC discussed techniques for achieving deeper latch a and encouraged mom to call out for assistance when infant was ready for next feeding. Patient is planning on discharge today. LC discussed normal infant output patterns to expect and if infant is not waking by 3 hours to wake and feed using measures shown in the hospital. LC discussed checking to make sure new medications are safe to breastfeed. LC discussed alcohol use and cigarette/second hand smoke around baby and breastfeeding and discussed the impact of street drugs on infants and breastfeeding. LC used the page in the breastfeeding guide to discuss harmful effects of these. Breastfeeding/Lactation expectations and anticipatory guidance discussed for the next two weeks . LC discussed nipple care, plugged ducts, engorgement, and breast infection. LC encouraged mom to see pediatrician two days from discharge for follow up. Patient has a breastpump for home use and LC discussed good pumping guidelines and normal expectations with pumping and storage and preparation of ebm for feedings. LC discussed breastfeeding/lactation resources including the local breastfeeding support group after discharge and when to call the doctor. Patient showed good understanding.

## 2025-03-13 NOTE — ANESTHESIA POSTPROCEDURE EVALUATION
Patient: Patsy Motley    Procedure Summary       Date: 03/11/25 Room / Location:     Anesthesia Start: 0956 Anesthesia Stop: 1746    Procedure: LABOR ANALGESIA Diagnosis:     Scheduled Providers:  Provider: Bernard Moore CRNA    Anesthesia Type: epidural ASA Status: 2            Anesthesia Type: epidural    Vitals  Vitals Value Taken Time   /64 03/12/25 16:37   Temp 36.6 °C (97.9 °F) 03/12/25 16:35   Pulse 75 03/12/25 16:37   Resp 16 03/12/25 16:35   SpO2 100 % 03/11/25 18:30           Post Anesthesia Care and Evaluation    Patient location during evaluation: bedside  Patient participation: complete - patient participated  Level of consciousness: awake  Pain score: 2  Pain management: adequate    Airway patency: patent  Anesthetic complications: No anesthetic complications  PONV Status: none  Cardiovascular status: acceptable and stable  Respiratory status: acceptable  Hydration status: acceptable  Post Neuraxial Block status: Motor and sensory function returned to baseline and No signs or symptoms of PDPH

## 2025-03-13 NOTE — PLAN OF CARE
Goal Outcome Evaluation:  Plan of Care Reviewed With: patient           Outcome Evaluation: VSS. Up ad nate and independent. Cares well for self and infant. BF appropriately. Voiding well. Scheduled tylenol and motrin for pain. no issues noted.

## 2025-03-14 ENCOUNTER — ANESTHESIA (OUTPATIENT)
Dept: PERIOP | Facility: HOSPITAL | Age: 35
End: 2025-03-14
Payer: MEDICAID

## 2025-03-14 ENCOUNTER — ANESTHESIA EVENT (OUTPATIENT)
Dept: PERIOP | Facility: HOSPITAL | Age: 35
End: 2025-03-14
Payer: MEDICAID

## 2025-03-14 ENCOUNTER — APPOINTMENT (OUTPATIENT)
Dept: CT IMAGING | Facility: HOSPITAL | Age: 35
End: 2025-03-14
Payer: MEDICAID

## 2025-03-14 ENCOUNTER — HOSPITAL ENCOUNTER (EMERGENCY)
Facility: HOSPITAL | Age: 35
Discharge: HOME | End: 2025-03-14
Attending: EMERGENCY MEDICINE
Payer: MEDICAID

## 2025-03-14 VITALS
TEMPERATURE: 97.3 F | SYSTOLIC BLOOD PRESSURE: 110 MMHG | BODY MASS INDEX: 26.93 KG/M2 | DIASTOLIC BLOOD PRESSURE: 66 MMHG | OXYGEN SATURATION: 94 % | HEIGHT: 61 IN | HEART RATE: 79 BPM | WEIGHT: 142.64 LBS | RESPIRATION RATE: 20 BRPM

## 2025-03-14 DIAGNOSIS — K35.80 ACUTE APPENDICITIS, UNSPECIFIED ACUTE APPENDICITIS TYPE: Primary | ICD-10-CM

## 2025-03-14 PROBLEM — K35.30 ACUTE APPENDICITIS WITH LOCALIZED PERITONITIS, WITHOUT PERFORATION, ABSCESS, OR GANGRENE: Status: ACTIVE | Noted: 2025-03-14

## 2025-03-14 LAB
ALBUMIN SERPL-MCNC: 3.3 G/DL (ref 3.5–5.2)
ALBUMIN/GLOB SERPL: 1.1 G/DL
ALP SERPL-CCNC: 146 U/L (ref 39–117)
ALT SERPL W P-5'-P-CCNC: 10 U/L (ref 1–33)
ANION GAP SERPL CALCULATED.3IONS-SCNC: 12.5 MMOL/L (ref 5–15)
AST SERPL-CCNC: 15 U/L (ref 1–32)
BACTERIA UR QL AUTO: ABNORMAL /HPF
BASOPHILS # BLD AUTO: 0.03 10*3/MM3 (ref 0–0.2)
BASOPHILS NFR BLD AUTO: 0.3 % (ref 0–1.5)
BILIRUB SERPL-MCNC: <0.2 MG/DL (ref 0–1.2)
BILIRUB UR QL STRIP: NEGATIVE
BUN SERPL-MCNC: 16 MG/DL (ref 6–20)
BUN/CREAT SERPL: 23.5 (ref 7–25)
CALCIUM SPEC-SCNC: 9.6 MG/DL (ref 8.6–10.5)
CHLORIDE SERPL-SCNC: 104 MMOL/L (ref 98–107)
CLARITY UR: CLEAR
CO2 SERPL-SCNC: 22.5 MMOL/L (ref 22–29)
COLOR UR: YELLOW
CREAT SERPL-MCNC: 0.68 MG/DL (ref 0.57–1)
DEPRECATED RDW RBC AUTO: 39 FL (ref 37–54)
EGFRCR SERPLBLD CKD-EPI 2021: 116.6 ML/MIN/1.73
EOSINOPHIL # BLD AUTO: 0.07 10*3/MM3 (ref 0–0.4)
EOSINOPHIL NFR BLD AUTO: 0.8 % (ref 0.3–6.2)
ERYTHROCYTE [DISTWIDTH] IN BLOOD BY AUTOMATED COUNT: 12.9 % (ref 12.3–15.4)
GLOBULIN UR ELPH-MCNC: 3 GM/DL
GLUCOSE SERPL-MCNC: 86 MG/DL (ref 65–99)
GLUCOSE UR STRIP-MCNC: NEGATIVE MG/DL
HCG INTACT+B SERPL-ACNC: 5292 MIU/ML
HCT VFR BLD AUTO: 34.4 % (ref 34–46.6)
HGB BLD-MCNC: 11.3 G/DL (ref 12–15.9)
HGB UR QL STRIP.AUTO: ABNORMAL
HOLD SPECIMEN: NORMAL
HOLD SPECIMEN: NORMAL
HYALINE CASTS UR QL AUTO: ABNORMAL /LPF
IMM GRANULOCYTES # BLD AUTO: 0.06 10*3/MM3 (ref 0–0.05)
IMM GRANULOCYTES NFR BLD AUTO: 0.7 % (ref 0–0.5)
KETONES UR QL STRIP: NEGATIVE
LEUKOCYTE ESTERASE UR QL STRIP.AUTO: ABNORMAL
LIPASE SERPL-CCNC: 30 U/L (ref 13–60)
LYMPHOCYTES # BLD AUTO: 0.87 10*3/MM3 (ref 0.7–3.1)
LYMPHOCYTES NFR BLD AUTO: 9.6 % (ref 19.6–45.3)
MCH RBC QN AUTO: 27.7 PG (ref 26.6–33)
MCHC RBC AUTO-ENTMCNC: 32.8 G/DL (ref 31.5–35.7)
MCV RBC AUTO: 84.3 FL (ref 79–97)
MONOCYTES # BLD AUTO: 0.52 10*3/MM3 (ref 0.1–0.9)
MONOCYTES NFR BLD AUTO: 5.8 % (ref 5–12)
NEUTROPHILS NFR BLD AUTO: 7.47 10*3/MM3 (ref 1.7–7)
NEUTROPHILS NFR BLD AUTO: 82.8 % (ref 42.7–76)
NITRITE UR QL STRIP: NEGATIVE
NRBC BLD AUTO-RTO: 0 /100 WBC (ref 0–0.2)
PH UR STRIP.AUTO: 7 [PH] (ref 5–8)
PLATELET # BLD AUTO: 217 10*3/MM3 (ref 140–450)
PMV BLD AUTO: 8.8 FL (ref 6–12)
POTASSIUM SERPL-SCNC: 4.1 MMOL/L (ref 3.5–5.2)
PROT SERPL-MCNC: 6.3 G/DL (ref 6–8.5)
PROT UR QL STRIP: NEGATIVE
RBC # BLD AUTO: 4.08 10*6/MM3 (ref 3.77–5.28)
RBC # UR STRIP: ABNORMAL /HPF
REF LAB TEST METHOD: ABNORMAL
SODIUM SERPL-SCNC: 139 MMOL/L (ref 136–145)
SP GR UR STRIP: 1.01 (ref 1–1.03)
SQUAMOUS #/AREA URNS HPF: ABNORMAL /HPF
UROBILINOGEN UR QL STRIP: ABNORMAL
WBC # UR STRIP: ABNORMAL /HPF
WBC NRBC COR # BLD AUTO: 9.02 10*3/MM3 (ref 3.4–10.8)
WHOLE BLOOD HOLD COAG: NORMAL
WHOLE BLOOD HOLD SPECIMEN: NORMAL

## 2025-03-14 PROCEDURE — 99285 EMERGENCY DEPT VISIT HI MDM: CPT

## 2025-03-14 PROCEDURE — 25010000002 MIDAZOLAM PER 1MG: Performed by: ANESTHESIOLOGY

## 2025-03-14 PROCEDURE — 25010000002 CEFOXITIN PER 1 G: Performed by: SURGERY

## 2025-03-14 PROCEDURE — 25010000002 LIDOCAINE PF 2% 2 % SOLUTION

## 2025-03-14 PROCEDURE — 25010000002 ONDANSETRON PER 1 MG

## 2025-03-14 PROCEDURE — 25010000002 MORPHINE PER 10 MG: Performed by: EMERGENCY MEDICINE

## 2025-03-14 PROCEDURE — 25810000003 LACTATED RINGERS PER 1000 ML: Performed by: ANESTHESIOLOGY

## 2025-03-14 PROCEDURE — 25010000002 DEXAMETHASONE PER 1 MG

## 2025-03-14 PROCEDURE — 25010000002 ONDANSETRON PER 1 MG: Performed by: EMERGENCY MEDICINE

## 2025-03-14 PROCEDURE — 85025 COMPLETE CBC W/AUTO DIFF WBC: CPT

## 2025-03-14 PROCEDURE — 25010000002 PROPOFOL 10 MG/ML EMULSION

## 2025-03-14 PROCEDURE — 80053 COMPREHEN METABOLIC PANEL: CPT

## 2025-03-14 PROCEDURE — 96374 THER/PROPH/DIAG INJ IV PUSH: CPT

## 2025-03-14 PROCEDURE — 25010000002 FENTANYL CITRATE (PF) 50 MCG/ML SOLUTION

## 2025-03-14 PROCEDURE — 36415 COLL VENOUS BLD VENIPUNCTURE: CPT

## 2025-03-14 PROCEDURE — 81001 URINALYSIS AUTO W/SCOPE: CPT

## 2025-03-14 PROCEDURE — 44970 LAPAROSCOPY APPENDECTOMY: CPT | Performed by: SURGERY

## 2025-03-14 PROCEDURE — 25510000001 IOPAMIDOL PER 1 ML: Performed by: EMERGENCY MEDICINE

## 2025-03-14 PROCEDURE — 25010000002 SUGAMMADEX 200 MG/2ML SOLUTION

## 2025-03-14 PROCEDURE — 25010000002 HYDROMORPHONE 1 MG/ML SOLUTION

## 2025-03-14 PROCEDURE — 84702 CHORIONIC GONADOTROPIN TEST: CPT

## 2025-03-14 PROCEDURE — 74177 CT ABD & PELVIS W/CONTRAST: CPT

## 2025-03-14 PROCEDURE — 83690 ASSAY OF LIPASE: CPT

## 2025-03-14 PROCEDURE — 96375 TX/PRO/DX INJ NEW DRUG ADDON: CPT

## 2025-03-14 PROCEDURE — 25010000002 BUPIVACAINE (PF) 0.25 % SOLUTION: Performed by: SURGERY

## 2025-03-14 PROCEDURE — 88304 TISSUE EXAM BY PATHOLOGIST: CPT | Performed by: SURGERY

## 2025-03-14 DEVICE — ENDOPATH ETS45 2.5MM RELOADS (VASCULAR/THIN)
Type: IMPLANTABLE DEVICE | Site: ABDOMEN | Status: FUNCTIONAL
Brand: ENDOPATH

## 2025-03-14 RX ORDER — SODIUM CHLORIDE 0.9 % (FLUSH) 0.9 %
10 SYRINGE (ML) INJECTION EVERY 12 HOURS SCHEDULED
Status: DISCONTINUED | OUTPATIENT
Start: 2025-03-14 | End: 2025-03-14 | Stop reason: HOSPADM

## 2025-03-14 RX ORDER — PROMETHAZINE HYDROCHLORIDE 25 MG/1
25 SUPPOSITORY RECTAL ONCE AS NEEDED
Status: DISCONTINUED | OUTPATIENT
Start: 2025-03-14 | End: 2025-03-14 | Stop reason: HOSPADM

## 2025-03-14 RX ORDER — ONDANSETRON 4 MG/1
4 TABLET, ORALLY DISINTEGRATING ORAL ONCE AS NEEDED
Status: DISCONTINUED | OUTPATIENT
Start: 2025-03-14 | End: 2025-03-14 | Stop reason: HOSPADM

## 2025-03-14 RX ORDER — SODIUM CHLORIDE, SODIUM LACTATE, POTASSIUM CHLORIDE, CALCIUM CHLORIDE 600; 310; 30; 20 MG/100ML; MG/100ML; MG/100ML; MG/100ML
9 INJECTION, SOLUTION INTRAVENOUS CONTINUOUS PRN
Status: DISCONTINUED | OUTPATIENT
Start: 2025-03-14 | End: 2025-03-14 | Stop reason: HOSPADM

## 2025-03-14 RX ORDER — DEXAMETHASONE SODIUM PHOSPHATE 4 MG/ML
INJECTION, SOLUTION INTRA-ARTICULAR; INTRALESIONAL; INTRAMUSCULAR; INTRAVENOUS; SOFT TISSUE AS NEEDED
Status: DISCONTINUED | OUTPATIENT
Start: 2025-03-14 | End: 2025-03-14 | Stop reason: SURG

## 2025-03-14 RX ORDER — SCOPOLAMINE 1 MG/3D
1 PATCH, EXTENDED RELEASE TRANSDERMAL ONCE
Status: DISCONTINUED | OUTPATIENT
Start: 2025-03-14 | End: 2025-03-14 | Stop reason: HOSPADM

## 2025-03-14 RX ORDER — HYDROCODONE BITARTRATE AND ACETAMINOPHEN 5; 325 MG/1; MG/1
1 TABLET ORAL ONCE AS NEEDED
Status: DISCONTINUED | OUTPATIENT
Start: 2025-03-14 | End: 2025-03-14 | Stop reason: HOSPADM

## 2025-03-14 RX ORDER — PHENYLEPHRINE HCL IN 0.9% NACL 0.5 MG/5ML
SYRINGE (ML) INTRAVENOUS AS NEEDED
Status: DISCONTINUED | OUTPATIENT
Start: 2025-03-14 | End: 2025-03-14 | Stop reason: SURG

## 2025-03-14 RX ORDER — ONDANSETRON 2 MG/ML
4 INJECTION INTRAMUSCULAR; INTRAVENOUS ONCE AS NEEDED
Status: DISCONTINUED | OUTPATIENT
Start: 2025-03-14 | End: 2025-03-14 | Stop reason: HOSPADM

## 2025-03-14 RX ORDER — HYDROCODONE BITARTRATE AND ACETAMINOPHEN 5; 325 MG/1; MG/1
1 TABLET ORAL EVERY 6 HOURS PRN
Qty: 10 TABLET | Refills: 0 | Status: SHIPPED | OUTPATIENT
Start: 2025-03-14

## 2025-03-14 RX ORDER — ONDANSETRON 2 MG/ML
4 INJECTION INTRAMUSCULAR; INTRAVENOUS ONCE
Status: COMPLETED | OUTPATIENT
Start: 2025-03-14 | End: 2025-03-14

## 2025-03-14 RX ORDER — DEXMEDETOMIDINE HYDROCHLORIDE 100 UG/ML
INJECTION, SOLUTION INTRAVENOUS AS NEEDED
Status: DISCONTINUED | OUTPATIENT
Start: 2025-03-14 | End: 2025-03-14 | Stop reason: SURG

## 2025-03-14 RX ORDER — KETOROLAC TROMETHAMINE 30 MG/ML
30 INJECTION, SOLUTION INTRAMUSCULAR; INTRAVENOUS ONCE
Status: DISCONTINUED | OUTPATIENT
Start: 2025-03-14 | End: 2025-03-14

## 2025-03-14 RX ORDER — SODIUM CHLORIDE 0.9 % (FLUSH) 0.9 %
10 SYRINGE (ML) INJECTION AS NEEDED
Status: DISCONTINUED | OUTPATIENT
Start: 2025-03-14 | End: 2025-03-14 | Stop reason: HOSPADM

## 2025-03-14 RX ORDER — ACETAMINOPHEN 500 MG
1000 TABLET ORAL ONCE
Status: COMPLETED | OUTPATIENT
Start: 2025-03-14 | End: 2025-03-14

## 2025-03-14 RX ORDER — MAGNESIUM HYDROXIDE 1200 MG/15ML
LIQUID ORAL AS NEEDED
Status: DISCONTINUED | OUTPATIENT
Start: 2025-03-14 | End: 2025-03-14 | Stop reason: HOSPADM

## 2025-03-14 RX ORDER — LIDOCAINE HYDROCHLORIDE 20 MG/ML
INJECTION, SOLUTION EPIDURAL; INFILTRATION; INTRACAUDAL; PERINEURAL AS NEEDED
Status: DISCONTINUED | OUTPATIENT
Start: 2025-03-14 | End: 2025-03-14 | Stop reason: SURG

## 2025-03-14 RX ORDER — PROPOFOL 10 MG/ML
VIAL (ML) INTRAVENOUS AS NEEDED
Status: DISCONTINUED | OUTPATIENT
Start: 2025-03-14 | End: 2025-03-14 | Stop reason: SURG

## 2025-03-14 RX ORDER — ROCURONIUM BROMIDE 10 MG/ML
INJECTION, SOLUTION INTRAVENOUS AS NEEDED
Status: DISCONTINUED | OUTPATIENT
Start: 2025-03-14 | End: 2025-03-14 | Stop reason: SURG

## 2025-03-14 RX ORDER — IBUPROFEN 600 MG/1
600 TABLET, FILM COATED ORAL EVERY 6 HOURS PRN
Status: DISCONTINUED | OUTPATIENT
Start: 2025-03-14 | End: 2025-03-14 | Stop reason: HOSPADM

## 2025-03-14 RX ORDER — ONDANSETRON 2 MG/ML
4 INJECTION INTRAMUSCULAR; INTRAVENOUS EVERY 6 HOURS PRN
Status: DISCONTINUED | OUTPATIENT
Start: 2025-03-14 | End: 2025-03-14 | Stop reason: SDUPTHER

## 2025-03-14 RX ORDER — SUCCINYLCHOLINE/SOD CL,ISO/PF 100 MG/5ML
SYRINGE (ML) INTRAVENOUS AS NEEDED
Status: DISCONTINUED | OUTPATIENT
Start: 2025-03-14 | End: 2025-03-14 | Stop reason: SURG

## 2025-03-14 RX ORDER — PROMETHAZINE HYDROCHLORIDE 25 MG/1
25 TABLET ORAL ONCE AS NEEDED
Status: DISCONTINUED | OUTPATIENT
Start: 2025-03-14 | End: 2025-03-14 | Stop reason: HOSPADM

## 2025-03-14 RX ORDER — MIDAZOLAM HYDROCHLORIDE 2 MG/2ML
2 INJECTION, SOLUTION INTRAMUSCULAR; INTRAVENOUS ONCE
Status: COMPLETED | OUTPATIENT
Start: 2025-03-14 | End: 2025-03-14

## 2025-03-14 RX ORDER — IOPAMIDOL 755 MG/ML
100 INJECTION, SOLUTION INTRAVASCULAR
Status: COMPLETED | OUTPATIENT
Start: 2025-03-14 | End: 2025-03-14

## 2025-03-14 RX ORDER — ONDANSETRON 2 MG/ML
INJECTION INTRAMUSCULAR; INTRAVENOUS AS NEEDED
Status: DISCONTINUED | OUTPATIENT
Start: 2025-03-14 | End: 2025-03-14 | Stop reason: SURG

## 2025-03-14 RX ORDER — FENTANYL CITRATE 50 UG/ML
INJECTION, SOLUTION INTRAMUSCULAR; INTRAVENOUS AS NEEDED
Status: DISCONTINUED | OUTPATIENT
Start: 2025-03-14 | End: 2025-03-14 | Stop reason: SURG

## 2025-03-14 RX ORDER — BUPIVACAINE HYDROCHLORIDE 2.5 MG/ML
INJECTION, SOLUTION EPIDURAL; INFILTRATION; INTRACAUDAL AS NEEDED
Status: DISCONTINUED | OUTPATIENT
Start: 2025-03-14 | End: 2025-03-14 | Stop reason: HOSPADM

## 2025-03-14 RX ORDER — ONDANSETRON 2 MG/ML
4 INJECTION INTRAMUSCULAR; INTRAVENOUS ONCE AS NEEDED
Status: DISCONTINUED | OUTPATIENT
Start: 2025-03-14 | End: 2025-03-14 | Stop reason: SDUPTHER

## 2025-03-14 RX ORDER — SODIUM CHLORIDE 9 MG/ML
40 INJECTION, SOLUTION INTRAVENOUS AS NEEDED
Status: DISCONTINUED | OUTPATIENT
Start: 2025-03-14 | End: 2025-03-14 | Stop reason: HOSPADM

## 2025-03-14 RX ORDER — OXYCODONE HYDROCHLORIDE 5 MG/1
5 TABLET ORAL
Status: DISCONTINUED | OUTPATIENT
Start: 2025-03-14 | End: 2025-03-14 | Stop reason: SDUPTHER

## 2025-03-14 RX ADMIN — Medication 100 MCG: at 13:03

## 2025-03-14 RX ADMIN — DEXMEDETOMIDINE 20 MCG: 100 INJECTION, SOLUTION, CONCENTRATE INTRAVENOUS at 12:59

## 2025-03-14 RX ADMIN — Medication 60 MG: at 12:40

## 2025-03-14 RX ADMIN — HYDROCODONE BITARTRATE AND ACETAMINOPHEN 1 TABLET: 5; 325 TABLET ORAL at 14:22

## 2025-03-14 RX ADMIN — ROCURONIUM BROMIDE 45 MG: 10 INJECTION, SOLUTION INTRAVENOUS at 12:43

## 2025-03-14 RX ADMIN — ROCURONIUM BROMIDE 5 MG: 10 INJECTION, SOLUTION INTRAVENOUS at 12:40

## 2025-03-14 RX ADMIN — DEXAMETHASONE SODIUM PHOSPHATE 4 MG: 4 INJECTION, SOLUTION INTRAMUSCULAR; INTRAVENOUS at 12:50

## 2025-03-14 RX ADMIN — Medication 10 ML: at 09:03

## 2025-03-14 RX ADMIN — HYDROMORPHONE HYDROCHLORIDE 0.5 MG: 1 INJECTION, SOLUTION INTRAMUSCULAR; INTRAVENOUS; SUBCUTANEOUS at 13:38

## 2025-03-14 RX ADMIN — MORPHINE SULFATE 4 MG: 4 INJECTION, SOLUTION INTRAMUSCULAR; INTRAVENOUS at 09:04

## 2025-03-14 RX ADMIN — ONDANSETRON 4 MG: 2 INJECTION INTRAMUSCULAR; INTRAVENOUS at 09:02

## 2025-03-14 RX ADMIN — IOPAMIDOL 100 ML: 755 INJECTION, SOLUTION INTRAVENOUS at 08:44

## 2025-03-14 RX ADMIN — LIDOCAINE HYDROCHLORIDE 40 MG: 20 INJECTION, SOLUTION INTRAVENOUS at 12:40

## 2025-03-14 RX ADMIN — PROPOFOL 120 MG: 10 INJECTION, EMULSION INTRAVENOUS at 12:40

## 2025-03-14 RX ADMIN — SCOLOPAMINE TRANSDERMAL SYSTEM 1 PATCH: 1 PATCH, EXTENDED RELEASE TRANSDERMAL at 12:31

## 2025-03-14 RX ADMIN — SUGAMMADEX 200 MG: 100 INJECTION, SOLUTION INTRAVENOUS at 13:29

## 2025-03-14 RX ADMIN — SODIUM CHLORIDE 2 G: 9 INJECTION, SOLUTION INTRAVENOUS at 12:45

## 2025-03-14 RX ADMIN — MIDAZOLAM HYDROCHLORIDE 2 MG: 1 INJECTION, SOLUTION INTRAMUSCULAR; INTRAVENOUS at 12:31

## 2025-03-14 RX ADMIN — ACETAMINOPHEN 1000 MG: 500 TABLET ORAL at 12:31

## 2025-03-14 RX ADMIN — ONDANSETRON 4 MG: 2 INJECTION INTRAMUSCULAR; INTRAVENOUS at 12:50

## 2025-03-14 RX ADMIN — SODIUM CHLORIDE, SODIUM LACTATE, POTASSIUM CHLORIDE, AND CALCIUM CHLORIDE 9 ML/HR: .6; .31; .03; .02 INJECTION, SOLUTION INTRAVENOUS at 12:30

## 2025-03-14 RX ADMIN — FENTANYL CITRATE 100 MCG: 50 INJECTION, SOLUTION INTRAMUSCULAR; INTRAVENOUS at 12:40

## 2025-03-14 NOTE — DISCHARGE INSTRUCTIONS
DISCHARGE INSTRUCTIONS LAPAROSCOPIC APPENDECTOMY        For your surgery you had:  General anesthesia (you may have a sore throat for the first 24 hours)     You may experience dizziness, drowsiness, or light-headedness for several hours following surgery.  Do not stay alone tonight.  Limit your activity for 24 hours.  Resume your diet slowly.  Follow whatever special dietary instructions you may have been given by your doctor.  You should not drive, operate machinery, drink alcohol, or sign legally binding documents for 24 hours or while you are taking pain medication.    NOTIFY YOUR DOCTOR IF YOU EXPERIENCE ANY OF THE FOLLOWING:  Temperature greater than 101 degrees Fahrenheit  Shaking Chills  Redness or excessive drainage from incision  Nausea, vomiting and/or pain that is not controlled by prescribed medications  Increase in bleeding or bleeding that is excessive  Unable to urinate in 6 hours after surgery  If unable to reach your doctor, please go to the closest Emergency Room Skin adhesive flakes off in 10-14 days.  You may shower tomorrow, no tub bathing, swimming or submerging of incisions for 2 weeks.  Apply an ice pack 24-48 hours.  You may experience gas discomfort 24-48 hours after discharge, especially in chest and shoulders.  Changing position frequently may alleviate this discomfort.  If you have excessive pain, swelling, redness, drainage or other problems, notify your physician.  If unable to urinate in 6 to 8 hours after surgery or urinating frequently in small amounts, notify your doctor or go to the nearest Emergency Room.  Medications per physician instructions as indicated on After Visit Luisito.    Last dose of pain medication was given at:    .    SPECIAL INSTRUCTIONS:

## 2025-03-14 NOTE — OP NOTE
APPENDECTOMY LAPAROSCOPIC  Procedure Report    Patient Name:  Patsy Motley  YOB: 1990    Date of Surgery:  3/14/2025     Indications: The patient is a 34-year-old female who presented with acute appendicitis.  The decision was made to proceed with a laparoscopic appendectomy.    Pre-op Diagnosis: Acute appendicitis    Post-Op Diagnosis: Same    Procedure/CPT® Codes:    Laparoscopic appendectomy    Staff:  Surgeon(s):  Bernard Miles MD    Assistant: Timbo Bland    Anesthesia: General    Estimated Blood Loss: minimal    Implants:    Implant Name Type Inv. Item Serial No.  Lot No. LRB No. Used Action   RELOAD STPLR ENDOPATH/ETS LNR/CUT THN 45MM WHT - [id number] Implant RELOAD STPLR ENDOPATH/ETS LNR/CUT THN 45MM WHT  [employer]  DIV OF J AND J [id number] N/A 1 Implanted       Specimen:          Specimens       ID Source Type Tests Collected By Collected At Frozen?    A Large Intestine, Appendix Tissue TISSUE PATHOLOGY EXAM   Bernard Miles MD 3/14/25 1316     Description: Appendix                Findings: Acute appendicitis    Complications: None    Description of Procedure: The patient was taken to the operating room and placed on the table in supine position.  After induction of general endotracheal anesthesia, the abdomen was prepped and draped sterilely.  The abdomen was insufflated with a Veress needle and a 5 mm supraumbilical port was placed under direct vision using a Visiport.  A 5 mm left lower quadrant port and a 12 mm left flank port were then placed under direct vision.  An acutely inflamed nonruptured appendix was identified.  Mesoappendix was taken down using a LigaSure dissector and the base of the appendix was divided flush with the cecum with a firing of the 45 linear cutting stapler.  Appendix was placed in an Endopouch bag and brought out through the 12 mm port site.  The ports were replaced and the abdomen was reinsufflated.  We had good  hemostasis and the staple line looked secure.  There was a small amount of fluid in the right lower quadrant and this was easily suctioned out.  The 12 mm port site was then closed with a Felix-Angelika closure device and a 0 Mersilene tie.  All skin incisions were closed with 4-0 Vicryl subcuticular sutures and sterile dressings were applied.  The patient was taken to the postanesthesia recovery room in stable condition.      Assistant: Timbo Bland  was responsible for performing the following activities: Retraction, Placing Dressing, and Held/Positioned Camera and their skilled assistance was necessary for the success of this case.    Bernard Miles MD     Date: 3/14/2025  Time: 13:29 EDT

## 2025-03-14 NOTE — DISCHARGE SUMMARY
[facility]         DISCHARGE SUMMARY    Patient Name: Patsy Motley  : 1990  MRN: [id number]    Date of Admission: 3/14/2025  Date of Discharge:  3/14/2025  Primary Care Physician: Provider, No Known    Consults       Date and Time Order Name Status Description    3/14/2025  9:06 AM IP General Consult (Use specialty-specific consult if known)              Presenting Problem:   Acute appendicitis, unspecified acute appendicitis type [K35.80]    Active and Resolved Hospital Problems:  Active Hospital Problems    Diagnosis POA    **Acute appendicitis [K35.80] Unknown    Acute appendicitis with localized peritonitis, without perforation, abscess, or gangrene [K35.30] Unknown      Resolved Hospital Problems   No resolved problems to display.         Hospital Course     Hospital Course:  Patsy Motley is a 34 y.o. female who presented to our emergency room in the morning of 3/14/2025.  She presented with a 1 day history of worsening right lower quadrant pain.  She had a CT scan that showed evidence of acute nonruptured appendicitis.  She was taken to the operating room that afternoon and underwent a laparoscopic appendectomy.  She was discharged to home later that afternoon.  She will have follow-up in 2 weeks.    Day of Discharge     Vital Signs:  Temp:  [97.9 °F (36.6 °C)-99 °F (37.2 °C)] 97.9 °F (36.6 °C)  Heart Rate:  [81-97] 92  Resp:  [15-22] 16  BP: (129-144)/(81-88) 132/88    Pertinent  and/or Most Recent Results     LAB RESULTS:      Lab 25  02025  0730   WBC 9.02 7.74   HEMOGLOBIN 11.3* 10.3*   HEMATOCRIT 34.4 30.5*   PLATELETS 217 233   NEUTROS ABS 7.47*  --    IMMATURE GRANS (ABS) 0.06*  --    LYMPHS ABS 0.87  --    MONOS ABS 0.52  --    EOS ABS 0.07  --    MCV 84.3 85.2         Lab 25  0209 25  0730   SODIUM 139 135*   POTASSIUM 4.1 3.9   CHLORIDE 104 103   CO2 22.5 18.1*   ANION GAP 12.5 13.9   BUN 16 13   CREATININE 0.68 0.53*   EGFR 116.6 123.9    GLUCOSE 86 93   CALCIUM 9.6 8.9         Lab 03/14/25  0209 03/11/25  0730   TOTAL PROTEIN 6.3 6.6   ALBUMIN 3.3* 3.4*   GLOBULIN 3.0 3.2   ALT (SGPT) 10 10   AST (SGOT) 15 18   BILIRUBIN <0.2 0.3   ALK PHOS 146* 188*   LIPASE 30  --                  Lab 03/11/25  0730   ABO TYPING O   RH TYPING Positive   ANTIBODY SCREEN Negative         Brief Urine Lab Results  (Last result in the past 365 days)        Color   Clarity   Blood   Leuk Est   Nitrite   Protein   CREAT   Urine HCG        03/14/25 0216 Yellow   Clear   Moderate (2+)   Trace   Negative   Negative                 Microbiology Results (last 10 days)       ** No results found for the last 240 hours. **            CT Abdomen Pelvis With Contrast  Result Date: 3/14/2025  Impression: 1.Findings compatible with acute appendicitis. No evidence of perforation or abscess formation at this time. 2.Postpartum appearance of the uterus. 3.Nonobstructing renal calculi. 4.Indeterminate low-attenuation lesions within the right kidney. Recommend follow-up with nonemergent ultrasound. 5.Borderline splenomegaly. Please correlate clinically 6.Other findings as above. Findings discussed with ordering provider at the time of interpretation. Electronically Signed: Kaerem Sanders MD  3/14/2025 9:02 AM EDT  Workstation ID: [id number]      Results for orders placed during the hospital encounter of 05/17/22    Duplex Venous Lower Extremity - Bilateral CAR    Interpretation Summary  · Normal bilateral lower extremity venous duplex scan.      Results for orders placed during the hospital encounter of 05/17/22    Duplex Venous Lower Extremity - Bilateral CAR    Interpretation Summary  · Normal bilateral lower extremity venous duplex scan.          Labs Pending at Discharge:  Pending Labs       Order Current Status    Tissue Pathology Exam Collected (03/14/25 7256)              Discharge Details        Discharge Medications        New Medications        Instructions Start Date    HYDROcodone-acetaminophen 5-325 MG per tablet  Commonly known as: NORCO   1 tablet, Oral, Every 6 Hours PRN             Continue These Medications        Instructions Start Date   acetaminophen 325 MG tablet  Commonly known as: Tylenol   650 mg, Oral, Every 6 Hours PRN      ferrous sulfate 325 (65 FE) MG tablet   325 mg, Oral, Every Other Day      ibuprofen 600 MG tablet  Commonly known as: ADVIL,MOTRIN   600 mg, Oral, Every 6 Hours PRN      prenatal vitamin 28-0.8 28-0.8 MG tablet tablet   1 tablet, Oral, Daily               No Known Allergies      Discharge Disposition:  Home or Self Care    Diet:        Discharge Activity:   Activity Instructions       Activity as Tolerated                CODE STATUS:  There are no questions and answers to display.         Future Appointments   Date Time Provider Department Center   4/8/2025  1:15 PM Marisa Delacruz DO [facility]       Additional Instructions for the Follow-ups that You Need to Schedule       Discharge Follow-up with Specified Provider: Dr. Miles; 2 Weeks   As directed      To: Dr. Miles   Follow Up: 2 Weeks        Notify Physician or Go To The ED For the Following Conditions   As directed      -Severe Pain  -Excessive Bleeding  -Fever over 102    Order Comments: -Severe Pain -Excessive Bleeding -Fever over 102                   Electronically signed by Bernard Miles MD, 03/14/25, 1:35 PM EDT.

## 2025-03-14 NOTE — ED PROVIDER NOTES
Time: 7:49 AM EDT  Date of encounter:  3/14/2025  Independent Historian/Clinical History and Information was obtained by:   Patient    History is limited by: N/A    Chief Complaint: Abdominal pain      History of Present Illness:  Patient is a 34 y.o. year old female who presents to the emergency department for evaluation of abdominal pain x 12 hours.  Patient had a vaginal delivery on  and was staying in the hospital and was discharged yesterday without any issues per patient.  Patient does have history of cervical prolapse during the pregnancy but says that did not cause any complications and received no products while in the hospital.  Yesterday around 4 PM patient says that her 1-year-old kicked her in the stomach and around 2 hours later she started having right upper/right lower quadrant tenderness and issues walking around due to the pain.  Patient says the pain is achy and is a 9 out of 10.  Patient says she tried Tylenol and ibuprofen with mild benefit reducing the pain to a 7 out of 10.  Patient says the pain is worse with a deep breath.  Patient does feel nauseous as well.  Patient pregnancy history is -0-1-6.  Patient states she does not have any abnormally heavy vaginal bleeding or passage of large clots.  She denies any lower pelvic pain.  No previous abdominal surgeries.      Patient Care Team  Primary Care Provider: Provider, No Known    Past Medical History:     No Known Allergies  Past Medical History:   Diagnosis Date    Abnormal Pap smear of cervix     hx Gestational diabetes     Pre gestational diabetes mellitus      Past Surgical History:   Procedure Laterality Date    NO PAST SURGERIES       Family History   Problem Relation Age of Onset    Heart disease Father     Heart disease Mother     Ovarian cancer Neg Hx     Uterine cancer Neg Hx     Breast cancer - additional onset Neg Hx     Breast cancer Neg Hx     Colon cancer Neg Hx     Prostate cancer Neg Hx     Clotting disorder Neg  Hx     Alcohol abuse Neg Hx     Arthritis Neg Hx     Asthma Neg Hx     Birth defects Neg Hx     Bleeding Disorder Neg Hx     COPD Neg Hx     Depression Neg Hx     Cancer Neg Hx     Diabetes Neg Hx     Drug abuse Neg Hx     Early death Neg Hx     Hearing loss Neg Hx     Hyperlipidemia Neg Hx     Hypertension Neg Hx     Learning disabilities Neg Hx     Malig Hyperthermia Neg Hx     Kidney disease Neg Hx     Mental illness Neg Hx     Miscarriages / Stillbirths Neg Hx     Mental retardation Neg Hx     Deep vein thrombosis Neg Hx     Pulmonary embolism Neg Hx        Home Medications:  Prior to Admission medications    Medication Sig Start Date End Date Taking? Authorizing Provider   acetaminophen (Tylenol) 325 MG tablet Take 2 tablets by mouth Every 6 (Six) Hours As Needed for Mild Pain (alternate with motrin). 3/12/25  Yes Marisa Delacruz DO   ferrous sulfate 325 (65 FE) MG tablet Take 1 tablet by mouth Every Other Day. 2/7/25  Yes Marisa Delacruz DO   ibuprofen (ADVIL,MOTRIN) 600 MG tablet Take 1 tablet by mouth Every 6 (Six) Hours As Needed for Mild Pain. 3/12/25  Yes Marisa Delacruz DO   Prenatal Vit-Fe Fumarate-FA (prenatal vitamin 28-0.8) 28-0.8 MG tablet tablet TAKE 1 TABLET BY MOUTH EVERY DAY 2/26/25  Yes Mindy Flores DO   acetaminophen (Tylenol) 325 MG tablet Take 2 tablets by mouth Every 6 (Six) Hours As Needed for Mild Pain (alternate with motrin). 3/13/25   Marisa Delacruz DO   Alcohol Swabs pads Use to clean fingers before checking BS 4 times per day and PRN 10/4/24   Marisa Delacruz DO   Blood Glucose Monitoring Suppl (FreeStyle Lite) w/Device kit CHECK BLOOD SUGAR FOUR TIMES DAILY FASTING AND 2 HOURS AFTER MEALS AND RECORD. BRING RECORD TO OFFICE VISITS 10/4/24   Prudence Waller MD   Docosahexaenoic Acid (DHA Omega 3) 100 MG capsule Take  by mouth.    Prudence Waller MD   glucose blood test strip 1 each by Other route 4 (Four) Times a Day. Check BS 4x/day as instructed. 10/4/24   Marisa Delacruz DO   glucose monitor  "monitoring kit Check BS FOUR times per day (fasting and 2hrs after meals) and record.  Bring blood sugar record to next office visit. 10/4/24   Marisa Delacruz DO   ibuprofen (ADVIL,MOTRIN) 600 MG tablet Take 1 tablet by mouth Every 6 (Six) Hours As Needed for Mild Pain. 3/13/25   Marisa Delacruz DO   Lancets misc Use 120 each 4 (Four) Times a Day. Check blood sugars four times daily as directed 10/4/24   Marisa Delacruz DO        Social History:   Social History     Tobacco Use    Smoking status: Former     Types: Cigarettes     Start date: 2021    Tobacco comments:     Vape   Vaping Use    Vaping status: Former    Substances: Nicotine, Flavoring    Devices: Disposable   Substance Use Topics    Alcohol use: Not Currently    Drug use: Not Currently     Types: Marijuana         Review of Systems:  Review of Systems   Constitutional:  Negative for fever.   Respiratory:  Negative for shortness of breath.    Cardiovascular:  Negative for chest pain.   Gastrointestinal:  Positive for abdominal pain and nausea. Negative for constipation, diarrhea and vomiting.   Genitourinary:  Positive for vaginal bleeding (Patient says vaginal bleeding is same now that she is experienced with her previous pregnancies and not worse). Negative for dysuria, flank pain, hematuria, menstrual problem, pelvic pain and vaginal discharge.   Neurological:  Negative for syncope, weakness, numbness and headaches.        Physical Exam:  /66   Pulse 79   Temp 97.3 °F (36.3 °C) (Temporal)   Resp 20   Ht 154.9 cm (61\")   Wt 64.7 kg (142 lb 10.2 oz)   LMP 06/10/2024   SpO2 94%   Breastfeeding Yes   BMI 26.95 kg/m²     Physical Exam  Vitals and nursing note reviewed.   Constitutional:       General: She is not in acute distress.     Appearance: She is well-developed and normal weight. She is not ill-appearing or toxic-appearing.   HENT:      Head: Normocephalic and atraumatic.   Cardiovascular:      Rate and Rhythm: Normal rate and regular rhythm.     "  Heart sounds: Normal heart sounds. No murmur heard.     No friction rub. No gallop.   Pulmonary:      Effort: Pulmonary effort is normal.      Breath sounds: Normal breath sounds.   Abdominal:      General: Abdomen is flat. Bowel sounds are normal. There is no distension or abdominal bruit. There are no signs of injury.      Palpations: Abdomen is soft. There is no shifting dullness, fluid wave, hepatomegaly, splenomegaly, mass or pulsatile mass.      Tenderness: There is generalized abdominal tenderness (Patient has generalized tenderness with pain being worse in the right upper and right lower quadrant.) and tenderness in the right upper quadrant and right lower quadrant. There is no right CVA tenderness, left CVA tenderness, guarding or rebound. Negative signs include Montanez's sign, Rovsing's sign, McBurney's sign, psoas sign and obturator sign.      Hernia: No hernia is present.   Skin:     General: Skin is warm and dry.   Neurological:      General: No focal deficit present.      Mental Status: She is alert.                  Medical Decision Making:    Comorbidities that affect care:    None    External Notes reviewed:    Previous Admission Note: Admission note from 3- where patient was admitted to L&D      The following orders were placed and all results were independently analyzed by me:  Orders Placed This Encounter   Procedures    CT Abdomen Pelvis With Contrast    [place] Draw    Comprehensive Metabolic Panel    Lipase    Urinalysis With Microscopic If Indicated (No Culture) - Urine, Clean Catch    hCG, Quantitative, Pregnancy    CBC Auto Differential    Urinalysis, Microscopic Only - Urine, Clean Catch    NPO Diet NPO Type: Strict NPO    Undress & Gown    Verify NPO Status    Perform Chlorhexidine Skin Prep Night Before and Morning of Procedure    Place Sequential Compression Device    Maintain Sequential Compression Device    Remove Scopolamine Patch 24 Hours After Application    Continuous Pulse  Oximetry    Vital Signs Every 5 Minutes for 15 Minutes, Every 15 Minutes Thereafter.    Apply Warming [place]    Suction    Notify Anesthesia of Any Acute Changes in Patient Condition    Notify Anesthesia for Unrelieved Pain    Once Discharge Criteria to Floor Met, Follow Surgeon Orders    Discharge Patient From PACU When Discharge Criteria Met    Discharge to Care of  When Patient Meets Criteria    Give Patient Post-Op Discharge Instructions Per Surgeon    Maintain IV Access Until Discharge    Patient Must Tolerate Clear Liquids Prior to Discharge    Activity As Tolerated    Vital Signs    Activity as Tolerated    Notify Physician or Go To The ED For the Following Conditions    Remove Dressing in 48 Hours    Discharge Follow-up with Specified Provider: Dr. Miles; 2 Weeks    IP General Consult (Use specialty-specific consult if known)    Oxygen Therapy- Nasal Cannula; Titrate 1-6 LPM Per SpO2; 90 - 95%    POC Glucose STAT    Insert Peripheral IV    Discharge patient    CBC & Differential    Green Top (Gel)    Lavender Top    Gold Top - SST    Light Blue Top    Send To OR       Medications Given in the Emergency Department:  Medications   sodium chloride 0.9 % flush 10 mL ( Intravenous MAR Hold 3/14/25 1204)   lactated ringers infusion ( Intravenous Restarted 3/14/25 1329)   scopolamine patch 1 mg/72 hr (1 patch Transdermal Medication Applied 3/14/25 1231)   HYDROmorphone (DILAUDID) injection 0.25 mg (has no administration in time range)   ondansetron (ZOFRAN) injection 4 mg (has no administration in time range)   promethazine (PHENERGAN) suppository 25 mg (has no administration in time range)     Or   promethazine (PHENERGAN) tablet 25 mg (has no administration in time range)   ibuprofen (ADVIL,MOTRIN) tablet 600 mg (has no administration in time range)   HYDROmorphone (DILAUDID) injection 0.5 mg (has no administration in time range)   ondansetron ODT (ZOFRAN-ODT) disintegrating tablet 4 mg (has no  administration in time range)   HYDROcodone-acetaminophen (NORCO) 5-325 MG per tablet 1 tablet (1 tablet Oral Given 3/14/25 1422)   ondansetron (ZOFRAN) injection 4 mg (4 mg Intravenous Given 3/14/25 0902)   morphine injection 4 mg (4 mg Intravenous Given 3/14/25 0904)   iopamidol (ISOVUE-370) 76 % injection 100 mL (100 mL Intravenous Given 3/14/25 0844)   cefOXItin (MEFOXIN) 2 g in sodium chloride 0.9 % 100 mL IVPB-VTB (2 g Intravenous New Bag 3/14/25 1245)   acetaminophen (TYLENOL) tablet 1,000 mg (1,000 mg Oral Given 3/14/25 1231)   Midazolam HCl (PF) (VERSED) injection 2 mg (2 mg Intravenous Given 3/14/25 1231)        ED Course:    ED Course as of 03/14/25 1607   Fri Mar 14, 2025   0913 Discussed with patient with Dr. Miles.  He states that he will evaluate patient once he has done in the office. [MD]      ED Course User Index  [MD] Maxi Vega PA-C       Labs:    Lab Results (last 24 hours)       Procedure Component Value Units Date/Time    CBC & Differential [[id number]]  (Abnormal) Collected: 03/14/25 0209    Specimen: Blood from Arm, Right Updated: 03/14/25 0214    Narrative:      The following orders were created for panel order CBC & Differential.  Procedure                               Abnormality         Status                     ---------                               -----------         ------                     CBC Auto Differential[[id number]]        Abnormal            Final result                 Please view results for these tests on the individual orders.    Comprehensive Metabolic Panel [[id number]]  (Abnormal) Collected: 03/14/25 0209    Specimen: Blood from Arm, Right Updated: 03/14/25 0233     Glucose 86 mg/dL      BUN 16 mg/dL      Creatinine 0.68 mg/dL      Sodium 139 mmol/L      Potassium 4.1 mmol/L      Chloride 104 mmol/L      CO2 22.5 mmol/L      Calcium 9.6 mg/dL      Total Protein 6.3 g/dL      Albumin 3.3 g/dL      ALT (SGPT) 10 U/L      AST (SGOT) 15 U/L      Alkaline  Phosphatase 146 U/L      Total Bilirubin <0.2 mg/dL      Globulin 3.0 gm/dL      A/G Ratio 1.1 g/dL      BUN/Creatinine Ratio 23.5     Anion Gap 12.5 mmol/L      eGFR 116.6 mL/min/1.73     Narrative:      GFR Categories in Chronic Kidney Disease (CKD)      GFR Category          GFR (mL/min/1.73)    Interpretation  G1                     90 or greater         Normal or high (1)  G2                      60-89                Mild decrease (1)  G3a                   45-59                Mild to moderate decrease  G3b                   30-44                Moderate to severe decrease  G4                    15-29                Severe decrease  G5                    14 or less           Kidney failure          (1)In the absence of evidence of kidney disease, neither GFR category G1 or G2 fulfill the criteria for CKD.    eGFR calculation 2021 CKD-EPI creatinine equation, which does not include race as a factor    Lipase [[id number]]  (Normal) Collected: 03/14/25 0209    Specimen: Blood from Arm, Right Updated: 03/14/25 0233     Lipase 30 U/L     hCG, Quantitative, Pregnancy [[id number]] Collected: 03/14/25 0209    Specimen: Blood from Arm, Right Updated: 03/14/25 0239     HCG Quantitative 5,292.00 mIU/mL     Narrative:      HCG Ranges by Gestational Age    Females - non-pregnant premenopausal   </= 1mIU/mL HCG  Females - postmenopausal               </= 7mIU/mL HCG    3 Weeks       5.4   -      72 mIU/mL  4 Weeks      10.2   -     708 mIU/mL  5 Weeks       217   -   8,245 mIU/mL  6 Weeks       152   -  32,177 mIU/mL  7 Weeks     4,059   - 153,767 mIU/mL  8 Weeks    31,366   - 149,094 mIU/mL  9 Weeks    59,109   - 135,901 mIU/mL  10 Weeks   44,186   - 170,409 mIU/mL  12 Weeks   27,107   - 201,615 mIU/mL  14 Weeks   24,302   -  93,646 mIU/mL  15 Weeks   12,540   -  69,747 mIU/mL  16 Weeks    8,904   -  55,332 mIU/mL  17 Weeks    8,240   -  51,793 mIU/mL  18 Weeks    9,649   -  55,271 mIU/mL      CBC Auto Differential  [[id number]]  (Abnormal) Collected: 03/14/25 0209    Specimen: Blood from Arm, Right Updated: 03/14/25 0214     WBC 9.02 10*3/mm3      RBC 4.08 10*6/mm3      Hemoglobin 11.3 g/dL      Hematocrit 34.4 %      MCV 84.3 fL      MCH 27.7 pg      MCHC 32.8 g/dL      RDW 12.9 %      RDW-SD 39.0 fl      MPV 8.8 fL      Platelets 217 10*3/mm3      Neutrophil % 82.8 %      Lymphocyte % 9.6 %      Monocyte % 5.8 %      Eosinophil % 0.8 %      Basophil % 0.3 %      Immature Grans % 0.7 %      Neutrophils, Absolute 7.47 10*3/mm3      Lymphocytes, Absolute 0.87 10*3/mm3      Monocytes, Absolute 0.52 10*3/mm3      Eosinophils, Absolute 0.07 10*3/mm3      Basophils, Absolute 0.03 10*3/mm3      Immature Grans, Absolute 0.06 10*3/mm3      nRBC 0.0 /100 WBC     Urinalysis With Microscopic If Indicated (No Culture) - [[id number]]  (Abnormal) Collected: 03/14/25 0216    Specimen: Urine Updated: 03/14/25 0231     Color, UA Yellow     Appearance, UA Clear     pH, UA 7.0     Specific Gravity, UA 1.009     Glucose, UA Negative     Ketones, UA Negative     Bilirubin, UA Negative     Blood, UA Moderate (2+)     Protein, UA Negative     Leuk Esterase, UA Trace     Nitrite, UA Negative     Urobilinogen, UA 0.2 E.U./dL    Urinalysis, Microscopic Only - Urine, Clean Catch [[id number]]  (Abnormal) Collected: 03/14/25 0216    Specimen: Urine Updated: 03/14/25 0231     RBC, UA 11-20 /HPF      WBC, UA 0-2 /HPF      Bacteria, UA None Seen /HPF      Squamous Epithelial Cells, UA 0-2 /HPF      Hyaline Casts, UA None Seen /LPF      Methodology Automated Microscopy             Imaging:    CT Abdomen Pelvis With Contrast  Result Date: 3/14/2025  CT ABDOMEN PELVIS W CONTRAST Date of Exam: 3/14/2025 8:42 AM EDT Indication: Right abdominal pain x 2 days. Comparison: None available. Technique: Axial CT images were obtained of the abdomen and pelvis after the uneventful intravenous administration of iodinated contrast. Reconstructed coronal and sagittal images  were also obtained. Automated exposure control and iterative construction methods were used. FINDINGS: Abdomen/Pelvis: Lower Chest: Limited imaging the lung bases is grossly clear. No free air noted below the diaphragm. Organs: Punctate nonobstructing calculi are noted within the kidneys bilaterally. Indeterminate low-attenuation lesion measuring less than 5 mm right kidney. c indeterminate low-attenuation lesion right kidney too small to characterize measuring approximate 10 mm There is symmetric enhancement of the kidneys. No obstructing calculus identified within the ureters or collecting system. There is mild fullness of the right renal pelvis and proximal ureter which appears to be secondary to a recent postpartum uterus. Changes on the left are noted to a lesser degree The liver, gallbladder, pancreas and adrenal glands are grossly unremarkable in appearance. Spleen is borderline enlarged and otherwise unremarkable in appearance. GI/Bowel: There is a small hiatal hernia. The stomach is otherwise grossly unremarkable in appearance. Small bowel is grossly unremarkable in appearance. Ileocecal valve is unremarkable. Small bowel is displaced by postpartum uterus. Stranding and inflammatory changes in the right lower quadrant are associated with an abnormal appearance of the appendix which is distended and filled with fluid distal to a proximal appendicolith. There is no evidence of perforation or abscess formation at this time. The colon demonstrates a moderate stool burden with no definite acute abnormality otherwise noted. Pelvis: The uterus is enlarged and heterogeneous in its appearance compatible with recent postpartum state. Slightly complex appearance of the endometrium noted. The ovaries are grossly unremarkable in appearance. Mild engorgement of the pelvic vascularity noted. The urinary bladder is grossly unremarkable in appearance. No acute osseous abnormality. Peritoneum/Retroperitoneum: The aorta is  normal in caliber. Small lymph nodes within the retroperitoneum are likely reactive. Bones/Soft Tissues: No acute osseous abnormality.     1.Findings compatible with acute appendicitis. No evidence of perforation or abscess formation at this time. 2.Postpartum appearance of the uterus. 3.Nonobstructing renal calculi. 4.Indeterminate low-attenuation lesions within the right kidney. Recommend follow-up with nonemergent ultrasound. 5.Borderline splenomegaly. Please correlate clinically 6.Other findings as above. Findings discussed with ordering provider at the time of interpretation. Electronically Signed: Kareem Sanders MD  3/14/2025 9:02 AM EDT  Workstation ID: [id number]        Differential Diagnosis and Discussion:    Abdominal Pain: Based on the patient's signs and symptoms, I considered abdominal aortic aneurysm, small bowel obstruction, pancreatitis, acute cholecystitis, acute appendecitis, peptic ulcer disease, gastritis, colitis, endocrine disorders, irritable bowel syndrome and other differential diagnosis an etiology of the patient's abdominal pain.    PROCEDURES:    Labs were collected in the emergency department and all labs were reviewed and interpreted by me.  CT scan was performed in the emergency department and the CT scan radiology impression was interpreted by me.    No orders to display       Procedures    MDM  Number of Diagnoses or Management Options  Acute appendicitis, unspecified acute appendicitis type  Diagnosis management comments: Patient presented to the emergency department for evaluation of abdominal pain.  CBC notes normal white blood cell count with a hemoglobin 11.3, hematocrit 34.4.  Patient did have recent vaginal delivery.  CMP notes albumin 3.3 alk phos 146.  Lipase normal.  hCG still positive but again patient had recent vaginal delivery on 3- and this is dropping appropriately.  Urinalysis noted to be positive for microscopic hematuria.  CT abdomen pelvis noted to have  acute appendicitis without complication and other chronic findings.  Patient was discussed with Dr. Miles who agrees to see patient and take patient to surgery.       Amount and/or Complexity of Data Reviewed  Clinical lab tests: reviewed and ordered  Tests in the radiology section of CPT®: reviewed and ordered    Risk of Complications, Morbidity, and/or Mortality  Presenting problems: moderate  Diagnostic procedures: moderate  Management options: moderate    Patient Progress  Patient progress: stable       Patient Care Considerations:    SEPSIS was considered but is NOT present in the emergency department as SIRS criteria is not present.      Consultants/Shared Management Plan:    Consultant: I have discussed the case with Dr. Miles who states he will take patient to surgery    Social Determinants of Health:    Patient is independent, reliable, and has access to care.       Disposition and Care Coordination:    Send to OR/Endoscopy      Final diagnoses:   Acute appendicitis, unspecified acute appendicitis type        ED Disposition       ED Disposition   Send to OR    Condition   --    Comment   --               This medical record created using voice recognition software.             Maxi Vega PA-C  03/14/25 5206

## 2025-03-14 NOTE — ANESTHESIA POSTPROCEDURE EVALUATION
Patient: Patsy Motley    Procedure Summary       Date: 03/14/25 Room / Location: [facility] OR 09 / [facility] MAIN OR    Anesthesia Start: 1235 Anesthesia Stop: 1344    Procedure: Laparoscopic possible open appendectomy-remove appendix with small incisions camera and long instruments. (Abdomen) Diagnosis:       Acute appendicitis, unspecified acute appendicitis type      (Acute appendicitis, unspecified acute appendicitis type [K35.80])    Surgeons: Bernard Miles MD Provider: Billy Louise MD    Anesthesia Type: general ASA Status: 2 - Emergent            Anesthesia Type: general    Vitals  Vitals Value Taken Time   /66 03/14/25 14:32   Temp 36.3 °C (97.3 °F) 03/14/25 14:25   Pulse 80 03/14/25 14:32   Resp 20 03/14/25 14:30   SpO2 94 % 03/14/25 14:32   Vitals shown include unfiled device data.        Post Anesthesia Care and Evaluation    Patient location during evaluation: bedside  Patient participation: complete - patient participated  Level of consciousness: awake  Pain management: adequate    Airway patency: patent  PONV Status: none  Cardiovascular status: acceptable and stable  Respiratory status: acceptable  Hydration status: acceptable

## 2025-03-14 NOTE — H&P
[facility]   HISTORY AND PHYSICAL    Patient Name: Patsy Motley  : 1990  MRN: [id number]  Primary Care Physician:  Christin, No Known  Date of admission: 3/14/2025    Subjective   Subjective     Chief Complaint: Right lower quadrant pain    HPI:    Patsy Motley is a 34 y.o. female who presents with a 1 day history of progressive right lower quadrant abdominal pain.  She came to our emergency room this morning and had a CT scan that showed evidence of the acute appendicitis.  She recently gave birth    Review of Systems   Respiratory:  Negative for shortness of breath.    Cardiovascular:  Negative for chest pain.   Gastrointestinal:  Positive for abdominal pain.       Personal History     Past Medical History:   Diagnosis Date    Abnormal Pap smear of cervix     hx Gestational diabetes     Pre gestational diabetes mellitus        Past Surgical History:   Procedure Laterality Date    NO PAST SURGERIES         Family History: family history includes Heart disease in her father and mother. Otherwise pertinent FHx was reviewed and not pertinent to current issue.    Social History:  reports that she has quit smoking. Her smoking use included cigarettes. She started smoking about 4 years ago. She does not have any smokeless tobacco history on file. She reports that she does not currently use alcohol. She reports that she does not currently use drugs after having used the following drugs: Marijuana.    Home Medications:  Alcohol Swabs, DHA Omega 3, FreeStyle Lite, Lancets, acetaminophen, ferrous sulfate, glucose blood, glucose monitor, ibuprofen, and prenatal vitamin 28-0.8    Allergies:  No Known Allergies    Objective    Objective     Vitals:   Temp:  [98.5 °F (36.9 °C)-99 °F (37.2 °C)] 98.8 °F (37.1 °C)  Heart Rate:  [81-93] 92  Resp:  [15-22] 15  BP: (129-144)/(81-86) 129/82    Physical Exam  HENT:      Head: Normocephalic.   Cardiovascular:      Rate and Rhythm: Normal rate.   Pulmonary:       Effort: Pulmonary effort is normal.   Abdominal:      Tenderness: There is abdominal tenderness.   Musculoskeletal:         General: Normal range of motion.      Cervical back: Normal range of motion.   Skin:     General: Skin is warm.   Neurological:      General: No focal deficit present.      Mental Status: She is alert.   Psychiatric:         Mood and Affect: Mood normal.         Result Review    Result Review:  I have personally reviewed the results from the time of this admission to 3/14/2025 11:00 EDT and agree with these findings:  [x]  Laboratory  []  Microbiology  [x]  Radiology  []  EKG/Telemetry   []  Cardiology/Vascular   []  Pathology  []  Old records  []  Other:   Most notable findings include: White blood cell count of 9000.    Assessment & Plan   Assessment / Plan     Brief Patient Summary:  Patsy Motley is a 34 y.o. female who presents with acute appendicitis.    Active Hospital Problems:  Active Hospital Problems    Diagnosis     Acute appendicitis with localized peritonitis, without perforation, abscess, or gangrene        Plan:   We will proceed with a laparoscopic appendectomy.  I have described the procedure to her as well as the risk and benefits and she is agreeable to proceeding.    VTE Prophylaxis:  No VTE prophylaxis order currently exists.        CODE STATUS:         Admission Status:  I believe this patient meets outpatient status.    Electronically signed by Bernard Miles MD, 03/14/25, 10:54 AM EDT.

## 2025-03-14 NOTE — ANESTHESIA PREPROCEDURE EVALUATION
Anesthesia Evaluation     Patient summary reviewed and Nursing notes reviewed   no history of anesthetic complications:   NPO Solid Status: > 8 hours  NPO Liquid Status: > 2 hours           Airway   Mallampati: II  TM distance: >3 FB  Neck ROM: full  No difficulty expected  Dental      Pulmonary - negative pulmonary ROS and normal exam    breath sounds clear to auscultation  Cardiovascular - negative cardio ROS and normal exam  Exercise tolerance: good (4-7 METS)    Rhythm: regular  Rate: normal        Neuro/Psych- negative ROS  GI/Hepatic/Renal/Endo - negative ROS     Musculoskeletal     Abdominal    Substance History      OB/GYN      Comment: Uncomplicated pregnancy, and vaginal delivery 3/12/2025.       Other        ROS/Med Hx Other: PAT Nursing Notes unavailable.               Anesthesia Plan    ASA 2 - emergent     general     (Patient understands anesthesia not responsible for dental damage.)  intravenous induction     Anesthetic plan, risks, benefits, and alternatives have been provided, discussed and informed consent has been obtained with: patient.    Plan discussed with CRNA.    CODE STATUS:

## 2025-03-18 ENCOUNTER — TELEPHONE (OUTPATIENT)
Dept: LACTATION | Facility: HOSPITAL | Age: 35
End: 2025-03-18
Payer: MEDICAID

## 2025-03-18 LAB
CYTO UR: NORMAL
LAB AP CASE REPORT: NORMAL
LAB AP CLINICAL INFORMATION: NORMAL
PATH REPORT.FINAL DX SPEC: NORMAL
PATH REPORT.GROSS SPEC: NORMAL

## 2025-03-18 NOTE — TELEPHONE ENCOUNTER
Attempted to make follow up call with this patient on breastfeeding progress, but was unable to leave a message. Voicemail box full.

## 2025-03-28 ENCOUNTER — OFFICE VISIT (OUTPATIENT)
Dept: SURGERY | Facility: CLINIC | Age: 35
End: 2025-03-28
Payer: MEDICAID

## 2025-03-28 VITALS — BODY MASS INDEX: 26.95 KG/M2 | RESPIRATION RATE: 16 BRPM | HEIGHT: 61 IN

## 2025-03-28 DIAGNOSIS — K35.30 ACUTE APPENDICITIS WITH LOCALIZED PERITONITIS, WITHOUT PERFORATION, ABSCESS, OR GANGRENE: Primary | ICD-10-CM

## 2025-03-28 PROCEDURE — 99024 POSTOP FOLLOW-UP VISIT: CPT | Performed by: SURGERY

## 2025-03-28 NOTE — PROGRESS NOTES
Chief Complaint  Follow-up (Lap Appy )    Subjective          Patsy Motley presents to [facility]  History of Present Illness    Patsy Motley is a 34 y.o. female  who presents today for a postoperative visit.     Patient is here for a follow-up after a recent laparoscopic appendectomy done for acute appendicitis.  She is also recently status post childbirth.  She is doing well at this point.  She is not having any pain.  She is eating normally.  Her baby is doing well.    Past History:  Medical History: has a past medical history of Abnormal Pap smear of cervix, hx Gestational diabetes, and Pre gestational diabetes mellitus.   Surgical History: has a past surgical history that includes No past surgeries and Appendectomy (N/A, 3/14/2025).   Family History: family history includes Heart disease in her father and mother.   Social History: reports that she quit smoking about 4 years ago. Her smoking use included cigarettes. She started smoking about 17 years ago. She has a 13 pack-year smoking history. She has never used smokeless tobacco. She reports that she does not currently use alcohol. She reports that she does not currently use drugs after having used the following drugs: Marijuana.  Allergies: Patient has no known allergies.       Current Outpatient Medications:     acetaminophen (Tylenol) 325 MG tablet, Take 2 tablets by mouth Every 6 (Six) Hours As Needed for Mild Pain (alternate with motrin)., Disp: 30 tablet, Rfl: 1    ferrous sulfate 325 (65 FE) MG tablet, Take 1 tablet by mouth Every Other Day., Disp: 15 tablet, Rfl: 2    ibuprofen (ADVIL,MOTRIN) 600 MG tablet, Take 1 tablet by mouth Every 6 (Six) Hours As Needed for Mild Pain., Disp: 30 tablet, Rfl: 0    Prenatal Vit-Fe Fumarate-FA (prenatal vitamin 28-0.8) 28-0.8 MG tablet tablet, TAKE 1 TABLET BY MOUTH EVERY DAY, Disp: 90 tablet, Rfl: 4    HYDROcodone-acetaminophen (NORCO) 5-325 MG per tablet, Take 1 tablet by  "mouth Every 6 (Six) Hours As Needed for Moderate Pain (Pain). (Patient not taking: Reported on 3/28/2025), Disp: 10 tablet, Rfl: 0       Physical Exam  She looks good today.  Her incisions look good.  Objective     Vital Signs:   Resp 16   Ht 154.9 cm (61\")   BMI 26.95 kg/m²              Assessment and Plan    Diagnoses and all orders for this visit:    1. Acute appendicitis with localized peritonitis, without perforation, abscess, or gangrene (Primary)    I will see her back on an as-needed basis.  I have asked her to call me if she were to have any further questions or concerns.      "

## 2025-04-08 PROBLEM — Z34.90 ENCOUNTER FOR INDUCTION OF LABOR: Status: RESOLVED | Noted: 2025-03-11 | Resolved: 2025-04-08

## 2025-04-08 PROBLEM — O09.90 HIGH-RISK PREGNANCY: Status: RESOLVED | Noted: 2023-08-03 | Resolved: 2025-04-08

## 2025-04-28 NOTE — PROGRESS NOTES
"POSTPARTUM Follow Up Visit      Chief Complaint   Patient presents with    Postpartum Care     HPI:    Date of delivery: 3/11/25  Delivery type:            Perineum : Intact  Delivering Provider:   Dr. Marisa Delacruz        Feeding: Breast  Pain:  no  Vaginal Bleeding:  no  Plans for BC:  wants to discuss    Depressed/Anxious:  no  EPDS score: 0  #10: 0    Weight at last OB OV:  151lb  Last pap date and result: Pap negative, HPV negative  IGP,CtNgTv,Apt HPV,rfx 16 / 18,45 (2024 11:32)    Discharge Summary by Marisa Delacruz DO (2025 18:15)  Recent appendicitis Dr. Miles  Preexisting DM BS at home all wnl by report.  Off metformin.    PHYSICAL EXAM:  /79   Pulse 81   Ht 154.9 cm (61\")   Wt 59.4 kg (131 lb)   Breastfeeding Yes   BMI 24.75 kg/m²  Not found.  Chaperone present during breast and/or pelvic exam if performed.  General- NAD, alert and oriented, appropriate  Psych- Normal mood, good memory, good eye contact      Abdomen- Soft, non distended, non tender, no masses    External genitalia- Normal.    Urethra/Bladder/Vagina- Normal, no masses, non-tender  Prolapse : none noted    Cervix- Normal, no lesions, no discharge, no CMT  Uterus- Normal size, shape & consistency.  Non tender, mobile.  Adnexa- Normal, no mass, non-tender    Lymphatic- No palpable groin nodes  Extremities- No edema    ASSESSMENT AND PLAN:  Diagnoses and all orders for this visit:    1. Postpartum follow-up (Primary)    2. Birth control counseling  Comments:  Nexplanon  Orders:  -     hCG, Quantitative, Pregnancy; Future    3. History of diabetes mellitus  Comments:  Recommend continue to check blood sugars.  Recommend yearly hemoglobin A1c's.  To PCP if blood sugars elevated      Counseling:    All birth control options reviewed in detail.  R/B/A/SE/E of each wrt pts PMHx and prior BC use  May resume normal activities  Core strengthening exercises reviewed and recommended  Kegel exercises reviewed and recommended  Ok to " return to work/school once patient desires/maternity leave completed  Abstinence until IUD[facility]Nexplanon placed, [facility]        Follow Up:  Return for Nexplanon placement.          Marisa Delacruz DO  04/29/2025    [facility] [address] [facility]  [address] [place] [address] [address] [place] [address]  Dept: [phone]  Dept Fax: [phone]  Loc: [phone]  Loc Fax: [phone]

## 2025-04-29 ENCOUNTER — CLINICAL SUPPORT (OUTPATIENT)
Dept: OBSTETRICS AND GYNECOLOGY | Age: 35
End: 2025-04-29
Payer: MEDICAID

## 2025-04-29 ENCOUNTER — POSTPARTUM VISIT (OUTPATIENT)
Dept: OBSTETRICS AND GYNECOLOGY | Age: 35
End: 2025-04-29
Payer: MEDICAID

## 2025-04-29 VITALS
HEART RATE: 81 BPM | BODY MASS INDEX: 24.73 KG/M2 | DIASTOLIC BLOOD PRESSURE: 79 MMHG | SYSTOLIC BLOOD PRESSURE: 122 MMHG | WEIGHT: 131 LBS | HEIGHT: 61 IN

## 2025-04-29 DIAGNOSIS — Z86.39 HISTORY OF DIABETES MELLITUS: ICD-10-CM

## 2025-04-29 DIAGNOSIS — Z30.09 BIRTH CONTROL COUNSELING: ICD-10-CM

## 2025-04-29 LAB — HCG INTACT+B SERPL-ACNC: <0.5 MIU/ML

## 2025-04-29 PROCEDURE — 84702 CHORIONIC GONADOTROPIN TEST: CPT | Performed by: OBSTETRICS & GYNECOLOGY

## 2025-04-29 NOTE — PROGRESS NOTES
"Nexplanon Placement    CC: Pt presents for nexplanon placement  Chief Complaint   Patient presents with    Contraception     hCG, Quantitative, Pregnancy (2025 14:17)    Sex in last 2 weeks:  No  Pregnancy test:  [facility] neg  Consent signed: yes    The procedure has been explained in detail.  She understands insertion complications occur rarely and include, but are not limited to, failure (pregnancy), pain, scarring, bleeding, and infection.  It is common (>30%) to have irregular and unpredictable periods. Nexplanon will need to be removed in 3 years and that complications during removal can occur (<2%). Reports of migration to blood vessels of the chest have occurred.  Check it regularly and contact office if it cannot be felt or feels different. Her questions have been answered.      Subjective:  34 y.o. No LMP recorded.    No co    Objective:  /78   Pulse 74   Ht 154.9 cm (61\")   Wt 59.9 kg (132 lb)   Breastfeeding Yes   BMI 24.94 kg/m²     Nexplanon insertion  The patient was placed in supine position with the (non-dominant) Left arm flexed at the elbow and externally rotated.  The insertion site was identified approximately 8-10cm proximal to the medial epicondyle of the humerus and 3-5cm inferior to the sulcus (between the biceps and triceps muscles).  The site was cleaned betadine (Hibiclens used if allergy) and alcohol.  Sterile technique was maintained.  The area was anesthetized with local anesthetic.  Visual confirmation was made of the Nexplanon kinsey in the needle tip.  The Nexplanon needle was inserted gently in the subdermal connective tissue to its full length.  Placement was confirmed by visual absence of the kinsey within the needle.  Presence of the Nexplanon was verified by palpation of a 4cm long kinsey by the provider and the patient.  A steri-strip and sterile pressure bandage was placed.    Patient tolerated the procedure well.    Assessment and Plan:  Diagnoses and all orders " for this visit:    1. Nexplanon insertion (Primary)  -     Etonogestrel (NEXPLANON) 68 MG subdermal implant        Counseling:  Pt was counseled on the risks, benefits, and side-effects of NEXPLANON and provided educational material if desired along with the user card. It provides contraception for 3 years and does require removal. Failure is <1%. It does not protect against STIs, to include HIV/AIDS, and condoms are recommended.       I recommend backup non-hormonal birth-control (condoms and spermacide) for 7 days.       PRECAUTIONS--Remove the bandage in 24 hours and the steri-strips in 3-5 days.  If prolonged or heavy bleeding, check a pregnancy test and call our office.   Removal can sometimes be difficult and/or require surgery. If it can not be felt, pt is to call our office and use condoms until confirmation of the placement is complete.     Follow Up:  Return for As needed.        Marisa Delacruz DO  04/30/2025    [facility] [address] [facility]  [address] [place] [address] [address] [place] [address]  Dept: [phone]  Dept Fax: [phone]  Loc: [phone]  Loc Fax: [phone]

## 2025-04-29 NOTE — PROGRESS NOTES
Venipuncture Blood Specimen Collection  Venipuncture performed in left arm by Nichelle Mercado with good hemostasis. Patient tolerated the procedure well without complications.   04/29/25   Nichelle Mercado

## 2025-04-30 ENCOUNTER — PROCEDURE VISIT (OUTPATIENT)
Dept: OBSTETRICS AND GYNECOLOGY | Age: 35
End: 2025-04-30
Payer: MEDICAID

## 2025-04-30 VITALS
HEIGHT: 61 IN | BODY MASS INDEX: 24.92 KG/M2 | SYSTOLIC BLOOD PRESSURE: 114 MMHG | DIASTOLIC BLOOD PRESSURE: 78 MMHG | HEART RATE: 74 BPM | WEIGHT: 132 LBS

## 2025-04-30 DIAGNOSIS — Z30.017 NEXPLANON INSERTION: Primary | ICD-10-CM

## (undated) DEVICE — TISSUE RETRIEVAL SYSTEM: Brand: INZII RETRIEVAL SYSTEM

## (undated) DEVICE — SOL IRR NACL 0.9PCT 1000ML

## (undated) DEVICE — SYR LL TP 10ML STRL

## (undated) DEVICE — STERILE POLYISOPRENE POWDER-FREE SURGICAL GLOVES WITH EMOLLIENT COATING: Brand: PROTEXIS

## (undated) DEVICE — STRIP CLS WND SUTURESTRIP/PLS 0.5X4IN TP1103

## (undated) DEVICE — ANTIBACTERIAL UNDYED BRAIDED (POLYGLACTIN 910), SYNTHETIC ABSORBABLE SUTURE: Brand: COATED VICRYL

## (undated) DEVICE — SUT ETHILON 3/0 30IN BLK

## (undated) DEVICE — LAPAROSCOPIC TROCAR SLEEVE/SINGLE USE: Brand: KII® OPTICAL ACCESS SYSTEM

## (undated) DEVICE — ENDOPATH XCEL WITH OPTIVIEW TECHNOLOGY UNIVERSAL TROCAR STABILITY SLEEVES: Brand: ENDOPATH XCEL OPTIVIEW

## (undated) DEVICE — GOWN,SIRUS,POLYRNF,BRTHSLV,2XL,18/CS: Brand: MEDLINE

## (undated) DEVICE — SOL NACL 0.9PCT 1000ML

## (undated) DEVICE — GLV SURG SENSICARE PI ORTHO SZ8 LF STRL

## (undated) DEVICE — SYS CLOSE PORTII CARTR/THOMASN XL

## (undated) DEVICE — SOL IRR NACL 0.9PCT BO 1000ML

## (undated) DEVICE — ENDOPATH ETS-FLEX45 ARTICULATING ENDOSCOPIC LINEAR CUTTER, NO RELOAD: Brand: ENDOPATH

## (undated) DEVICE — ENDOPATH XCEL WITH OPTIVIEW TECHNOLOGY BLADELESS TROCARS WITH STABILITY SLEEVES: Brand: ENDOPATH XCEL OPTIVIEW

## (undated) DEVICE — INTENDED FOR TISSUE SEPARATION, AND OTHER PROCEDURES THAT REQUIRE A SHARP SURGICAL BLADE TO PUNCTURE OR CUT.: Brand: BARD-PARKER ® CARBON RIB-BACK BLADES

## (undated) DEVICE — ENDOPATH PNEUMONEEDLE INSUFFLATION NEEDLES WITH LUER LOCK CONNECTORS 120MM: Brand: ENDOPATH

## (undated) DEVICE — 2, DISPOSABLE SUCTION/IRRIGATOR WITHOUT DISPOSABLE TIP: Brand: STRYKEFLOW

## (undated) DEVICE — SLV SCD KN/LEN ADJ EXPRSS BLENDED MD 1P/U

## (undated) DEVICE — LAPAROVUE VISIBILITY SYSTEM LAPAROSCOPIC SOLUTIONS: Brand: LAPAROVUE

## (undated) DEVICE — PENCL SMOKE/EVAC MEGADYNE TELESCP 10FT

## (undated) DEVICE — BLUNT TIP LAPAROSCOPIC SEALER/DIVIDER NANO-COATED: Brand: LIGASURE

## (undated) DEVICE — THE STERILE LIGHT HANDLE COVER IS USED WITH STERIS SURGICAL LIGHTING AND VISUALIZATION SYSTEMS.

## (undated) DEVICE — GENERAL LAPAROSCOPY-LF: Brand: MEDLINE INDUSTRIES, INC.

## (undated) DEVICE — SUT MERSILENE POLYSTR CT1 BR 0 75CM GRN

## (undated) DEVICE — NON-WOVEN ADHESIVE WOUND DRESSING: Brand: PRIMAPORE ADHESIVE WOUND DRSG 7.2*5CM